# Patient Record
Sex: FEMALE | Race: WHITE | NOT HISPANIC OR LATINO | URBAN - METROPOLITAN AREA
[De-identification: names, ages, dates, MRNs, and addresses within clinical notes are randomized per-mention and may not be internally consistent; named-entity substitution may affect disease eponyms.]

---

## 2018-02-03 ENCOUNTER — EMERGENCY (EMERGENCY)
Facility: HOSPITAL | Age: 83
LOS: 1 days | End: 2018-02-03
Payer: MEDICARE

## 2018-02-03 PROCEDURE — 71046 X-RAY EXAM CHEST 2 VIEWS: CPT | Mod: 26

## 2018-02-03 PROCEDURE — 99284 EMERGENCY DEPT VISIT MOD MDM: CPT

## 2018-02-03 PROCEDURE — 74018 RADEX ABDOMEN 1 VIEW: CPT | Mod: 26

## 2019-02-08 ENCOUNTER — EMERGENCY (EMERGENCY)
Facility: HOSPITAL | Age: 84
LOS: 1 days | End: 2019-02-08
Admitting: EMERGENCY MEDICINE
Payer: MEDICARE

## 2019-02-08 PROCEDURE — 72131 CT LUMBAR SPINE W/O DYE: CPT | Mod: 26

## 2019-02-08 PROCEDURE — 99284 EMERGENCY DEPT VISIT MOD MDM: CPT

## 2019-02-08 PROCEDURE — 73030 X-RAY EXAM OF SHOULDER: CPT | Mod: 26,LT

## 2019-02-08 PROCEDURE — 73562 X-RAY EXAM OF KNEE 3: CPT | Mod: 26,LT

## 2019-02-08 PROCEDURE — 70450 CT HEAD/BRAIN W/O DYE: CPT | Mod: 26

## 2019-03-11 ENCOUNTER — OUTPATIENT (OUTPATIENT)
Dept: OUTPATIENT SERVICES | Facility: HOSPITAL | Age: 84
LOS: 1 days | End: 2019-03-11

## 2019-03-30 ENCOUNTER — OUTPATIENT (OUTPATIENT)
Dept: OUTPATIENT SERVICES | Facility: HOSPITAL | Age: 84
LOS: 1 days | End: 2019-03-30

## 2019-03-30 ENCOUNTER — INPATIENT (INPATIENT)
Facility: HOSPITAL | Age: 84
LOS: 1 days | Discharge: HOME | End: 2019-04-01
Attending: FAMILY MEDICINE
Payer: MEDICARE

## 2019-03-30 PROCEDURE — 70450 CT HEAD/BRAIN W/O DYE: CPT | Mod: 26

## 2019-03-30 PROCEDURE — 93880 EXTRACRANIAL BILAT STUDY: CPT | Mod: 26

## 2019-03-30 PROCEDURE — 71045 X-RAY EXAM CHEST 1 VIEW: CPT | Mod: 26

## 2019-03-30 PROCEDURE — 99285 EMERGENCY DEPT VISIT HI MDM: CPT

## 2019-03-30 PROCEDURE — 93010 ELECTROCARDIOGRAM REPORT: CPT

## 2019-03-31 ENCOUNTER — OUTPATIENT (OUTPATIENT)
Dept: OUTPATIENT SERVICES | Facility: HOSPITAL | Age: 84
LOS: 1 days | End: 2019-03-31

## 2019-04-01 ENCOUNTER — OUTPATIENT (OUTPATIENT)
Dept: OUTPATIENT SERVICES | Facility: HOSPITAL | Age: 84
LOS: 1 days | End: 2019-04-01

## 2019-04-08 ENCOUNTER — OUTPATIENT (OUTPATIENT)
Dept: OUTPATIENT SERVICES | Facility: HOSPITAL | Age: 84
LOS: 1 days | End: 2019-04-08

## 2019-07-17 ENCOUNTER — OUTPATIENT (OUTPATIENT)
Dept: OUTPATIENT SERVICES | Facility: HOSPITAL | Age: 84
LOS: 1 days | End: 2019-07-17
Payer: MEDICARE

## 2019-07-17 PROCEDURE — 70553 MRI BRAIN STEM W/O & W/DYE: CPT | Mod: 26

## 2019-08-26 ENCOUNTER — APPOINTMENT (OUTPATIENT)
Dept: MAMMOGRAPHY | Facility: CLINIC | Age: 84
End: 2019-08-26
Payer: MEDICARE

## 2019-08-26 PROCEDURE — 77067 SCR MAMMO BI INCL CAD: CPT

## 2019-08-26 PROCEDURE — 77063 BREAST TOMOSYNTHESIS BI: CPT

## 2020-02-29 ENCOUNTER — EMERGENCY (EMERGENCY)
Facility: HOSPITAL | Age: 85
LOS: 1 days | End: 2020-02-29
Admitting: EMERGENCY MEDICINE
Payer: MEDICARE

## 2020-02-29 PROCEDURE — 73630 X-RAY EXAM OF FOOT: CPT | Mod: 26,RT

## 2020-02-29 PROCEDURE — 99283 EMERGENCY DEPT VISIT LOW MDM: CPT

## 2020-03-01 PROCEDURE — 99285 EMERGENCY DEPT VISIT HI MDM: CPT

## 2020-03-02 ENCOUNTER — OUTPATIENT (OUTPATIENT)
Dept: OUTPATIENT SERVICES | Facility: HOSPITAL | Age: 85
LOS: 1 days | End: 2020-03-02

## 2020-03-02 ENCOUNTER — INPATIENT (INPATIENT)
Facility: HOSPITAL | Age: 85
LOS: 0 days | Discharge: HOME HEALTH | End: 2020-03-02
Admitting: STUDENT IN AN ORGANIZED HEALTH CARE EDUCATION/TRAINING PROGRAM
Payer: MEDICARE

## 2020-03-06 ENCOUNTER — INPATIENT (INPATIENT)
Facility: HOSPITAL | Age: 85
LOS: 3 days | Discharge: HOME | End: 2020-03-10
Payer: MEDICARE

## 2020-03-06 PROCEDURE — 99285 EMERGENCY DEPT VISIT HI MDM: CPT

## 2020-03-07 ENCOUNTER — OUTPATIENT (OUTPATIENT)
Dept: OUTPATIENT SERVICES | Facility: HOSPITAL | Age: 85
LOS: 1 days | End: 2020-03-07

## 2020-03-08 ENCOUNTER — OUTPATIENT (OUTPATIENT)
Dept: OUTPATIENT SERVICES | Facility: HOSPITAL | Age: 85
LOS: 1 days | End: 2020-03-08

## 2020-03-09 ENCOUNTER — OUTPATIENT (OUTPATIENT)
Dept: OUTPATIENT SERVICES | Facility: HOSPITAL | Age: 85
LOS: 1 days | End: 2020-03-09

## 2020-03-09 PROCEDURE — 73718 MRI LOWER EXTREMITY W/O DYE: CPT | Mod: 26,RT

## 2020-03-09 PROCEDURE — 93970 EXTREMITY STUDY: CPT | Mod: 26

## 2020-03-10 ENCOUNTER — OUTPATIENT (OUTPATIENT)
Dept: OUTPATIENT SERVICES | Facility: HOSPITAL | Age: 85
LOS: 1 days | End: 2020-03-10

## 2020-06-18 PROBLEM — Z00.00 ENCOUNTER FOR PREVENTIVE HEALTH EXAMINATION: Status: ACTIVE | Noted: 2020-06-18

## 2021-06-28 ENCOUNTER — APPOINTMENT (OUTPATIENT)
Dept: ULTRASOUND IMAGING | Facility: CLINIC | Age: 86
End: 2021-06-28

## 2021-06-28 ENCOUNTER — APPOINTMENT (OUTPATIENT)
Dept: RADIOLOGY | Facility: CLINIC | Age: 86
End: 2021-06-28
Payer: MEDICARE

## 2021-06-28 PROCEDURE — 77080 DXA BONE DENSITY AXIAL: CPT

## 2021-06-28 PROCEDURE — 76536 US EXAM OF HEAD AND NECK: CPT

## 2021-10-18 ENCOUNTER — NON-APPOINTMENT (OUTPATIENT)
Age: 86
End: 2021-10-18

## 2021-10-18 ENCOUNTER — APPOINTMENT (OUTPATIENT)
Dept: OPHTHALMOLOGY | Facility: CLINIC | Age: 86
End: 2021-10-18
Payer: MEDICARE

## 2021-10-18 PROCEDURE — 92134 CPTRZ OPH DX IMG PST SGM RTA: CPT

## 2021-10-18 PROCEDURE — 92014 COMPRE OPH EXAM EST PT 1/>: CPT

## 2022-04-14 ENCOUNTER — APPOINTMENT (OUTPATIENT)
Dept: OPHTHALMOLOGY | Facility: CLINIC | Age: 87
End: 2022-04-14

## 2022-05-02 ENCOUNTER — NON-APPOINTMENT (OUTPATIENT)
Age: 87
End: 2022-05-02

## 2022-05-02 ENCOUNTER — APPOINTMENT (OUTPATIENT)
Dept: OPHTHALMOLOGY | Facility: CLINIC | Age: 87
End: 2022-05-02
Payer: MEDICARE

## 2022-05-02 PROCEDURE — 92014 COMPRE OPH EXAM EST PT 1/>: CPT

## 2022-05-02 PROCEDURE — 92250 FUNDUS PHOTOGRAPHY W/I&R: CPT

## 2022-05-25 ENCOUNTER — TRANSCRIPTION ENCOUNTER (OUTPATIENT)
Age: 87
End: 2022-05-25

## 2022-05-25 ENCOUNTER — INPATIENT (INPATIENT)
Facility: HOSPITAL | Age: 87
LOS: 12 days | Discharge: HOME | DRG: 85 | End: 2022-06-07
Attending: HOSPITALIST | Admitting: NEUROLOGICAL SURGERY
Payer: MEDICARE

## 2022-05-25 ENCOUNTER — EMERGENCY (EMERGENCY)
Facility: HOSPITAL | Age: 87
LOS: 1 days | Discharge: HOME | End: 2022-05-25
Admitting: EMERGENCY MEDICINE
Payer: MEDICARE

## 2022-05-25 VITALS
SYSTOLIC BLOOD PRESSURE: 134 MMHG | DIASTOLIC BLOOD PRESSURE: 74 MMHG | HEART RATE: 87 BPM | OXYGEN SATURATION: 99 % | RESPIRATION RATE: 18 BRPM

## 2022-05-25 DIAGNOSIS — S01.01XA LACERATION WITHOUT FOREIGN BODY OF SCALP, INITIAL ENCOUNTER: ICD-10-CM

## 2022-05-25 DIAGNOSIS — Z90.49 ACQUIRED ABSENCE OF OTHER SPECIFIED PARTS OF DIGESTIVE TRACT: Chronic | ICD-10-CM

## 2022-05-25 DIAGNOSIS — I61.9 NONTRAUMATIC INTRACEREBRAL HEMORRHAGE, UNSPECIFIED: ICD-10-CM

## 2022-05-25 DIAGNOSIS — E11.9 TYPE 2 DIABETES MELLITUS WITHOUT COMPLICATIONS: ICD-10-CM
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
## 2022-05-25 DIAGNOSIS — S06.309A UNSPECIFIED FOCAL TRAUMATIC BRAIN INJURY WITH LOSS OF CONSCIOUSNESS OF UNSPECIFIED DURATION, INITIAL ENCOUNTER: ICD-10-CM
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
## 2022-05-25 DIAGNOSIS — Z04.3 ENCOUNTER FOR EXAMINATION AND OBSERVATION FOLLOWING OTHER ACCIDENT: ICD-10-CM
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
## 2022-05-25 LAB
ALBUMIN SERPL ELPH-MCNC: 3.9 G/DL — SIGNIFICANT CHANGE UP (ref 3.3–5.2)
ALLERGY+IMMUNOLOGY DIAG STUDY NOTE: SIGNIFICANT CHANGE UP
ALP SERPL-CCNC: 125 U/L — HIGH (ref 40–120)
ALT FLD-CCNC: 44 U/L — HIGH
AMPHET UR-MCNC: NEGATIVE — SIGNIFICANT CHANGE UP
ANION GAP SERPL CALC-SCNC: 12 MMOL/L — SIGNIFICANT CHANGE UP (ref 5–17)
APPEARANCE UR: CLEAR — SIGNIFICANT CHANGE UP
APTT BLD: 27.6 SEC — SIGNIFICANT CHANGE UP (ref 27.5–35.5)
AST SERPL-CCNC: 45 U/L — HIGH
BARBITURATES UR SCN-MCNC: NEGATIVE — SIGNIFICANT CHANGE UP
BASOPHILS # BLD AUTO: 0.04 K/UL — SIGNIFICANT CHANGE UP (ref 0–0.2)
BASOPHILS NFR BLD AUTO: 0.4 % — SIGNIFICANT CHANGE UP (ref 0–2)
BENZODIAZ UR-MCNC: NEGATIVE — SIGNIFICANT CHANGE UP
BILIRUB SERPL-MCNC: 0.4 MG/DL — SIGNIFICANT CHANGE UP (ref 0.4–2)
BILIRUB UR-MCNC: NEGATIVE — SIGNIFICANT CHANGE UP
BLD GP AB SCN SERPL QL: SIGNIFICANT CHANGE UP
BLD GP AB SCN SERPL QL: SIGNIFICANT CHANGE UP
BUN SERPL-MCNC: 18.8 MG/DL — SIGNIFICANT CHANGE UP (ref 8–20)
CALCIUM SERPL-MCNC: 8.6 MG/DL — SIGNIFICANT CHANGE UP (ref 8.6–10.2)
CHLORIDE SERPL-SCNC: 98 MMOL/L — SIGNIFICANT CHANGE UP (ref 98–107)
CO2 SERPL-SCNC: 24 MMOL/L — SIGNIFICANT CHANGE UP (ref 22–29)
COCAINE METAB.OTHER UR-MCNC: NEGATIVE — SIGNIFICANT CHANGE UP
COLOR SPEC: YELLOW — SIGNIFICANT CHANGE UP
CREAT SERPL-MCNC: 0.55 MG/DL — SIGNIFICANT CHANGE UP (ref 0.5–1.3)
DAT IGG-SP REAG RBC-IMP: ABNORMAL
DIFF PNL FLD: ABNORMAL
DIR ANTIGLOB POLYSPECIFIC INTERPRETATION: ABNORMAL
EGFR: 84 ML/MIN/1.73M2 — SIGNIFICANT CHANGE UP
EOSINOPHIL # BLD AUTO: 0.07 K/UL — SIGNIFICANT CHANGE UP (ref 0–0.5)
EOSINOPHIL NFR BLD AUTO: 0.7 % — SIGNIFICANT CHANGE UP (ref 0–6)
EPI CELLS # UR: SIGNIFICANT CHANGE UP
ETHANOL SERPL-MCNC: <10 MG/DL — SIGNIFICANT CHANGE UP (ref 0–9)
GLUCOSE BLDC GLUCOMTR-MCNC: 280 MG/DL — HIGH (ref 70–99)
GLUCOSE SERPL-MCNC: 115 MG/DL — HIGH (ref 70–99)
GLUCOSE UR QL: NEGATIVE MG/DL — SIGNIFICANT CHANGE UP
HCT VFR BLD CALC: 30.1 % — LOW (ref 34.5–45)
HGB BLD-MCNC: 9.7 G/DL — LOW (ref 11.5–15.5)
IAT COMP-SP REAG SERPL QL: ABNORMAL
IMM GRANULOCYTES NFR BLD AUTO: 0.5 % — SIGNIFICANT CHANGE UP (ref 0–1.5)
INR BLD: 1.12 RATIO — SIGNIFICANT CHANGE UP (ref 0.88–1.16)
KETONES UR-MCNC: ABNORMAL
LEUKOCYTE ESTERASE UR-ACNC: NEGATIVE — SIGNIFICANT CHANGE UP
LYMPHOCYTES # BLD AUTO: 0.5 K/UL — LOW (ref 1–3.3)
LYMPHOCYTES # BLD AUTO: 5.1 % — LOW (ref 13–44)
MCHC RBC-ENTMCNC: 29.4 PG — SIGNIFICANT CHANGE UP (ref 27–34)
MCHC RBC-ENTMCNC: 32.2 GM/DL — SIGNIFICANT CHANGE UP (ref 32–36)
MCV RBC AUTO: 91.2 FL — SIGNIFICANT CHANGE UP (ref 80–100)
METHADONE UR-MCNC: NEGATIVE — SIGNIFICANT CHANGE UP
MONOCYTES # BLD AUTO: 0.65 K/UL — SIGNIFICANT CHANGE UP (ref 0–0.9)
MONOCYTES NFR BLD AUTO: 6.6 % — SIGNIFICANT CHANGE UP (ref 2–14)
NEUTROPHILS # BLD AUTO: 8.53 K/UL — HIGH (ref 1.8–7.4)
NEUTROPHILS NFR BLD AUTO: 86.7 % — HIGH (ref 43–77)
NITRITE UR-MCNC: NEGATIVE — SIGNIFICANT CHANGE UP
OPIATES UR-MCNC: NEGATIVE — SIGNIFICANT CHANGE UP
PCP SPEC-MCNC: SIGNIFICANT CHANGE UP
PCP UR-MCNC: NEGATIVE — SIGNIFICANT CHANGE UP
PH UR: 7 — SIGNIFICANT CHANGE UP (ref 5–8)
PLATELET # BLD AUTO: 173 K/UL — SIGNIFICANT CHANGE UP (ref 150–400)
POTASSIUM SERPL-MCNC: 4.5 MMOL/L — SIGNIFICANT CHANGE UP (ref 3.5–5.3)
POTASSIUM SERPL-SCNC: 4.5 MMOL/L — SIGNIFICANT CHANGE UP (ref 3.5–5.3)
PROT SERPL-MCNC: 6.8 G/DL — SIGNIFICANT CHANGE UP (ref 6.6–8.7)
PROT UR-MCNC: NEGATIVE — SIGNIFICANT CHANGE UP
PROTHROM AB SERPL-ACNC: 13 SEC — SIGNIFICANT CHANGE UP (ref 10.5–13.4)
RBC # BLD: 3.3 M/UL — LOW (ref 3.8–5.2)
RBC # FLD: 14.8 % — HIGH (ref 10.3–14.5)
RBC CASTS # UR COMP ASSIST: SIGNIFICANT CHANGE UP /HPF (ref 0–4)
SARS-COV-2 RNA SPEC QL NAA+PROBE: SIGNIFICANT CHANGE UP
SODIUM SERPL-SCNC: 134 MMOL/L — LOW (ref 135–145)
SP GR SPEC: 1.01 — SIGNIFICANT CHANGE UP (ref 1.01–1.02)
THC UR QL: NEGATIVE — SIGNIFICANT CHANGE UP
UROBILINOGEN FLD QL: NEGATIVE MG/DL — SIGNIFICANT CHANGE UP
WBC # BLD: 9.84 K/UL — SIGNIFICANT CHANGE UP (ref 3.8–10.5)
WBC # FLD AUTO: 9.84 K/UL — SIGNIFICANT CHANGE UP (ref 3.8–10.5)
WBC UR QL: NEGATIVE /HPF — SIGNIFICANT CHANGE UP (ref 0–5)

## 2022-05-25 PROCEDURE — 99222 1ST HOSP IP/OBS MODERATE 55: CPT | Mod: GC

## 2022-05-25 PROCEDURE — 12001 RPR S/N/AX/GEN/TRNK 2.5CM/<: CPT

## 2022-05-25 PROCEDURE — 93306 TTE W/DOPPLER COMPLETE: CPT | Mod: 26

## 2022-05-25 PROCEDURE — 93010 ELECTROCARDIOGRAM REPORT: CPT

## 2022-05-25 PROCEDURE — 70450 CT HEAD/BRAIN W/O DYE: CPT | Mod: 26,MA,77

## 2022-05-25 PROCEDURE — 72170 X-RAY EXAM OF PELVIS: CPT | Mod: 26

## 2022-05-25 PROCEDURE — 72125 CT NECK SPINE W/O DYE: CPT | Mod: 26,MA

## 2022-05-25 PROCEDURE — 99222 1ST HOSP IP/OBS MODERATE 55: CPT

## 2022-05-25 PROCEDURE — 70450 CT HEAD/BRAIN W/O DYE: CPT | Mod: 26

## 2022-05-25 PROCEDURE — 71045 X-RAY EXAM CHEST 1 VIEW: CPT | Mod: 26

## 2022-05-25 PROCEDURE — 99291 CRITICAL CARE FIRST HOUR: CPT

## 2022-05-25 PROCEDURE — 70450 CT HEAD/BRAIN W/O DYE: CPT | Mod: 26,77

## 2022-05-25 PROCEDURE — 99291 CRITICAL CARE FIRST HOUR: CPT | Mod: FT,25

## 2022-05-25 RX ORDER — SODIUM CHLORIDE 9 MG/ML
1000 INJECTION, SOLUTION INTRAVENOUS
Refills: 0 | Status: DISCONTINUED | OUTPATIENT
Start: 2022-05-25 | End: 2022-05-26

## 2022-05-25 RX ORDER — INSULIN LISPRO 100/ML
VIAL (ML) SUBCUTANEOUS EVERY 6 HOURS
Refills: 0 | Status: DISCONTINUED | OUTPATIENT
Start: 2022-05-25 | End: 2022-05-26

## 2022-05-25 RX ORDER — DEXTROSE 50 % IN WATER 50 %
25 SYRINGE (ML) INTRAVENOUS ONCE
Refills: 0 | Status: DISCONTINUED | OUTPATIENT
Start: 2022-05-25 | End: 2022-05-26

## 2022-05-25 RX ORDER — OXYCODONE HYDROCHLORIDE 5 MG/1
5 TABLET ORAL ONCE
Refills: 0 | Status: DISCONTINUED | OUTPATIENT
Start: 2022-05-25 | End: 2022-05-25

## 2022-05-25 RX ORDER — LEVETIRACETAM 250 MG/1
500 TABLET, FILM COATED ORAL EVERY 12 HOURS
Refills: 0 | Status: DISCONTINUED | OUTPATIENT
Start: 2022-05-25 | End: 2022-05-26

## 2022-05-25 RX ORDER — OXYCODONE HYDROCHLORIDE 5 MG/1
5 TABLET ORAL EVERY 6 HOURS
Refills: 0 | Status: DISCONTINUED | OUTPATIENT
Start: 2022-05-25 | End: 2022-05-29

## 2022-05-25 RX ORDER — LABETALOL HCL 100 MG
10 TABLET ORAL
Refills: 0 | Status: DISCONTINUED | OUTPATIENT
Start: 2022-05-25 | End: 2022-06-05

## 2022-05-25 RX ORDER — GLUCAGON INJECTION, SOLUTION 0.5 MG/.1ML
1 INJECTION, SOLUTION SUBCUTANEOUS ONCE
Refills: 0 | Status: DISCONTINUED | OUTPATIENT
Start: 2022-05-25 | End: 2022-05-26

## 2022-05-25 RX ORDER — ONDANSETRON 8 MG/1
4 TABLET, FILM COATED ORAL EVERY 6 HOURS
Refills: 0 | Status: DISCONTINUED | OUTPATIENT
Start: 2022-05-25 | End: 2022-06-07

## 2022-05-25 RX ORDER — ACETAMINOPHEN 500 MG
650 TABLET ORAL EVERY 6 HOURS
Refills: 0 | Status: DISCONTINUED | OUTPATIENT
Start: 2022-05-25 | End: 2022-05-26

## 2022-05-25 RX ORDER — HYDRALAZINE HCL 50 MG
10 TABLET ORAL
Refills: 0 | Status: DISCONTINUED | OUTPATIENT
Start: 2022-05-25 | End: 2022-06-05

## 2022-05-25 RX ORDER — ACETAMINOPHEN 500 MG
1000 TABLET ORAL ONCE
Refills: 0 | Status: COMPLETED | OUTPATIENT
Start: 2022-05-25 | End: 2022-05-25

## 2022-05-25 RX ORDER — ACETAMINOPHEN 500 MG
650 TABLET ORAL ONCE
Refills: 0 | Status: COMPLETED | OUTPATIENT
Start: 2022-05-25 | End: 2022-05-25

## 2022-05-25 RX ORDER — SENNA PLUS 8.6 MG/1
2 TABLET ORAL AT BEDTIME
Refills: 0 | Status: DISCONTINUED | OUTPATIENT
Start: 2022-05-25 | End: 2022-06-07

## 2022-05-25 RX ORDER — CHLORHEXIDINE GLUCONATE 213 G/1000ML
1 SOLUTION TOPICAL DAILY
Refills: 0 | Status: DISCONTINUED | OUTPATIENT
Start: 2022-05-25 | End: 2022-06-07

## 2022-05-25 RX ORDER — DEXTROSE 50 % IN WATER 50 %
15 SYRINGE (ML) INTRAVENOUS ONCE
Refills: 0 | Status: DISCONTINUED | OUTPATIENT
Start: 2022-05-25 | End: 2022-05-26

## 2022-05-25 RX ORDER — POLYETHYLENE GLYCOL 3350 17 G/17G
17 POWDER, FOR SOLUTION ORAL DAILY
Refills: 0 | Status: DISCONTINUED | OUTPATIENT
Start: 2022-05-25 | End: 2022-06-07

## 2022-05-25 RX ADMIN — OXYCODONE HYDROCHLORIDE 5 MILLIGRAM(S): 5 TABLET ORAL at 19:10

## 2022-05-25 RX ADMIN — SODIUM CHLORIDE 50 MILLILITER(S): 9 INJECTION, SOLUTION INTRAVENOUS at 18:10

## 2022-05-25 RX ADMIN — SODIUM CHLORIDE 50 MILLILITER(S): 9 INJECTION, SOLUTION INTRAVENOUS at 21:13

## 2022-05-25 RX ADMIN — OXYCODONE HYDROCHLORIDE 5 MILLIGRAM(S): 5 TABLET ORAL at 22:00

## 2022-05-25 RX ADMIN — Medication 400 MILLIGRAM(S): at 15:49

## 2022-05-25 RX ADMIN — LEVETIRACETAM 420 MILLIGRAM(S): 250 TABLET, FILM COATED ORAL at 19:05

## 2022-05-25 RX ADMIN — Medication 650 MILLIGRAM(S): at 11:57

## 2022-05-25 RX ADMIN — Medication 1000 MILLIGRAM(S): at 16:04

## 2022-05-25 RX ADMIN — OXYCODONE HYDROCHLORIDE 5 MILLIGRAM(S): 5 TABLET ORAL at 18:40

## 2022-05-25 RX ADMIN — OXYCODONE HYDROCHLORIDE 5 MILLIGRAM(S): 5 TABLET ORAL at 21:12

## 2022-05-25 RX ADMIN — Medication 6: at 18:40

## 2022-05-25 RX ADMIN — Medication 650 MILLIGRAM(S): at 15:46

## 2022-05-25 NOTE — CONSULT NOTE ADULT - SUBJECTIVE AND OBJECTIVE BOX
HPI: Patient is a 89 y/o female transfer from [facility] [place] s/p trip and fall. Imaging studies at [place] revealed right intraparenchymal hemorrhage prompting transfer to [facility] for neurosurgical evaluation & management. On arrival patient with no acute complaints. States she tripped and fell, no loss of consciousness, reports she takes a baby aspirin daily. No other reported anticoagulation / antiplatelet use. Airway: intact, c-collar in place on arrival. Breathing: breath sounds CTA b/l, no accessory muscle use, no conversational dyspnea. Circulation: 2+ central & peripheral pulses b/l. Disability: pupils 2mm round and reactive to light b/l, GCS15. Exposure: fully exposed and covered w/ warm blankets. CXR & pelvis XR performed without obvious acute traumatic findings. PIV placed by RN and labs drawn. istat pH 7.45, base excess +5, Hgb 9.9. (Hgb 10.3 on blood work from [facility] [place]).    Imaging studies from [facility] reviewed with Dr. Conley. CT head showed R intraparenchymal hemorrhage. CT cervical spine without acute findings, degenerative changes noted. Neurosurgical consult placed @ 1045. After physical exam and pt without neuro deficits / distracting injuries, cervical collar cleared by resident MD Taylor.  (25 May 2022 10:47)      PAST MEDICAL & SURGICAL HISTORY:  Diabetes mellitus  Hyperlipidemia  Hypothyroidism  S/P cholecystectomy    Interval History:  24 Hour Events: Increased Volume IPH since initial CTH; no focal deficits     1. Patient's Neurologic Exam unchanged.    2. Patient's Hemodynamic's maintained without drips.    3. Patient's Respiratory status is: adequate on RA    4. Patient is pending: Patient is pending further observation and radiographic studies to assess status of IPH as well as medical workup for optimization     5. Dispo: ICU for now, downgrade when clinically stable.       Physical Exam:  Constitutional: NAD resting comfortably in ED stretcher     Neuro  * Mental Status:  GCS 15:  E(4), V(5), M(6).  Awake, alert, oriented to conversation, following all commands, conversing appropriately   * Cranial Nerves: Cnii-Cnxii grossly intact. PERRL, EOMI, tongue midline, no gaze deviation  * Motor: RUE 5/5, LUE 5/5, RLE 5/5, LLE 5/5  * Sensory: Sensation intact to light touch  * Reflexes: Not assessed  * Gait: Not assessed  * No focal deficits noted on clinical examination     Cardiovascular:  S1, S2 no murmurs appreciated.  Regular rate and rhythm.  Eyes: See neurologic examination with detailed examination of eyes.  ENT: No JVD, Trachea Midline.  Respiratory: Clear to auscultation in bilateral lung fields   Gastrointestinal: Soft, nontender, mild distension of abdomen   Genitourinary:  [ x ] No Sahni.   Musculoskeletal: No muscle wasting noted, (See neuorlogic assessment for full muscle strength assessment) No pretibial edema appreciated, no appreciable calf tenderness.  Skin: Minor laceration on rear occiput closed with staples         Vital Signs Last 24 Hrs  T(C): 36.7 (25 May 2022 16:20), Max: 36.8 (25 May 2022 13:00)  T(F): 98 (25 May 2022 16:20), Max: 98.3 (25 May 2022 13:00)  HR: 78 (25 May 2022 16:20) (72 - 87)  BP: 134/74 (25 May 2022 16:20) (128/65 - 136/74)  RR: 18 (25 May 2022 16:20) (16 - 18)  SpO2: 99% (25 May 2022 16:20) (98% - 99%)    Labs                        9.7    9.84  )-----------( 173      ( 25 May 2022 11:16 )             30.1       05-25    134<L>  |  98  |  18.8  ----------------------------<  115<H>  4.5   |  24.0  |  0.55    Ca    8.6      25 May 2022 11:16    TPro  6.8  /  Alb  3.9  /  TBili  0.4  /  DBili  x   /  AST  45<H>  /  ALT  44<H>  /  AlkPhos  125<H>  05-25      LIVER FUNCTIONS - ( 25 May 2022 11:16 )  Alb: 3.9 g/dL / Pro: 6.8 g/dL / ALK PHOS: 125 U/L / ALT: 44 U/L / AST: 45 U/L / GGT: x             PT/INR - ( 25 May 2022 11:16 )   PT: 13.0 sec;   INR: 1.12 ratio    PTT - ( 25 May 2022 11:16 )  PTT:27.6 sec      Neurosurgery Imaging: I attest that all recent neurosurgical imaging was personally reviewed  < from: CT Head No Cont (05.25.22 @ 15:21) >  ACC: [id number] EXAM:  CT BRAIN                          PROCEDURE DATE:  05/25/2022          INTERPRETATION:  Noncontrast CT of the brain.    CLINICAL INDICATION:  Follow-up right parietal parenchymal hemorrhage    TECHNIQUE : Axial CT scanning of the brain was obtained from the skull   base to the vertex without the administration of intravenous contrast.   Sagittal and coronal reformats were provided.    COMPARISON: CT brain 5/25/2022 obtained at 8:50 AM    FINDINGS:    Increased size of acuteright mesial parietal parenchymal hemorrhage,   currently 2.1 x 2.2 cm, previously 1.4 x 1.5 cm. Increased surrounding   vasogenic edema and local mass effect.    No hydrocephalus, midline shift, or effacement of basal cisterns.    No acute extra-axial hemorrhage.    Mild white matter microvascular ischemic disease.    No displaced calvarial fracture.    IMPRESSION:    Increased size of acute right mesial parietal parenchymal hemorrhage,   currently 2.1 x 2.2 cm, previously 1.4 x 1.5 cm. Increased surrounding   vasogenic edema and local mass effect.    Dr. Oscar discussed these findings with Dr. Gene Ang on 5/25/2022   3:35 PM with read back.    --- End of Report ---  RACHAEL OSCAR MD; Attending Radiologist  This document has beenelectronically signed. May 25 2022  3:42PM    < end of copied text >    Medications:  MEDICATIONS  (STANDING):  dextrose 5%. 1000 milliLiter(s) (50 mL/Hr) IV Continuous <Continuous>  dextrose 50% Injectable 25 Gram(s) IV Push once  glucagon  Injectable 1 milliGRAM(s) IntraMuscular once  insulin lispro (ADMELOG) corrective regimen sliding scale   SubCutaneous every 6 hours  lactated ringers. 1000 milliLiter(s) (50 mL/Hr) IV Continuous <Continuous>  levETIRAcetam  IVPB 500 milliGRAM(s) IV Intermittent every 12 hours    MEDICATIONS  (PRN):  dextrose Oral Gel 15 Gram(s) Oral once PRN Blood Glucose LESS THAN 70 milliGRAM(s)/deciliter  hydrALAZINE Injectable 10 milliGRAM(s) IV Push every 2 hours PRN SBP > 140  labetalol Injectable 10 milliGRAM(s) IV Push every 2 hours PRN Systolic blood pressure > 140  ondansetron Injectable 4 milliGRAM(s) IV Push every 6 hours PRN Nausea and/or Vomiting               HPI: Patient is a 89 y/o female transfer from [facility] [place] s/p trip and fall. Imaging studies at [place] revealed right intraparenchymal hemorrhage prompting transfer to [facility] for neurosurgical evaluation & management. On arrival patient with no acute complaints. States she tripped and fell, no loss of consciousness, reports she takes a baby aspirin daily. No other reported anticoagulation / antiplatelet use. Airway: intact, c-collar in place on arrival. Breathing: breath sounds CTA b/l, no accessory muscle use, no conversational dyspnea. Circulation: 2+ central & peripheral pulses b/l. Disability: pupils 2mm round and reactive to light b/l, GCS15. Exposure: fully exposed and covered w/ warm blankets. CXR & pelvis XR performed without obvious acute traumatic findings. PIV placed by RN and labs drawn. istat pH 7.45, base excess +5, Hgb 9.9. (Hgb 10.3 on blood work from [facility] [place]).    Imaging studies from [facility] reviewed with Dr. Conley. CT head showed R intraparenchymal hemorrhage. CT cervical spine without acute findings, degenerative changes noted. Neurosurgical consult placed @ 1045. After physical exam and pt without neuro deficits / distracting injuries, cervical collar cleared by resident MD Taylor.  (25 May 2022 10:47)      PAST MEDICAL & SURGICAL HISTORY:  Diabetes mellitus  Hyperlipidemia  Hypothyroidism  S/P cholecystectomy    Interval History:  24 Hour Events: Increased Volume IPH since initial CTH; no focal deficits     1. Patient's Neurologic Exam unchanged.    2. Patient's Hemodynamic's maintained without drips.    3. Patient's Respiratory status is: adequate on RA    4. Patient is pending: Patient is pending further observation and radiographic studies to assess status of IPH as well as medical workup for optimization     5. Dispo: ICU for now, downgrade when clinically stable.       Physical Exam:  Constitutional: NAD resting comfortably in ED stretcher     Neuro  * Mental Status:  GCS 15:  E(4), V(5), M(6).  Awake, alert, oriented to conversation, following all commands, conversing appropriately   * Cranial Nerves: Cnii-Cnxii grossly intact. PERRL, EOMI, tongue midline, no gaze deviation  * Motor: RUE 5/5, LUE 5/5, RLE 5/5, LLE 5/5  * Sensory: Sensation intact to light touch  * Reflexes: Not assessed  * Gait: Not assessed  * No focal deficits noted on clinical examination     Cardiovascular:  S1, S2 no murmurs appreciated.  Regular rate and rhythm.  Eyes: See neurologic examination with detailed examination of eyes.  ENT: No JVD, Trachea Midline.  Respiratory: Clear to auscultation in bilateral lung fields   Gastrointestinal: Soft, nontender, mild distension of abdomen   Genitourinary:  [ x ] No Sahni.   Musculoskeletal: No muscle wasting noted, (See neuorlogic assessment for full muscle strength assessment) No pretibial edema appreciated, no appreciable calf tenderness.  Skin: Minor laceration on rear occiput closed with staples         Vital Signs Last 24 Hrs  T(C): 36.7 (25 May 2022 16:20), Max: 36.8 (25 May 2022 13:00)  T(F): 98 (25 May 2022 16:20), Max: 98.3 (25 May 2022 13:00)  HR: 78 (25 May 2022 16:20) (72 - 87)  BP: 134/74 (25 May 2022 16:20) (128/65 - 136/74)  RR: 18 (25 May 2022 16:20) (16 - 18)  SpO2: 99% (25 May 2022 16:20) (98% - 99%)    Labs                        9.7    9.84  )-----------( 173      ( 25 May 2022 11:16 )             30.1       05-25    134<L>  |  98  |  18.8  ----------------------------<  115<H>  4.5   |  24.0  |  0.55    Ca    8.6      25 May 2022 11:16    TPro  6.8  /  Alb  3.9  /  TBili  0.4  /  DBili  x   /  AST  45<H>  /  ALT  44<H>  /  AlkPhos  125<H>  05-25      LIVER FUNCTIONS - ( 25 May 2022 11:16 )  Alb: 3.9 g/dL / Pro: 6.8 g/dL / ALK PHOS: 125 U/L / ALT: 44 U/L / AST: 45 U/L / GGT: x             PT/INR - ( 25 May 2022 11:16 )   PT: 13.0 sec;   INR: 1.12 ratio    PTT - ( 25 May 2022 11:16 )  PTT:27.6 sec      Neurosurgery Imaging: I attest that all recent neurosurgical imaging was personally reviewed  CT Head No Cont (05.25.22 @ 15:21) >  ACC: [id number] EXAM:  CT BRAIN                          PROCEDURE DATE:  05/25/2022          INTERPRETATION:  Noncontrast CT of the brain.    CLINICAL INDICATION:  Follow-up right parietal parenchymal hemorrhage    TECHNIQUE : Axial CT scanning of the brain was obtained from the skull   base to the vertex without the administration of intravenous contrast.   Sagittal and coronal reformats were provided.    COMPARISON: CT brain 5/25/2022 obtained at 8:50 AM    FINDINGS:    Increased size of acuteright mesial parietal parenchymal hemorrhage,   currently 2.1 x 2.2 cm, previously 1.4 x 1.5 cm. Increased surrounding   vasogenic edema and local mass effect.    No hydrocephalus, midline shift, or effacement of basal cisterns.    No acute extra-axial hemorrhage.    Mild white matter microvascular ischemic disease.    No displaced calvarial fracture.    IMPRESSION:    Increased size of acute right mesial parietal parenchymal hemorrhage,   currently 2.1 x 2.2 cm, previously 1.4 x 1.5 cm. Increased surrounding   vasogenic edema and local mass effect.    Dr. Oscar discussed these findings with Dr. Gene Ang on 5/25/2022   3:35 PM with read back.    --- End of Report ---  RACHAEL OSCAR MD; Attending Radiologist  This document has beenelectronically signed. May 25 2022  3:42PM    < end of copied text >    Medications:  MEDICATIONS  (STANDING):  dextrose 5%. 1000 milliLiter(s) (50 mL/Hr) IV Continuous <Continuous>  dextrose 50% Injectable 25 Gram(s) IV Push once  glucagon  Injectable 1 milliGRAM(s) IntraMuscular once  insulin lispro (ADMELOG) corrective regimen sliding scale   SubCutaneous every 6 hours  lactated ringers. 1000 milliLiter(s) (50 mL/Hr) IV Continuous <Continuous>  levETIRAcetam  IVPB 500 milliGRAM(s) IV Intermittent every 12 hours    MEDICATIONS  (PRN):  dextrose Oral Gel 15 Gram(s) Oral once PRN Blood Glucose LESS THAN 70 milliGRAM(s)/deciliter  hydrALAZINE Injectable 10 milliGRAM(s) IV Push every 2 hours PRN SBP > 140  labetalol Injectable 10 milliGRAM(s) IV Push every 2 hours PRN Systolic blood pressure > 140  ondansetron Injectable 4 milliGRAM(s) IV Push every 6 hours PRN Nausea and/or Vomiting

## 2022-05-25 NOTE — ED ADULT NURSE NOTE - NSIMPLEMENTINTERV_GEN_ALL_ED
Implemented All Fall with Harm Risk Interventions:  [place] to call system. Call bell, personal items and telephone within reach. Instruct patient to call for assistance. Room bathroom lighting operational. Non-slip footwear when patient is off stretcher. Physically safe environment: no spills, clutter or unnecessary equipment. Stretcher in lowest position, wheels locked, appropriate side rails in place. Provide visual cue, wrist band, yellow gown, etc. Monitor gait and stability. Monitor for mental status changes and reorient to person, place, and time. Review medications for side effects contributing to fall risk. Reinforce activity limits and safety measures with patient and family. Provide visual clues: red socks.

## 2022-05-25 NOTE — CONSULT NOTE ADULT - CRITICAL CARE ATTENDING COMMENT
89F s/p mechanical fall presenting as a transfer from [facility] [place] with a R. Parietal IPH which has worsened in size since initial CTH. Patient is neurologically intact with only complaint being headache.   Post bleed day 0.     Plan   CT in am  - maintain SBP < 140 to prevent increased hemorrhage  - Keppra 500 BID for seizure prophylaxis for 7-10d   - MRI of brain - ? hemorrhagic met by configuration; not likely cavernoma; quite circumferential to consider amyloid  - PLT > 100K and Nl coags   - Maintain SBP - 100- < 140  - Cardiac ECHO  - Stroke Core measures  - Monitor Na - if Na < 134 will start Na Cl tabs 2 grams q 8  - Consider CT C/A/P - with contrast if MRI suspicious of met.

## 2022-05-25 NOTE — DISCHARGE NOTE NURSING/CASE MANAGEMENT/SOCIAL WORK - NSDCPEFALRISK_GEN_ALL_CORE
For information on Fall & Injury Prevention, visit: [email or web address] OR  [email or web address] OR  https://www.cdc.gov/steadi/patient.html

## 2022-05-25 NOTE — H&P ADULT - ASSESSMENT
89 [place]/o [place] [facility] [place] sustaining right intraparenchymal hemorrhage. On ASA 81mg QD - no other antiplatelet / anticoagulation. Primary survey intact. Secondary survey notable for laceration to occiput which was repaired w/ staples prior to arrival - wound well approximated w/ good hemostasis. Pt without any other additional injuries identified on physical exam.  - CXR & pelvis XR without obvious acute traumatic findings  - Neurosx consult called for [facility] @ [phone]  - Patient does not require any further work-up from a trauma perspective - she is cleared to be admitted to neurosx/neuroICU for further management if intraparenchymal hemorrhage  - Pt seen & examined with Dr. Conley  89 y/o female transfer from [facility] [place]/p trip and fall sustaining right intraparenchymal hemorrhage. On ASA 81mg QD - no other antiplatelet / anticoagulation. Primary survey intact. Secondary survey notable for laceration to occiput which was repaired w/ staples prior to arrival - wound well approximated w/ good hemostasis. Pt without any other additional injuries identified on physical exam.  - CXR & pelvis XR without obvious acute traumatic findings  - Neurosx consult called for [facility] @ [phone]  - Patient does not require any further work-up from a trauma perspective - she is cleared to be admitted to neurosx/neuroICU for further management of intraparenchymal hemorrhage  - Pt seen & examined with Dr. Conley

## 2022-05-25 NOTE — PATIENT PROFILE ADULT - FALL HARM RISK - HARM RISK INTERVENTIONS

## 2022-05-25 NOTE — H&P ADULT - NSHPPHYSICALEXAM_GEN_ALL_CORE
Constitutional: elderly female presenting in no acute distress, calm & cooperative w/ exam  HEENT: + 4cm LACERATION to occiput repaired w/ 7 staples prior to arrival - good hemostasis. Maxillofacial structures stable, no blood or discharge from nares or oral cavity, no mendoza sign / raccoon eyes, EOMI b/l, pupils 2mm round and reactive to light b/l, no active drainage or redness  Neck: cervical collar in place, trachea midline  Respiratory: breath sounds CTA b/l respirations are unlabored, no accessory muscle use, no conversational dyspnea  Cardiovascular: regular rate & rhythm, +S1, S2  Chest: chest wall is non-tender to palpation, no subQ emphysema or crepitus palpated  Gastrointestinal: abdomen soft, non-tender, non-distended, no rebound tenderness / guarding, no ecchymosis or external signs of abdominal trauma  Extremities: moving all extremities spontaneously, no point tenderness or deformity noted to upper or lower extremities b/l  Pelvis: stable  Vascular: 2+ radial, femoral, and DP pulses b/l  Neurological: GCS: 15 (4/5/6). A&O x 3; no gross sensory / motor / coordination deficits  Musculoskeletal: 5/5 strength of upper and lower extremities b/l  Back: no C/T/LS spine tenderness to palpation, no step-offs or signs of external trauma to the back

## 2022-05-25 NOTE — H&P ADULT - NS ATTEND AMEND GEN_ALL_CORE FT
Pt seen and examined by me  alert, cooperative  No LOC  4cm lac post occiput-closed with staples at OSH  IPH on head CT  NSG to admit

## 2022-05-25 NOTE — ED ADULT NURSE REASSESSMENT NOTE - NS ED NURSE REASSESS COMMENT FT1
pt sitting calm in bed. a and o x3. breathing even and unlabored. nsr on cm. neuro check performed. awaiting bed placement in neuro icu. will continue to monitor.

## 2022-05-25 NOTE — GOALS OF CARE CONVERSATION - ADVANCED CARE PLANNING - CONVERSATION DETAILS
Discussed advance directives with patient - states she does not have any advance directives in place at this time. I explained what it means to be "DNR/DNI." Patient does NOT want to be DNR/DNI. Patient wants to be FULL CODE. States she has 2 sons and a grandson who would make decisions for her in the event that she is unable to make decisions for herself, but has not chosen one to be a designated health care proxy. I encouraged patient to discuss this with her family.

## 2022-05-25 NOTE — H&P ADULT - NSHPPOADEEPVENOUSTHROMB_GEN_A_CORE
0720 Assumed care of pt at this time. Pt in chair with no signs of distress. Pt left with call light within reach and encouraged to call for assistance. 2986 Informed SANTOSH Luna about low Bp and pt is tachycardia, he asked to hold bp med for now, no other order given    0862 Head to toe assessment completed at this time  Patient is A&OX4. Pt denies N/V chest pain and SOB or distress. Pt is calm and cooperative. Pedal pulses are present. Capillary refill less than 3 seconds. Skin in warm and dry with Rosalio dressing on Lt hip and is CDI. Lungs are clear bilaterally. Patient instructed on use and reason for incentive spirometer. Bowel sounds are active. Abdomen is soft and non-tender. JANICE & SCDS in place bilaterally . Positive dorsalis pedis pulse, sensation, warm. No tingling or numbness to lower extremities. 22 g needle in the LFA. Pain scale explained to patient. Reasons for taking PRN meds explained to patient. Patient instructed to call for prn when needed. Pain level is 6, Medicated as per MAR. Patient was oriented to call bell and bed function.  Will continue to monitor no

## 2022-05-25 NOTE — CONSULT NOTE ADULT - NS ATTEND AMEND GEN_ALL_CORE FT
NSGY Attg:    see above    patient seen and examined    CT head reviewed -- repeat CT with increase in ICH    agree with exam and plan as above

## 2022-05-25 NOTE — CONSULT NOTE ADULT - ASSESSMENT
Assessment: 89F s/p mechanical fall [place] [facility] [place]. Parietal IPH which has worsened in size since initial CTH. Patient is neurologically intact with only complaint being headache. Post bleed day 0.     Plan  Neuro:  Intraparenchymal Hemorrhage   - q1h neurochecks  - admit to NSICU   - CTH repeat at 9pm to assess for status of hemorrhage   - HOB 30 degrees  - maintain SBP < 140 to prevent increased hemorrhage  - Keppra 500 BID for seizure prophylaxis for 7-10d   - Full code   - No neurosurgical intervention warranted at this time   - pain control as needed for headache   - Maintain normonatremia and trend sodium   - Hold any antiplatelet or anticoagulation medications until cleared by neurosurgery   - PT / OT when hemorrhage is stable   	  CV:  	SBP Goal<140   	Hydralazine and labetalol PRNs in place               EKG and TTE ordered               Lipid profile ordered     Pulm:  	  	Saturating well on RA               Continuous SpO2 monitoring     GI:  	Nutrition: NPO pending dysphagia               Mild transaminitis; will trend LFTs   	  	  Gu:  	Voiding              LR @ 60 for hydration   	I&O Q1 hour  	Monitor Electrolytes & Renal Function    Heme:  	Monitor H&H              Iron studies ordered for anemia workup   	Chemical DVT prophylaxis:   		* Chemical DVT prophylaxis is contraindicated due to risk of bleeding  	Mechanical DVT Prophylaxis: Maintain B/L LE sequential compression devices  	  ID:  	Monitor WBC and Temperature  	  		    Endo    A1C and TSH ordered              Continue home dose of synthroid   	Monitor BGL, maintain <180  	MAURIZIO   		100-150 no correction  		150-200  2units  		200-250  4units  		250-300	 6units  		300-350  8units  		350-400  10units  		400+	12units

## 2022-05-25 NOTE — ED PROVIDER NOTE - CARE PLAN
Principal Discharge DX:	ICH (intracerebral hemorrhage)  Secondary Diagnosis:	Occipital scalp laceration   1

## 2022-05-25 NOTE — DISCHARGE NOTE NURSING/CASE MANAGEMENT/SOCIAL WORK - PATIENT PORTAL LINK FT
You can access the FollowMyHealth Patient Portal offered by [facility] by registering at the following website: [email or web address]. By joining [employer]’s FollowMyHealth portal, you will also be able to view your health information using other applications (apps) compatible with our system.

## 2022-05-25 NOTE — CONSULT NOTE ADULT - ASSESSMENT
INcomplete INcompleteCt Ct  this is a 89 yf that fell and struck head while fixing her bed.  Pt is on 81 mg qd of asa.  She lives alone yet, has lots of family that are involved in her care.     Plan  1. Pt will need to be admitted with neuro and vital sign checks. q2min  2. pt will need a repeat ct scan of the brain at 3:30pm  3. Pt cleared by the trauma service   and exam and films will be reviewed with Dr Ang,  this is a 89 yf that fell and struck head while fixing her bed.  Pt is on 81 mg qd of asa.  She lives alone yet, has lots of family that are involved in her care.     Plan  1. Pt will need to be admitted with neuro and vital sign checks. q2min  2. pt will need a repeat ct scan of the brain at 3:30pm  3. Pt cleared by the trauma service   and exam and films will be reviewed with Dr Ang,   4. will review and consider if the patient will need a mri of the brain with and without moses  this is a 89 yf that fell and struck head while fixing her bed.  Pt is on 81 mg qd of asa.  She lives alone yet, has lots of family that are involved in her care.     Plan  1. Pt will need to be admitted with neuro and vital sign checks. q1 hrs  2. pt will need a repeat ct scan of the brain at 3:30pm  3. Pt cleared by the trauma service   and exam and films will be reviewed with Dr Ang,   4. will review and consider if the patient will need a mri of the brain with and without moses   5. Reviewed the scan with the patient's son - Greg Red [phone]

## 2022-05-25 NOTE — GOALS OF CARE CONVERSATION - ADVANCED CARE PLANNING - DOES PATIENT HAVE ADVANCE DIRECTIVE
No Physical Therapy  Daily Treatment Note  Date: 4/10/2019  Patient Name: Skye Hercules  MRN: [id number]     :   1951    Subjective:   General  Additional Pertinent Hx: 78 y/o M presents with R knee OA. PMH includes cervical sx, chronic back pain, neuropathy  Referring Practitioner: Arvin Soto DO  PT Visit Information  PT Insurance Information: Medicare (Primary) and [employer] (Secondary)  Total # of Visits Approved: (8-12)  Plan of Care/Certification Expiration Date: 19  Subjective  Subjective: Patient states that past couple of days knee pain has been worse over past two. Pain Screening  Patient Currently in Pain: Yes  Pain Assessment  Pain Assessment: 0-10  Pain Level: 3  Vital Signs  Patient Currently in Pain: Yes       Treatment Activities:                                      Exercises  Exercise 1: Quad sets 3\" x 30 bilaterally   Exercise 2: VMO quad sets 3\" x 30 bilaterally  Exercise 3:  right SAQ (yellow bolster) x 30  Exercise 4: SLR x 10   Exercise 5: Heel slides  x 20----- Bilat contract/relax HS stretch  3 x 10\" ea------ Bilat knee extension stretch on 1/2 foam wedge  x 3'  Exercise 6: R hip flexor stretch (Adryan test position) 3 x 30\"  Exercise 7: right LAQ x 20  Exercise 8: right HS Curls (green t-band) x 20  Exercise 9: Hip abd (green t-band) x 20 /ball squeeze x 20  Exercise 10: Sitting marches x 20 ea  Exercise 11: Mini squats x 10  Exercise 12: Standing hip abd/ext x 10 ea  Exercise 13: Heel raises x 10  Exercise 14: Standing marches x 10  Exercise 15: TKE  (green) x 15  Exercise 16: Progress to step ups if able to tolerate 6\" x 11  fwd right   Exercise 17: Box steps x 5 each direction  Exercise 18: Side-stepping x 2   Exercise 19: LBE  lvl 2 x 5'  Exercise 20: 3/22/19: HEP ISSUED                                   Assessment:   Conditions Requiring Skilled Therapeutic Intervention  Body structures, Functions, Activity limitations: Decreased functional mobility ; Decreased high-level IADLs;Decreased ROM; Decreased sensation;Decreased strength; Increased Pain  Assessment: Mr. Charly Rodriguez appeared to give good effort with his session today. He complained of appropriate discomfort with some standing exercises but otherwise tolerated his session well. Treatment Diagnosis: R knee pain  REQUIRES PT FOLLOW UP: Yes      G-Code:     OutComes Score                                                  Goals:  Short term goals  Time Frame for Short term goals: 3-4 weeks  Short term goal 1: Independent with HEP  Short term goal 2: Improve R knee extension to 0 degrees. Short term goal 3: Perform 10 Good quality quad sets. Short term goal 4: Improve bilat LE strength to 5/5. Long term goals  Time Frame for Long term goals : 4-6 weeks  Long term goal 1: Report less feeling of knee buckling and giving way. Long term goal 2: Report less pain with ambulation. Long term goal 3: Report improved ability to navigate stairs. Long term goal 4: Improve ADLS score to less than 50% impairment.   Patient Goals   Patient goals : reduce R knee pain    Plan:    Plan  Times per week: 2x  Plan weeks: 4-6 weeks  Current Treatment Recommendations: Strengthening, Gait Training, Patient/Caregiver Education & Training, ROM, Equipment Evaluation, Education, & procurement, Neuromuscular Re-education, Home Exercise Program, Safety Education & Training  Timed Code Treatment Minutes: 45 Minutes     Therapy Time   Individual Concurrent Group Co-treatment   Time In 1031         Time Out 1116         Minutes 45         Timed Code Treatment Minutes: [address], PT

## 2022-05-25 NOTE — H&P ADULT - NSHPLABSRESULTS_GEN_ALL_CORE
Imaging @ [facility]  CT HEAD 0850: 2.2 cm intraparenchymal hematoma in the RIGHT parietal convexity with minimal edema.  Mild soft tissue swelling and parietal scalp.\  CT CERVICAL SPINE:  No vertebral fracture is recognized.  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 There is narrowing of the LEFT C2-3, BILATERAL C3-4, RIGHT C4-5, BILATERAL C5-6 and C6-7 neural foramina due to uncovertebral and facet osteophytic hypertrophy. Degenerative cord impingement is seen at C5-6.

## 2022-05-25 NOTE — CONSULT NOTE ADULT - SUBJECTIVE AND OBJECTIVE BOX
HPI:  Patient is a 89 y/o female transfer from [facility] s/p trip and fall. Imaging studies at [facility] revealed right intraparenchymal hemorrhage prompting transfer to [facility] for neurosurgical evaluation & management. On arrival patient with no acute complaints. States she tripped and fell, no loss of consciousness, reports she takes a baby aspirin daily. No other reported anticoagulation / antiplatelet use. Airway: intact, c-collar in place on arrival. Breathing: breath sounds CTA b/l, no accessory muscle use, no conversational dyspnea. Circulation: 2+ central & peripheral pulses b/l. Disability: pupils 2mm round and reactive to light b/l, GCS15. Exposure: fully exposed and covered w/ warm blankets. CXR & pelvis XR performed without obvious acute traumatic findings. PIV placed by RN and labs drawn. istat pH 7.45, base excess +5, Hgb 9.9. (Hgb 10.3 on blood work from [facility]).    Imaging studies from [facility] reviewed with Dr. Conley. CT head showed R intraparenchymal hemorrhage. CT cervical spine without acute findings, degenerative changes noted. Neurosurgical consult placed @ 1045. After physical exam and pt without neuro deficits / distracting injuries, cervical collar cleared by resident MD Taylor.  (25 May 2022 10:47)    PAST MEDICAL & SURGICAL HISTORY:  Diabetes mellitus  Hyperlipidemia  Hypothyroidism  S/P cholecystectomy      REVIEW OF SYSTEMS:    CONSTITUTIONAL: No fever, weight loss, or fatigue  EYES: No eye pain, visual disturbances, or discharge  ENMT:  No difficulty hearing, tinnitus, vertigo; No sinus or throat pain  NECK: No pain or stiffness  BREASTS: No pain, masses, or nipple discharge  RESPIRATORY: No cough, wheezing, chills or hemoptysis; No shortness of breath  CARDIOVASCULAR: No chest pain, palpitations, dizziness, or leg swelling  GASTROINTESTINAL: No abdominal or epigastric pain. No nausea, vomiting, or hematemesis; No diarrhea or constipation. No melena or hematochezia.  GENITOURINARY: No dysuria, frequency, hematuria, or incontinence  NEUROLOGICAL: No headaches, memory loss, loss of strength, numbness, or tremors  SKIN: No itching, burning, rashes, or lesions   LYMPH NODES: No enlarged glands  ENDOCRINE: No heat or cold intolerance; No hair loss  MUSCULOSKELETAL: No joint pain or swelling; No muscle, back, or extremity pain  PSYCHIATRIC: No depression, anxiety, mood swings, or difficulty sleeping  HEME/LYMPH: No easy bruising, or bleeding gums  ALLERY AND IMMUNOLOGIC: No hives or eczema    Allergies    Celebrex (Unknown)  iodine (Unknown)    Intolerances      MEDICATIONS  (STANDING):    MEDICATIONS  (PRN):    SOCIAL HISTORY:  FAMILY HISTORY:    Vital Signs Last 24 Hrs  T(C): --  T(F): --  HR: 87 (25 May 2022 10:42) (87 - 87)  BP: 134/74 (25 May 2022 10:42) (134/74 - 134/74)  BP(mean): --  RR: 18 (25 May 2022 10:42) (18 - 18)  SpO2: 99% (25 May 2022 10:42) (99% - 99%)    PHYSICAL EXAM:    GENERAL: NAD, well-groomed, well-developed  HEAD:  Atraumatic, Normocephalic  EYES: EOMI, PERRLA, conjunctiva and sclera clear  ENMT: No tonsillar erythema, exudates, or enlargement; Moist mucous membranes, Good dentition, No lesions  NECK: Supple, No JVD, Normal thyroid  NERVOUS SYSTEM:  Alert & Oriented X3, Good concentration; Motor Strength 5/5 B/L upper and lower extremities; DTRs 2+ intact and symmetric  CHEST/LUNG: Clear to percussion bilaterally; No rales, rhonchi, wheezing, or rubs  HEART: Regular rate and rhythm; No murmurs, rubs, or gallops  ABDOMEN: Soft, Nontender, Nondistended; Bowel sounds present  EXTREMITIES:  2+ Peripheral Pulses, No clubbing, cyanosis, or edema  LYMPH: No lymphadenopathy noted  SKIN: No rashes or lesions    LABS:                        9.7    9.84  )-----------( 173      ( 25 May 2022 11:16 )             30.1         RADIOLOGY & ADDITIONAL STUDIES:  ~~~~~~~~~~~~~~~~~~~~~~~~~~~~~~   HPI:  Patient is a 89 y/o [place] [facility] [place]. Imaging studies at [place] revealed right intraparenchymal hemorrhage prompting transfer to [facility] for neurosurgical evaluation & management. On arrival patient with no acute complaints. States she tripped and fell, no loss of consciousness, reports she takes a baby aspirin daily. No other reported anticoagulation / antiplatelet use. Airway: intact, c-collar in place on arrival. Breathing: breath sounds CTA b/l, no accessory muscle use, no conversational dyspnea. Circulation: 2+ central & peripheral pulses b/l. Disability: pupils 2mm round and reactive to light b/l, GCS15. Exposure: fully exposed and covered w/ warm blankets. CXR & pelvis XR performed without obvious acute traumatic findings. PIV placed by RN and labs drawn. istat pH 7.45, base excess +5, Hgb 9.9. (Hgb 10.3 on blood work from [facility]).    Imaging [place] [facility] [place] Dr. Conley. CT head showed R intraparenchymal hemorrhage. CT cervical spine without acute findings, degenerative changes noted. Neurosurgical consult placed @ 1045. After physical exam and pt without neuro deficits / distracting injuries, cervical collar cleared by resident MD Taylor.  (25 May 2022 10:47)   Pt seen as a trauma B transferred to [place] from [place] she states that she tripped and fell while fixing her bed and struck head and dresser.   Pt denies any LOC. Pt states that initially she thought that she did not hurtful self and then noticed blood all around her.     PAST MEDICAL & SURGICAL HISTORY:  Diabetes mellitus  Hyperlipidemia  Hypothyroidism  S/P cholecystectomy      REVIEW OF SYSTEMS:    CONSTITUTIONAL: No fever, weight loss, or fatigue  EYES: No eye pain, visual disturbances, or discharge  ENMT:  No difficulty hearing, tinnitus, vertigo; No sinus or throat pain, slight discomfort on head occipital region due to striking head  NECK: No pain or stiffness  BREASTS: No pain, masses, or nipple discharge  RESPIRATORY: No cough, wheezing, chills or hemoptysis; No shortness of breath  CARDIOVASCULAR: No chest pain, palpitations, dizziness, or leg swelling  GASTROINTESTINAL: No abdominal or epigastric pain. No nausea, vomiting, or hematemesis; No diarrhea or constipation. No melena or hematochezia.  GENITOURINARY: No dysuria, frequency, hematuria, or incontinence  NEUROLOGICAL: pos headaches, memory loss, loss of strength, numbness, or tremors  SKIN: No itching, burning, rashes, or lesions   LYMPH NODES: No enlarged glands  ENDOCRINE: No heat or cold intolerance; No hair loss  MUSCULOSKELETAL: No joint pain or swelling; No muscle, back, or extremity pain  PSYCHIATRIC: No depression, anxiety, mood swings, or difficulty sleeping  HEME/LYMPH: No easy bruising, or bleeding gums  ALLERY AND IMMUNOLOGIC: No hives or eczema    Allergies  Celebrex (Unknown)  iodine (Unknown)  Intolerances    MEDICATIONS  (STANDING):    MEDICATIONS  (PRN):    SOCIAL HISTORY:  FAMILY HISTORY:    Vital Signs Last 24 Hrs  T(C): --  T(F): --  HR: 87 (25 May 2022 10:42) (87 - 87)  BP: 134/74 (25 May 2022 10:42) (134/74 - 134/74)  BP(mean): --  RR: 18 (25 May 2022 10:42) (18 - 18)  SpO2: 99% (25 May 2022 10:42) (99% - 99%)    PHYSICAL EXAM:    GENERAL: NAD,   HEAD:  Normocephalic, staples in the midline of the occipital  region  EYES: EOMI, PERRLA, conjunctiva and sclera clear  ENMT: No tonsillar erythema, exudates, or enlargement; Moist mucous membranes, neg facial;  NECK: Supple,   NERVOUS SYSTEM:  Alert & Oriented X3, Good concentration; Motor Strength 5/5 B/L upper and lower extremities; DTRs 2+ intact and symmetric, pronators  CHEST/LUNG: Clear Bs bilaterally;  HEART: Regular rate and rhythm; No murmurs, rubs, or gallops  ABDOMEN: Soft, Nontender, Nondistended; Bowel sounds present  EXTREMITIES:  2+ Peripheral Pulses, No edema    LABS:                        9.7    9.84  )-----------( 173      ( 25 May 2022 11:16 )             30.1         RADIOLOGY & ADDITIONAL STUDIES:  ~~~~~~~~~~~~~~~~~~~~~~~~~~~~~~   HPI:  Patient is a 89 y/o female transfer from [facility] s/p trip and fall. Imaging studies at [place] revealed right intraparenchymal hemorrhage prompting transfer to [facility] for neurosurgical evaluation & management. On arrival patient with no acute complaints. States she tripped and fell, no loss of consciousness, reports she takes a baby aspirin daily. No other reported anticoagulation / antiplatelet use. Airway: intact, c-collar in place on arrival. Breathing: breath sounds CTA b/l, no accessory muscle use, no conversational dyspnea. Circulation: 2+ central & peripheral pulses b/l. Disability: pupils 2mm round and reactive to light b/l, GCS15. Exposure: fully exposed and covered w/ warm blankets. CXR & pelvis XR performed without obvious acute traumatic findings. PIV placed by RN and labs drawn. istat pH 7.45, base excess +5, Hgb 9.9. (Hgb 10.3 on blood work from [facility]).    Imaging studies from [facility] reviewed with Dr. Conley. CT head showed R intraparenchymal hemorrhage. CT cervical spine without acute findings, degenerative changes noted. Neurosurgical consult placed @ 1045. After physical exam and pt without neuro deficits / distracting injuries, cervical collar cleared by resident MD Taylor.  (25 May 2022 10:47)   Pt seen as a trauma B transferred to [place] from [place] she states that she tripped and fell while fixing her bed and struck head and dresser.   Pt denies any LOC. Pt states that initially she thought that she did not hurtful self and then noticed blood all around her.     PAST MEDICAL & SURGICAL HISTORY:  Diabetes mellitus  Hyperlipidemia  Hypothyroidism  S/P cholecystectomy      REVIEW OF SYSTEMS:    CONSTITUTIONAL: No fever, weight loss, or fatigue  EYES: No eye pain, visual disturbances, or discharge  ENMT:  No difficulty hearing, tinnitus, vertigo; No sinus or throat pain, slight discomfort on head occipital region due to striking head  NECK: No pain or stiffness  BREASTS: No pain, masses, or nipple discharge  RESPIRATORY: No cough, wheezing, chills or hemoptysis; No shortness of breath  CARDIOVASCULAR: No chest pain, palpitations, dizziness, or leg swelling  GASTROINTESTINAL: No abdominal or epigastric pain. No nausea, vomiting, or hematemesis; No diarrhea or constipation. No melena or hematochezia.  GENITOURINARY: No dysuria, frequency, hematuria, or incontinence  NEUROLOGICAL: pos headaches, memory loss, loss of strength, numbness, or tremors  SKIN: No itching, burning, rashes, or lesions   LYMPH NODES: No enlarged glands  ENDOCRINE: No heat or cold intolerance; No hair loss  MUSCULOSKELETAL: No joint pain or swelling; No muscle, back, or extremity pain  PSYCHIATRIC: No depression, anxiety, mood swings, or difficulty sleeping  HEME/LYMPH: No easy bruising, or bleeding gums  ALLERY AND IMMUNOLOGIC: No hives or eczema    Allergies  Celebrex (Unknown)  iodine (Unknown)  Intolerances    MEDICATIONS  (STANDING):    MEDICATIONS  (PRN):    SOCIAL HISTORY:  FAMILY HISTORY:    Vital Signs Last 24 Hrs  T(C): --  T(F): --  HR: 87 (25 May 2022 10:42) (87 - 87)  BP: 134/74 (25 May 2022 10:42) (134/74 - 134/74)  BP(mean): --  RR: 18 (25 May 2022 10:42) (18 - 18)  SpO2: 99% (25 May 2022 10:42) (99% - 99%)    PHYSICAL EXAM:    GENERAL: NAD,   HEAD:  Normocephalic, staples in the midline of the occipital  region  EYES: EOMI, PERRLA, conjunctiva and sclera clear  ENMT: No tonsillar erythema, exudates, or enlargement; Moist mucous membranes, neg facial;  NECK: Supple, non tender with palp  muscular - in the posterior region high thoracic pos ecchymosis and swelling noted mid line.   NERVOUS SYSTEM:  Alert & Oriented X3, Good concentration; Motor Strength 5/5 B/L upper and lower extremities; DTRs 2+ intact and symmetric, pronators  CHEST/LUNG: Clear Bs bilaterally;  HEART: Regular rate and rhythm; No murmurs, rubs, or gallops  ABDOMEN: Soft, Nontender, Nondistended; Bowel sounds present  EXTREMITIES:  2+ Peripheral Pulses, No edema    LABS:                        9.7    9.84  )-----------( 173      ( 25 May 2022 11:16 )             30.1         RADIOLOGY & ADDITIONAL STUDIES:  ~~~~~~~~~~~~~~~~~~~~~~~~~~~~~~  ct of the brain at 9:33 am on  5/25  right parietal convexity Intraparenchymal  2.2 cm  lesion with min surrounding edema. Neg infarct.     Ct of the cervical spine neg for fracture or deformity. Pos degenerative changes throughout   HPI:  Patient is a 89 y/o female transfer from [facility] s/p trip and fall. Imaging studies at [place] revealed right intraparenchymal hemorrhage prompting transfer to [facility] for neurosurgical evaluation & management. On arrival patient with no acute complaints. States she tripped and fell, no loss of consciousness, reports she takes a baby aspirin daily. No other reported anticoagulation / antiplatelet use. Airway: intact, c-collar in place on arrival. Breathing: breath sounds CTA b/l, no accessory muscle use, no conversational dyspnea. Circulation: 2+ central & peripheral pulses b/l. Disability: pupils 2mm round and reactive to light b/l, GCS15. Exposure: fully exposed and covered w/ warm blankets. CXR & pelvis XR performed without obvious acute traumatic findings. PIV placed by RN and labs drawn. istat pH 7.45, base excess +5, Hgb 9.9. (Hgb 10.3 on blood work from [facility]).    Imaging studies from [facility] reviewed with Dr. Conley. CT head showed R intraparenchymal hemorrhage. CT cervical spine without acute findings, degenerative changes noted. Neurosurgical consult placed @ 1045. After physical exam and pt without neuro deficits / distracting injuries, cervical collar cleared by resident MD Taylor.  (25 May 2022 10:47)   Pt seen as a trauma B transferred to [place] from [place] she states that she tripped and fell while fixing her bed and struck head and dresser.   Pt denies any LOC. Pt states that initially she thought that she did not hurtful self and then noticed blood all around her.     PAST MEDICAL & SURGICAL HISTORY:  Diabetes mellitus  Hyperlipidemia  Hypothyroidism  S/P cholecystectomy      REVIEW OF SYSTEMS:    CONSTITUTIONAL: No fever, weight loss, or fatigue  EYES: No eye pain, visual disturbances, or discharge  ENMT:  No difficulty hearing, tinnitus, vertigo; No sinus or throat pain, slight discomfort on head occipital region due to striking head  NECK: No pain or stiffness  BREASTS: No pain, masses, or nipple discharge  RESPIRATORY: No cough, wheezing, chills or hemoptysis; No shortness of breath  CARDIOVASCULAR: No chest pain, palpitations, dizziness, or leg swelling  GASTROINTESTINAL: No abdominal or epigastric pain. No nausea, vomiting, or hematemesis; No diarrhea or constipation. No melena or hematochezia.  GENITOURINARY: No dysuria, frequency, hematuria, or incontinence  NEUROLOGICAL: pos headaches, memory loss, loss of strength, numbness, or tremors  SKIN: No itching, burning, rashes, or lesions   LYMPH NODES: No enlarged glands  ENDOCRINE: No heat or cold intolerance; No hair loss  MUSCULOSKELETAL: No joint pain or swelling; No muscle, back, or extremity pain  PSYCHIATRIC: No depression, anxiety, mood swings, or difficulty sleeping  HEME/LYMPH: No easy bruising, or bleeding gums  ALLERY AND IMMUNOLOGIC: No hives or eczema    Allergies  Celebrex (Unknown)  iodine (Unknown)  Intolerances    MEDICATIONS  (STANDING):    MEDICATIONS  (PRN):    SOCIAL HISTORY:  FAMILY HISTORY:    Vital Signs Last 24 Hrs  T(C): --  T(F): --  HR: 87 (25 May 2022 10:42) (87 - 87)  BP: 134/74 (25 May 2022 10:42) (134/74 - 134/74)  BP(mean): --  RR: 18 (25 May 2022 10:42) (18 - 18)  SpO2: 99% (25 May 2022 10:42) (99% - 99%)    PHYSICAL EXAM:    GENERAL: NAD,   HEAD:  Normocephalic, staples in the midline of the occipital  region  EYES: EOMI, PERRLA, conjunctiva and sclera clear  ENMT: No tonsillar erythema, exudates, or enlargement; Moist mucous membranes, slight facial flattening.   NECK: Supple, non tender with palp  muscular - in the posterior region high thoracic pos ecchymosis and swelling noted mid line.   NERVOUS SYSTEM:  Alert & Oriented X3, Good concentration; Motor Strength 5/5 B/L upper and lower extremities; DTRs 2+ intact and symmetric, pronators  CHEST/LUNG: Clear Bs bilaterally;  HEART: Regular rate and rhythm; No murmurs, rubs, or gallops  ABDOMEN: Soft, Nontender, Nondistended; Bowel sounds present  EXTREMITIES:  2+ Peripheral Pulses, No edema    LABS:                        9.7    9.84  )-----------( 173      ( 25 May 2022 11:16 )             30.1         RADIOLOGY & ADDITIONAL STUDIES:  ~~~~~~~~~~~~~~~~~~~~~~~~~~~~~~  ct of the brain at 9:33 am on  5/25  right parietal convexity Intraparenchymal  2.2 cm  lesion with min surrounding edema. Neg infarct.     Ct of the cervical spine neg for fracture or deformity. Pos degenerative changes throughout

## 2022-05-26 LAB
A1C WITH ESTIMATED AVERAGE GLUCOSE RESULT: 5.3 % — SIGNIFICANT CHANGE UP (ref 4–5.6)
ALBUMIN SERPL ELPH-MCNC: 3.4 G/DL — SIGNIFICANT CHANGE UP (ref 3.3–5.2)
ALP SERPL-CCNC: 159 U/L — HIGH (ref 40–120)
ALT FLD-CCNC: 128 U/L — HIGH
ANION GAP SERPL CALC-SCNC: 13 MMOL/L — SIGNIFICANT CHANGE UP (ref 5–17)
AST SERPL-CCNC: 202 U/L — HIGH
BILIRUB SERPL-MCNC: 0.6 MG/DL — SIGNIFICANT CHANGE UP (ref 0.4–2)
BUN SERPL-MCNC: 14.8 MG/DL — SIGNIFICANT CHANGE UP (ref 8–20)
CALCIUM SERPL-MCNC: 8.5 MG/DL — LOW (ref 8.6–10.2)
CHLORIDE SERPL-SCNC: 101 MMOL/L — SIGNIFICANT CHANGE UP (ref 98–107)
CHOLEST SERPL-MCNC: 109 MG/DL — SIGNIFICANT CHANGE UP
CO2 SERPL-SCNC: 22 MMOL/L — SIGNIFICANT CHANGE UP (ref 22–29)
CREAT SERPL-MCNC: 0.44 MG/DL — LOW (ref 0.5–1.3)
EGFR: 88 ML/MIN/1.73M2 — SIGNIFICANT CHANGE UP
ESTIMATED AVERAGE GLUCOSE: 105 MG/DL — SIGNIFICANT CHANGE UP (ref 68–114)
FERRITIN SERPL-MCNC: 180 NG/ML — HIGH (ref 15–150)
FOLATE SERPL-MCNC: >20 NG/ML — SIGNIFICANT CHANGE UP
GLUCOSE BLDC GLUCOMTR-MCNC: 80 MG/DL — SIGNIFICANT CHANGE UP (ref 70–99)
GLUCOSE SERPL-MCNC: 79 MG/DL — SIGNIFICANT CHANGE UP (ref 70–99)
HCT VFR BLD CALC: 28 % — LOW (ref 34.5–45)
HDLC SERPL-MCNC: 49 MG/DL — LOW
HGB BLD-MCNC: 9 G/DL — LOW (ref 11.5–15.5)
IRON SATN MFR SERPL: 10 % — LOW (ref 14–50)
IRON SATN MFR SERPL: 34 UG/DL — LOW (ref 37–145)
LIPID PNL WITH DIRECT LDL SERPL: 43 MG/DL — SIGNIFICANT CHANGE UP
MAGNESIUM SERPL-MCNC: 1.9 MG/DL — SIGNIFICANT CHANGE UP (ref 1.6–2.6)
MCHC RBC-ENTMCNC: 29.8 PG — SIGNIFICANT CHANGE UP (ref 27–34)
MCHC RBC-ENTMCNC: 32.1 GM/DL — SIGNIFICANT CHANGE UP (ref 32–36)
MCV RBC AUTO: 92.7 FL — SIGNIFICANT CHANGE UP (ref 80–100)
MRSA PCR RESULT.: SIGNIFICANT CHANGE UP
NON HDL CHOLESTEROL: 60 MG/DL — SIGNIFICANT CHANGE UP
PHOSPHATE SERPL-MCNC: 3.2 MG/DL — SIGNIFICANT CHANGE UP (ref 2.4–4.7)
PLATELET # BLD AUTO: 150 K/UL — SIGNIFICANT CHANGE UP (ref 150–400)
POTASSIUM SERPL-MCNC: 4.2 MMOL/L — SIGNIFICANT CHANGE UP (ref 3.5–5.3)
POTASSIUM SERPL-SCNC: 4.2 MMOL/L — SIGNIFICANT CHANGE UP (ref 3.5–5.3)
PREALB SERPL-MCNC: 17 MG/DL — LOW (ref 18–38)
PROT SERPL-MCNC: 6.2 G/DL — LOW (ref 6.6–8.7)
RBC # BLD: 3.02 M/UL — LOW (ref 3.8–5.2)
RBC # FLD: 14.9 % — HIGH (ref 10.3–14.5)
S AUREUS DNA NOSE QL NAA+PROBE: SIGNIFICANT CHANGE UP
SODIUM SERPL-SCNC: 135 MMOL/L — SIGNIFICANT CHANGE UP (ref 135–145)
TIBC SERPL-MCNC: 325 UG/DL — SIGNIFICANT CHANGE UP (ref 220–430)
TRANSFERRIN SERPL-MCNC: 227 MG/DL — SIGNIFICANT CHANGE UP (ref 192–382)
TRIGL SERPL-MCNC: 86 MG/DL — SIGNIFICANT CHANGE UP
TSH SERPL-MCNC: 0.79 UIU/ML — SIGNIFICANT CHANGE UP (ref 0.27–4.2)
VIT B12 SERPL-MCNC: 972 PG/ML — SIGNIFICANT CHANGE UP (ref 232–1245)
WBC # BLD: 7.21 K/UL — SIGNIFICANT CHANGE UP (ref 3.8–10.5)
WBC # FLD AUTO: 7.21 K/UL — SIGNIFICANT CHANGE UP (ref 3.8–10.5)

## 2022-05-26 PROCEDURE — 99232 SBSQ HOSP IP/OBS MODERATE 35: CPT

## 2022-05-26 PROCEDURE — 70450 CT HEAD/BRAIN W/O DYE: CPT | Mod: 26

## 2022-05-26 PROCEDURE — 99291 CRITICAL CARE FIRST HOUR: CPT

## 2022-05-26 RX ORDER — FERROUS SULFATE 325(65) MG
325 TABLET ORAL DAILY
Refills: 0 | Status: DISCONTINUED | OUTPATIENT
Start: 2022-05-26 | End: 2022-06-07

## 2022-05-26 RX ORDER — ASCORBIC ACID 60 MG
500 TABLET,CHEWABLE ORAL DAILY
Refills: 0 | Status: DISCONTINUED | OUTPATIENT
Start: 2022-05-26 | End: 2022-06-07

## 2022-05-26 RX ORDER — LEVOTHYROXINE SODIUM 125 MCG
50 TABLET ORAL DAILY
Refills: 0 | Status: DISCONTINUED | OUTPATIENT
Start: 2022-05-26 | End: 2022-06-07

## 2022-05-26 RX ORDER — ACETAMINOPHEN 500 MG
700 TABLET ORAL ONCE
Refills: 0 | Status: DISCONTINUED | OUTPATIENT
Start: 2022-05-26 | End: 2022-05-26

## 2022-05-26 RX ORDER — ATORVASTATIN CALCIUM 80 MG/1
1 TABLET, FILM COATED ORAL
Qty: 0 | Refills: 0 | DISCHARGE

## 2022-05-26 RX ORDER — LEVETIRACETAM 250 MG/1
500 TABLET, FILM COATED ORAL
Refills: 0 | Status: DISCONTINUED | OUTPATIENT
Start: 2022-05-26 | End: 2022-05-28

## 2022-05-26 RX ORDER — NATEGLINIDE 60 MG/1
1 TABLET, COATED ORAL
Qty: 0 | Refills: 0 | DISCHARGE

## 2022-05-26 RX ORDER — INSULIN LISPRO 100/ML
VIAL (ML) SUBCUTANEOUS
Refills: 0 | Status: DISCONTINUED | OUTPATIENT
Start: 2022-05-26 | End: 2022-05-26

## 2022-05-26 RX ADMIN — OXYCODONE HYDROCHLORIDE 5 MILLIGRAM(S): 5 TABLET ORAL at 17:05

## 2022-05-26 RX ADMIN — OXYCODONE HYDROCHLORIDE 5 MILLIGRAM(S): 5 TABLET ORAL at 05:00

## 2022-05-26 RX ADMIN — OXYCODONE HYDROCHLORIDE 5 MILLIGRAM(S): 5 TABLET ORAL at 22:24

## 2022-05-26 RX ADMIN — OXYCODONE HYDROCHLORIDE 5 MILLIGRAM(S): 5 TABLET ORAL at 04:17

## 2022-05-26 RX ADMIN — Medication 50 MICROGRAM(S): at 22:24

## 2022-05-26 RX ADMIN — POLYETHYLENE GLYCOL 3350 17 GRAM(S): 17 POWDER, FOR SOLUTION ORAL at 10:55

## 2022-05-26 RX ADMIN — LEVETIRACETAM 400 MILLIGRAM(S): 250 TABLET, FILM COATED ORAL at 05:05

## 2022-05-26 RX ADMIN — OXYCODONE HYDROCHLORIDE 5 MILLIGRAM(S): 5 TABLET ORAL at 23:05

## 2022-05-26 RX ADMIN — Medication 1 TABLET(S): at 17:05

## 2022-05-26 RX ADMIN — LEVETIRACETAM 500 MILLIGRAM(S): 250 TABLET, FILM COATED ORAL at 17:06

## 2022-05-26 RX ADMIN — OXYCODONE HYDROCHLORIDE 5 MILLIGRAM(S): 5 TABLET ORAL at 17:35

## 2022-05-26 RX ADMIN — SENNA PLUS 2 TABLET(S): 8.6 TABLET ORAL at 20:53

## 2022-05-26 RX ADMIN — Medication 325 MILLIGRAM(S): at 10:54

## 2022-05-26 RX ADMIN — OXYCODONE HYDROCHLORIDE 5 MILLIGRAM(S): 5 TABLET ORAL at 11:25

## 2022-05-26 RX ADMIN — OXYCODONE HYDROCHLORIDE 5 MILLIGRAM(S): 5 TABLET ORAL at 10:55

## 2022-05-26 RX ADMIN — CHLORHEXIDINE GLUCONATE 1 APPLICATION(S): 213 SOLUTION TOPICAL at 10:55

## 2022-05-26 RX ADMIN — Medication 500 MILLIGRAM(S): at 10:54

## 2022-05-26 NOTE — PROGRESS NOTE ADULT - ASSESSMENT
89F PMH DM. HLD, hypothyroidism on ASA [place] [facility] [place] found with right parietal IPH that has worsened in size since initial CTH. Patient remains awake with stable neurologic exam.     PLAN:  -PLAN PENDING AM ROUNDS AND DISCUSSION WITH NEUROSURGEON  -NEURO CHECKS q1 hour  -repeat CTH in AM for stability  -SBP GOAL 100-140 - PRN: labetalol/ hydralazine  -PRN pain regimen. please avoid oversedation  -PRN BM regimen  -Monitor NA current 134 - will recheck in AM  -PT/OT/SLP  -Chest PT/ Incentive spirometry/ pulm toileting PRN  -OOB daily

## 2022-05-26 NOTE — PHYSICAL THERAPY INITIAL EVALUATION ADULT - PERTINENT HX OF CURRENT PROBLEM, REHAB EVAL
Patient is a 89 [place]/o [place] [facility] [place]. Imaging studies at [place] revealed right intraparenchymal hemorrhage prompting transfer to [facility] for neurosurgical evaluation & management.

## 2022-05-26 NOTE — PROGRESS NOTE ADULT - SUBJECTIVE AND OBJECTIVE BOX
SUMMARY:HPI:  Patient is a 89 [place]/o [place] [facility] [place]. Imaging studies at [place] revealed right intraparenchymal hemorrhage prompting transfer to [facility] for neurosurgical evaluation & management. On arrival patient with no acute complaints. States she tripped and fell, no loss of consciousness, reports she takes a baby aspirin daily. No other reported anticoagulation / antiplatelet use. Airway: intact, c-collar in place on arrival. Breathing: breath sounds CTA b/l, no accessory muscle use, no conversational dyspnea. Circulation: 2+ central & peripheral pulses b/l. Disability: pupils 2mm round and reactive to light b/l, GCS15. Exposure: fully exposed and covered w/ warm blankets. CXR & pelvis XR performed without obvious acute traumatic findings. PIV placed by RN and labs drawn. istat pH 7.45, base excess +5, Hgb 9.9. (Hgb 10.3 on blood work [place] [facility] [place]    [place] [facility] [place]. Conley. CT head showed R intraparenchymal hemorrhage. CT cervical spine without acute findings, degenerative changes noted. Neurosurgical consult placed @ 1045. After physical exam and pt without neuro deficits / distracting injuries, cervical collar cleared by resident MD Taylor.  (25 May 2022 10:47)    OVERNIGHT EVENTS:     ADMISSION SCORES:   GCS: HH: MF: NIHSS: RASS: CAM-ICU: ICP:  CLINICAL TRIALS:  Allergies    Celebrex (Unknown)  iodine (Unknown)    Intolerances        REVIEW OF SYSTEMS: [X ] Unable to Assess due to neurologic exam    Neuro: [ ] Headache [ ] Back pain [ ] Numbness [ ] Weakness [ ] Ataxia [ ] Dizziness [ ] Aphasia [ ] Dysarthria [ ] Visual disturbance  Resp: [ ] Shortness of breath/dyspnea, [ ] Orthopnea [ ] Cough  CV: [ ] Chest pain [ ] Palpitation [ ] Lightheadedness [ ] Syncope  Renal: [ ] Thirst [ ] Edema  GI: [ ] Nausea [ ] Emesis [ ] Abdominal pain [ ] Constipation [ ] Diarrhea  Hem: [ ] Hematemesis [ ] bright red blood per rectum  ID: [ ] Fever [ ] Chills [ ] Dysuria  ENT: [ ] Rhinorrhea        VITALS: [X ] Reviewed  Vital Signs Last 24 Hrs  T(C): 36.8 (26 May 2022 04:25), Max: 37 (25 May 2022 23:42)  T(F): 98.3 (26 May 2022 04:25), Max: 98.6 (25 May 2022 23:42)  HR: 68 (26 May 2022 06:00) (59 - 94)  BP: 109/55 (26 May 2022 06:00) (90/63 - 136/74)  BP(mean): 72 (26 May 2022 06:00) (60 - 103)  RR: 12 (26 May 2022 06:00) (12 - 20)  SpO2: 99% (26 May 2022 06:00) (98% - 100%)    MEDICATIONS  (STANDING):  chlorhexidine 2% Cloths 1 Application(s) Topical daily  dextrose 5%. 1000 milliLiter(s) (50 mL/Hr) IV Continuous <Continuous>  dextrose 50% Injectable 25 Gram(s) IV Push once  glucagon  Injectable 1 milliGRAM(s) IntraMuscular once  insulin lispro (ADMELOG) corrective regimen sliding scale   SubCutaneous every 6 hours  lactated ringers. 1000 milliLiter(s) (50 mL/Hr) IV Continuous <Continuous>  levETIRAcetam  IVPB 500 milliGRAM(s) IV Intermittent every 12 hours  polyethylene glycol 3350 17 Gram(s) Oral daily  senna 2 Tablet(s) Oral at bedtime    MEDICATIONS  (PRN):  dextrose Oral Gel 15 Gram(s) Oral once PRN Blood Glucose LESS THAN 70 milliGRAM(s)/deciliter  hydrALAZINE Injectable 10 milliGRAM(s) IV Push every 2 hours PRN SBP > 140  labetalol Injectable 10 milliGRAM(s) IV Push every 2 hours PRN Systolic blood pressure > 140  ondansetron Injectable 4 milliGRAM(s) IV Push every 6 hours PRN Nausea and/or Vomiting  oxyCODONE    IR 5 milliGRAM(s) Oral every 6 hours PRN Moderate Pain (4 - 6)    I&O's Summary    25 May 2022 07:01  -  26 May 2022 06:58  --------------------------------------------------------  IN: 810 mL / OUT: 850 mL / NET: -40 mL        CAPILLARY BLOOD GLUCOSE      POCT Blood Glucose.: 80 mg/dL (25 May 2022 23:50)  POCT Blood Glucose.: 280 mg/dL (25 May 2022 18:12)        LABS:  PT/INR - ( 25 May 2022 11:16 )   PT: 13.0 sec;   INR: 1.12 ratio         PTT - ( 25 May 2022 11:16 )  PTT:27.6 sec  05-26    135  |  101  |  14.8  ----------------------------<  79  4.2   |  22.0  |  0.44<L>    Ca    8.5<L>      26 May 2022 02:39  Phos  3.2     05-26  Mg     1.9     05-26    TPro  6.2<L>  /  Alb  3.4  /  TBili  0.6  /  DBili  x   /  AST  202<H>  /  ALT  128<H>  /  AlkPhos  159<H>  05-26                          9.0    7.21  )-----------( 150      ( 26 May 2022 02:39 )             28.0      CT Head No Cont (05.25.22 @ 22:22) >    COMPARISON STUDY: Head CT scans from 05/25/2022 at 3:13 PM and 8:54 AM    FINDINGS:  Approximately 2.7 x 2.2 x 3.2 cm intraparenchymal hematoma in the right   parietal lobe (2:32 and 5:34) is slightly increased in size from 2.4 x 2   x 3.2 cm on 05/22/2022 at 3:13 PM and 2.2 x 1.2 x 2 cm on 05/25/2022 at   8:54 AM.  Mild surrounding vasogenic edema and regional mass effect is   grossly stable from 05/25/2022 at 3:13 PM and slightly increased from   05/25/2022 at 8:54 AM.  Trace right parietal subarachnoid hemorrhage   adjacent to the into parenchymal hematoma is stable.  Trace subdural   hemorrhage layering along right cerebellar tentorium is stable.    There is mild generalized cerebral volume loss and mild periventricular   white matter hypoattenuation compatible chronic microvascular ischemic   disease.      The visualized paranasal sinuses and mastoids are grossly clear.    The patient is status post cataract lens placement surgery bilaterally.    The calvarium and skull base appear unremarkable.  There is a posterior   parietal-occipital scalp laceration with skin staples in place.    IMPRESSION:  An approximately 2.7 x 2.2 x 3.2 cm right parietal intraparenchymal   hemorrhage is slightly increased in size from 2.4 x 2 x 3.2 cm on   05/22/2022 at 3:13 PM and 2.2 x 1.2 x 2 cm and 05/25/2022 at 8:54 AM.    Trace right parietal subarachnoid hemorrhage and trace subdural   hemorrhage along the right cerebellar tentorium are stable.          EXAMINATION:  PHYSICAL EXAM:    Constitutional: No Acute Distress     Neurological: Awake, alert oriented to person, place and time, Following Commands, PERRL, EOMI, No Gaze Preference, Face Symmetrical, Speech Fluent, No dysmetria, No ataxia, No nystagmus     Motor exam:          Upper extremity                         Delt     Bicep     Tricep    HG                                                 R         5/5        5/5        5/5       5/5                                               L          5/5        5/5        5/5       5/5          Lower extremity                        HF         KF        KE       DF         PF                                                  R        5/5        5/5        5/5       5/5         5/5                                               L         5/5        5/5       5/5       5/5          5/5                                                 Sensation: [ ] intact to light touch  [ ] decreased:     Reflexes: Deep Tendon Reflexes Intact     Pulmonary: Clear to Auscultation, No rales, No rhonchi, No wheezes     Cardiovascular: S1, S2, Regular rate and rhythm     Gastrointestinal: Soft, Non-tender, Non-distended     Extremities: No calf tenderness     Incision:    SUMMARY:HPI:  Patient is a 89 y/o female [place] [facility] [place] and [place]. Imaging studies at [place] revealed right intraparenchymal hemorrhage prompting transfer to [facility] for neurosurgical evaluation & management. On arrival patient with no acute complaints. States she tripped and fell, no loss of consciousness, reports she takes a baby aspirin daily. No other reported anticoagulation / antiplatelet use. Airway: intact, c-collar in place on arrival. Breathing: breath sounds CTA b/l, no accessory muscle use, no conversational dyspnea. Circulation: 2+ central & peripheral pulses b/l. Disability: pupils 2mm round and reactive to light b/l, GCS15. Exposure: fully exposed and covered w/ warm blankets. CXR & pelvis XR performed without obvious acute traumatic findings. PIV placed by RN and labs drawn. istat pH 7.45, base excess +5, Hgb 9.9. (Hgb 10.3 on blood work from [facility] [place]).    Imaging studies from [facility] reviewed with Dr. Conley. CT head showed R intraparenchymal hemorrhage. CT cervical spine without acute findings, degenerative changes noted. Neurosurgical consult placed @ 1045. After physical exam and pt without neuro deficits / distracting injuries, cervical collar cleared by resident MD Taylor.  (25 May 2022 10:47)    OVERNIGHT EVENTS:     ADMISSION SCORES:   MRS- 1; GCS - 15  Allergies    Celebrex (Unknown)  iodine (Unknown)    Intolerances        REVIEW OF SYSTEMS: [X ] Unable to Assess due to neurologic exam    Neuro: [ ] Headache [ ] Back pain [ ] Numbness [ ] Weakness [ ] Ataxia [ ] Dizziness [ ] Aphasia [ ] Dysarthria [ ] Visual disturbance  Resp: [ ] Shortness of breath/dyspnea, [ ] Orthopnea [ ] Cough  CV: [ ] Chest pain [ ] Palpitation [ ] Lightheadedness [ ] Syncope  Renal: [ ] Thirst [ ] Edema  GI: [ ] Nausea [ ] Emesis [ ] Abdominal pain [ ] Constipation [ ] Diarrhea  Hem: [ ] Hematemesis [ ] bright red blood per rectum  ID: [ ] Fever [ ] Chills [ ] Dysuria  ENT: [ ] Rhinorrhea        VITALS: [X ] Reviewed  Vital Signs Last 24 Hrs  T(C): 36.8 (26 May 2022 04:25), Max: 37 (25 May 2022 23:42)  T(F): 98.3 (26 May 2022 04:25), Max: 98.6 (25 May 2022 23:42)  HR: 68 (26 May 2022 06:00) (59 - 94)  BP: 109/55 (26 May 2022 06:00) (90/63 - 136/74)  BP(mean): 72 (26 May 2022 06:00) (60 - 103)  RR: 12 (26 May 2022 06:00) (12 - 20)  SpO2: 99% (26 May 2022 06:00) (98% - 100%)    MEDICATIONS  (STANDING):  chlorhexidine 2% Cloths 1 Application(s) Topical daily  dextrose 5%. 1000 milliLiter(s) (50 mL/Hr) IV Continuous <Continuous>  dextrose 50% Injectable 25 Gram(s) IV Push once  glucagon  Injectable 1 milliGRAM(s) IntraMuscular once  insulin lispro (ADMELOG) corrective regimen sliding scale   SubCutaneous every 6 hours  levETIRAcetam  IVPB 500 milliGRAM(s) po Intermittent every 12 hours  polyethylene glycol 3350 17 Gram(s) Oral daily  senna 2 Tablet(s) Oral at bedtime    MEDICATIONS  (PRN):  dextrose Oral Gel 15 Gram(s) Oral once PRN Blood Glucose LESS THAN 70 milliGRAM(s)/deciliter  hydrALAZINE Injectable 10 milliGRAM(s) IV Push every 2 hours PRN SBP > 140  labetalol Injectable 10 milliGRAM(s) IV Push every 2 hours PRN Systolic blood pressure > 140  ondansetron Injectable 4 milliGRAM(s) IV Push every 6 hours PRN Nausea and/or Vomiting  oxyCODONE    IR 5 milliGRAM(s) Oral every 6 hours PRN Moderate Pain (4 - 6)    I&O's Summary    25 May 2022 07:01  -  26 May 2022 06:58  --------------------------------------------------------  IN: 810 mL / OUT: 850 mL / NET: -40 mL        CAPILLARY BLOOD GLUCOSE      POCT Blood Glucose.: 80 mg/dL (25 May 2022 23:50)  POCT Blood Glucose.: 280 mg/dL (25 May 2022 18:12)        LABS:  PT/INR - ( 25 May 2022 11:16 )   PT: 13.0 sec;   INR: 1.12 ratio         PTT - ( 25 May 2022 11:16 )  PTT:27.6 sec  05-26    135  |  101  |  14.8  ----------------------------<  79  4.2   |  22.0  |  0.44<L>    Ca    8.5<L>      26 May 2022 02:39  Phos  3.2     05-26  Mg     1.9     05-26    TPro  6.2<L>  /  Alb  3.4  /  TBili  0.6  /  DBili  x   /  AST  202<H>  /  ALT  128<H>  /  AlkPhos  159<H>  05-26                          9.0  ( 9.7)  7.21  )-----------( 150      ( 26 May 2022 02:39 )             28.0      TTE Echo Complete w/ Contrast w/ Doppler (05.25.22 @ 17:51) >  Summary:   1. Left ventricular ejection fraction, by visual estimation, is 60 to   65%.   2. Normal global left ventricular systolic function.   3. Spectral Doppler shows impaired relaxation pattern of left   ventricular myocardial filling (Grade I diastolic dysfunction).   4. Mildly enlarged right ventricle with normal RV systolic function.   5. Estimated pulmonary artery systolic pressure is 39.7 mmHg assuming a   right atrial pressure of 3 mmHg, which is consistent with borderline   pulmonary hypertension.   6. Mildly enlarged left atrium.   7. Right atrial enlargement.   8. Mild mitral annular calcification.   9. Moderate mitral valve regurgitation.  10. Mild-moderate tricuspid regurgitation.  11. Mild aortic regurgitation.           CT Head No Cont (05.25.22 @ 22:22) >    COMPARISON STUDY: Head CT scans from 05/25/2022 at 3:13 PM and 8:54 AM    FINDINGS:  Approximately 2.7 x 2.2 x 3.2 cm intraparenchymal hematoma in the right   parietal lobe (2:32 and 5:34) is slightly increased in size from 2.4 x 2   x 3.2 cm on 05/22/2022 at 3:13 PM and 2.2 x 1.2 x 2 cm on 05/25/2022 at   8:54 AM.  Mild surrounding vasogenic edema and regional mass effect is   grossly stable from 05/25/2022 at 3:13 PM and slightly increased from   05/25/2022 at 8:54 AM.  Trace right parietal subarachnoid hemorrhage   adjacent to the into parenchymal hematoma is stable.  Trace subdural   hemorrhage layering along right cerebellar tentorium is stable.    There is mild generalized cerebral volume loss and mild periventricular   white matter hypoattenuation compatible chronic microvascular ischemic   disease.      The visualized paranasal sinuses and mastoids are grossly clear.    The patient is status post cataract lens placement surgery bilaterally.    The calvarium and skull base appear unremarkable.  There is a posterior   parietal-occipital scalp laceration with skin staples in place.    IMPRESSION:  An approximately 2.7 x 2.2 x 3.2 cm right parietal intraparenchymal   hemorrhage is slightly increased in size from 2.4 x 2 x 3.2 cm on   05/22/2022 at 3:13 PM and 2.2 x 1.2 x 2 cm and 05/25/2022 at 8:54 AM.    Trace right parietal subarachnoid hemorrhage and trace subdural   hemorrhage along the right cerebellar tentorium are stable.          EXAMINATION:  PHYSICAL EXAM:    Constitutional: No Acute Distress     Neurological: Awake, alert oriented to person, place and time, Following Commands, PERRL, EOMI, No Gaze Preference, Face Symmetrical, Speech Fluent, No dysmetria, No ataxia, No nystagmus     Motor exam:          Upper extremity                         Delt     Bicep     Tricep    HG                                                 R         5/5        5/5        5/5       5/5                                               L        Drift          Lower extremity                        HF         KF        KE       DF         PF                                                  R        5/5        5/5        5/5       5/5         5/5                                               L         5/5        5/5       5/5       5/5          5/5                                                 Sensation: [ X] intact to light touch        Pulmonary: Clear to Auscultation, No rales, No rhonchi, No wheezes     Cardiovascular: S1, S2, Regular rate and rhythm     Gastrointestinal: Soft, Non-tender, Non-distended     Extremities: No calf tenderness

## 2022-05-26 NOTE — OCCUPATIONAL THERAPY INITIAL EVALUATION ADULT - GENERAL OBSERVATIONS, REHAB EVAL
Received pt side-lying, +IV, +Tele/,  +primafit, in NAD, on RA. Pt with c/o headache pre and post eval, requesting pain meds, nurse made aware. Patient agreeable to OT evaluation

## 2022-05-26 NOTE — OCCUPATIONAL THERAPY INITIAL EVALUATION ADULT - COORDINATION ASSESSED, REHAB EVAL
continue to assess; bilateral UE impaired, left worse than right however may be due in part to pt visual impairments/finger to nose

## 2022-05-26 NOTE — OCCUPATIONAL THERAPY INITIAL EVALUATION ADULT - ADDITIONAL COMMENTS
Pt has a tub with curtains and grab bars. Pt owns a RW and tub transfer bench. Pt is right handed and drives. Pt states son and grandson live locally and could assist if needed. Pt states independent for IADLs except for has a cleaning lady and someone drop off and  her laundry

## 2022-05-26 NOTE — OCCUPATIONAL THERAPY INITIAL EVALUATION ADULT - RANGE OF MOTION EXAMINATION, UPPER EXTREMITY
except for bilateral shoulder flexion ~90 degrees/bilateral UE Active ROM was WFL  (within functional limits)

## 2022-05-26 NOTE — PHYSICAL THERAPY INITIAL EVALUATION ADULT - ADDITIONAL COMMENTS
Pt reports independent PTA, owns RW- does not use. Independent ADLs, uses shower chair. +, shops, cooks. Pt has cleaning lady and laundry service, son lives close by can assist if needed.

## 2022-05-26 NOTE — PROGRESS NOTE ADULT - ASSESSMENT
89F s/p mechanical fall [place] [facility] [place] Parietal IPH which has worsened in size since initial CTH. Patient is neurologically intact with only complaint being headache. Post bleed day 1.     Plan  Neuro:  Intraparenchymal Hemorrhage   - q1h neurochecks  - admit to NSICU   - CTH repeat at 9pm to assess for status of hemorrhage   - HOB 30 degrees  - maintain SBP < 140 to prevent increased hemorrhage  - Keppra 500 BID for seizure prophylaxis for 7-10d   - Full code   - No neurosurgical intervention warranted at this time   - pain control as needed for headache   - Maintain normonatremia and trend sodium   - Hold any antiplatelet or anticoagulation medications until cleared by neurosurgery   - PT / OT when hemorrhage is stable   	  CV:  	SBP Goal<140   	Hydralazine and labetalol PRNs in place               EKG and TTE ordered               Lipid profile ordered     Pulm:  	  	Saturating well on RA               Continuous SpO2 monitoring     GI:  	Nutrition: NPO pending dysphagia               Mild transaminitis; will trend LFTs   	  	  Gu:  	Voiding              LR @ 60 for hydration   	I&O Q1 hour  	Monitor Electrolytes & Renal Function    Heme:  	Monitor H&H              Iron studies ordered for anemia workup   	Chemical DVT prophylaxis:   		* Chemical DVT prophylaxis is contraindicated due to risk of bleeding  	Mechanical DVT Prophylaxis: Maintain B/L LE sequential compression devices  	  ID:  	Monitor WBC and Temperature  	  		    Endo    A1C and TSH ordered              Continue home dose of synthroid   	Monitor BGL, maintain <180  	MAURIZIO   		100-150 no correction  		150-200  2units  		200-250  4units  		250-300	 6units  		300-350  8units  		350-400  10units  		400+	12units      89F s/p mechanical fall presenting [place] [facility] [place]. Parietal IPH which has worsened in size since initial CTH. Patient is neurologically intact with only complaint being headache. Post bleed day 1.  L [place] Palsy      Plan  Neuro:  Intraparenchymal Hemorrhage - L Parietal   - q 2 h neuro checks  - pain control - Ox Ir    - CTH repeat at 9pm to assess for status of hemorrhage   - HOB 30 degrees  - MRI +/- MRA   - maintain SBP < 140 to prevent increased hemorrhage  - Keppra 500 BID for seizure prophylaxis for 7  - No neurosurgical intervention warranted at this time   - pain control as needed for headache   - Maintain normonatremic and trend sodium   - Hold any antiplatelet or anticoagulation medications until cleared by neurosurgery   - PT / OT when hemorrhage is stable  - OOB    	  CV:        HLD- LDL -  43   	SBP Goal<140   	Hydralazine and labetalol PRNs in place               EKG- stable  and TTE - as above              Hold statin      Pulm:  	 IS   	Maintain O2 Sat > 92 %                Continuous SpO2 monitoring     GI:         Reg diet / Transaminitis  - Hepatocellular               Hold acetaminophen for now               Mild transaminitis; will trend LFTs   	  	  Gu:  	Voiding               No velasco  	Monitor Electrolytes & Renal Function    Heme:   Anemia - Nl MCV               B12- nl ; Fe - 34,   	Monitor H&H              Iron studies- Fe def ACD   	Chemical DVT prophylaxis:   		* Chemical DVT prophylaxis is contraindicated due to risk of bleeding  	Mechanical DVT Prophylaxis: Maintain B/L LE sequential compression devices  	  ID:  	Monitor WBC and Temperature  	  		    Endo      Hypothyroid - hx ;               A1C - 5.3  TSH - 0.79               synthroid 50mcg q day               D/C FS      Full Code - Not DNR /DNI

## 2022-05-26 NOTE — CHART NOTE - NSCHARTNOTEFT_GEN_A_CORE
Hospital Course: 89[place] [facility] [place]. Imaging studies at [facility] revealed right intraparenchymal hemorrhage prompting transfer to [facility] for neurosurgical evaluation & management. On arrival patient with no acute complaints. States she tripped and fell, no loss of consciousness, reports she takes a baby aspirin daily. No other reported anticoagulation / antiplatelet use. Patient was admitted to the NSICU for clinical obseration. Repeat CT scans were notable for increased IPH which subsequently stablizied on last CT scan this morning 5/26. Patient's neurological exam on presentation was been normal with baseline left facial secondary to history of bell's palsy. There was a mild LUE drift noted intermittenly on exam. During morning rounds, patient was cleared for a transfer in level of care to stepdown on the neurosurgery service.     PAST MEDICAL & SURGICAL HISTORY:  Diabetes mellitus  Hyperlipidemia  Hypothyroidism  S/P cholecystectomy      Interval History:  24 Hour Events: Cortical hemorrhage has stabilized and patient's examination has been stable     1. Patient's Neurologic Exam unchanged.    2. Patient's Hemodynamics maintained without drips    3. Patient's Respiratory status is: adequate on RA     4. Patient is pending: Patient is pending further neurologic observation, dispo planning, possible metastatic workup depending on family conversation     5. Dispo: Downgrade to stepdown      Physical Exam:  Constitutional: resting comfortably in ICU bed     Neuro  * Mental Status:  GCS 15:  E(4), V(5), M(6).  Awake, alert, oriented to conversation, following commands and conversing appropriately   * Cranial Nerves:  PERRL, EOMI, tongue midline, no gaze deviation, L. facial droop mild   * Motor: RUE 5/5, LUE 5/5, RLE 5/5, LLE 5/5  * Sensory: Sensation intact to light touch  * Reflexes: Not assessed  * Gait: Not assessed    Vital Signs Last 24 Hrs  T(C): 36.6 (26 May 2022 07:50), Max: 37 (25 May 2022 23:42)  T(F): 97.9 (26 May 2022 07:50), Max: 98.6 (25 May 2022 23:42)  HR: 65 (26 May 2022 09:00) (59 - 94)  BP: 120/50 (26 May 2022 09:00) (90/63 - 136/74)  BP(mean): 69 (26 May 2022 09:00) (60 - 103)  RR: 15 (26 May 2022 09:00) (12 - 20)  SpO2: 98% (26 May 2022 09:00) (98% - 100%)        Labs                        9.0    7.21  )-----------( 150      ( 26 May 2022 02:39 )             28.0       05-26    135  |  101  |  14.8  ----------------------------<  79  4.2   |  22.0  |  0.44<L>    Ca    8.5<L>      26 May 2022 02:39  Phos  3.2     05-26  Mg     1.9     05-26    TPro  6.2<L>  /  Alb  3.4  /  TBili  0.6  /  DBili  x   /  AST  202<H>  /  ALT  128<H>  /  AlkPhos  159<H>  05-26      LIVER FUNCTIONS - ( 26 May 2022 02:39 )  Alb: 3.4 g/dL / Pro: 6.2 g/dL / ALK PHOS: 159 U/L / ALT: 128 U/L / AST: 202 U/L / GGT: x             PT/INR - ( 25 May 2022 11:16 )   PT: 13.0 sec;   INR: 1.12 ratio    PTT - ( 25 May 2022 11:16 )  PTT:27.6 sec      CAPILLARY BLOOD GLUCOSE  POCT Blood Glucose.: 80 mg/dL (25 May 2022 23:50)  POCT Blood Glucose.: 280 mg/dL (25 May 2022 18:12)      Neurosurgery Imaging: I attest that all recent neurosurgical imaging was personally reviewed  < from: CT Head No Cont (05.26.22 @ 06:32) >    ACC: [id number] EXAM:  CT BRAIN                          PROCEDURE DATE:  05/26/2022          INTERPRETATION:  Noncontrast CT of the brain.    CLINICAL INDICATION:  Right occipital parenchymal hemorrhage, follow-up    TECHNIQUE : Axial CT scanning ofthe brain was obtained from the skull   base to the vertex without the administration of intravenous contrast.   Sagittal and coronal reformats were provided.    COMPARISON: CT brain 5/25/2022 obtained at 10:19 PM    FINDINGS:    Similar 2.3 x 2.4 cmacute right occipital parenchymal hemorrhage with   surrounding edema and local mass effect.    No hydrocephalus, midline shift, or effacement of basal cisterns.    IMPRESSION:    No significant interval change from CT brain 5/25/2022 obtained at 10:19   PM    --- End of Report ---      RACHAEL OSCAR MD; Attending Radiologist  This document has been electronically signed. May 26 2022  9:31AM    < end of copied text >          Assessment: 89F s/p mechanical fall with hemorrhagic lesion in the R. parietal cortex concerning for neoplastic etiology, post bleed day 1 with stable hemorrhage and stable clinical examination. Conversation with patient and family pending regarding a metastatic workup.     Plan  - q2h neurochecks   - STAT CTH if change in neurological status; otherwise stability scan as per NSGY attending discretion   - Keppra 500 BID X 7-10d for seizure prophylaxis in the setting of cortical bleed   - Due to the suspicion of a neoplastic lesion being the nidus of the hemorrhage, conversation is pending with patient and family regarding the necessity of a workup and would surgery, chemo or radiation be considered if a cancerous lesion was noted. NSGY recommendations for neoplastic workup would include an MRI with and without contrast as well as an MRA. Depending on findings, CT chest, abdomen and pelvis wet and dry   - PT / OT / Mobilize  - Hold AC / AP meds until further notice   - Saturating well on RA  - Tolerating diet  - Transaminitis of unknown cause noted; trend CMPs daily to assess for worsening liver function; consider ammonia levels if encephalopathy occurs; avoid tylenol and exercise caution with medications notable for primary hepatic metabolism   - Mechanical dvt prophylaxis only until chemoprophylaxis cleared by NSGY attending   - Iron deficiency anemia; continue PO ferrous sulfate 325qd   - Downgrade to stepdown unit under NSGY as primary

## 2022-05-26 NOTE — PROGRESS NOTE ADULT - SUBJECTIVE AND OBJECTIVE BOX
HPI:  Patient is a 89 y/o female transfer from [facility] [place]/p trip and fall. Imaging studies at [facility] revealed right intraparenchymal hemorrhage prompting transfer to [facility] for neurosurgical evaluation & management. On arrival patient with no acute complaints. States she tripped and fell, no loss of consciousness, reports she takes a baby aspirin daily. No other reported anticoagulation / antiplatelet use. Airway: intact, c-collar in place on arrival. Breathing: breath sounds CTA b/l, no accessory muscle use, no conversational dyspnea. Circulation: 2+ central & peripheral pulses b/l. Disability: pupils 2mm round and reactive to light b/l, GCS15. Exposure: fully exposed and covered w/ warm blankets. CXR & pelvis XR performed without obvious acute traumatic findings. PIV placed by RN and labs drawn. istat pH 7.45, base excess +5, Hgb 9.9. (Hgb 10.3 on blood work from [facility] [place]).    Imaging studies from [facility] [place] reviewed with Dr. Conley. CT head showed R intraparenchymal hemorrhage. CT cervical spine without acute findings, degenerative changes noted. Neurosurgical consult placed @ 1045. After physical exam and pt without neuro deficits / distracting injuries, cervical collar cleared by resident MD Taylor.  (25 May 2022 10:47)      INTERVAL HPI/OVERNIGHT EVENTS:  Repeat CTH slightly larger IPH. Remains with nonfocal neurologic exam.     Vital Signs Last 24 Hrs  T(C): 37 (25 May 2022 23:42), Max: 37 (25 May 2022 23:42)  T(F): 98.6 (25 May 2022 23:42), Max: 98.6 (25 May 2022 23:42)  HR: 66 (26 May 2022 00:00) (60 - 94)  BP: 97/43 (26 May 2022 00:00) (92/56 - 136/74)  BP(mean): 60 (26 May 2022 00:00) (60 - 103)  RR: 17 (26 May 2022 00:00) (12 - 19)  SpO2: 100% (26 May 2022 00:00) (98% - 100%)    PHYSICAL EXAM:  GENERAL: NAD  HEAD:  Atraumatic, normocephalic   DEBBY COMA SCORE: E- V- M- =15       E: 4= opens eyes spontaneously        V: 5= oriented        M: 6= follows commands   MENTAL STATUS: AAO x3; Awake; Opens eyes spontaneously; Appropriately conversant without aphasia; following simple commands  CRANIAL NERVES: Visual acuity normal for age, visual fields full to confrontation, PERRL. EOMI without nystagmus. Facial sensation intact V1-3 distribution b/l. Face symmetric w/ normal eye closure and smile, tongue midline. Hearing grossly intact. Speech clear..   REFLEXES: PERRL.   MOTOR:   LUE 5/5 with slight drift, LLE 5/5, RUE/ RLE 5/5  COORDINATION: Gait not assessed; rapid alternating movements intact; heel to shin intact; no upper extremity dysmetria      LABS:                        9.7    9.84  )-----------( 173      ( 25 May 2022 11:16 )             30.1         134<L>  |  98  |  18.8  ----------------------------<  115<H>  4.5   |  24.0  |  0.55    Ca    8.6      25 May 2022 11:16    TPro  6.8  /  Alb  3.9  /  TBili  0.4  /  DBili  x   /  AST  45<H>  /  ALT  44<H>  /  AlkPhos  125<H>      PT/INR - ( 25 May 2022 11:16 )   PT: 13.0 sec;   INR: 1.12 ratio         PTT - ( 25 May 2022 11:16 )  PTT:27.6 sec  Urinalysis Basic - ( 25 May 2022 22:09 )    Color: Yellow / Appearance: Clear / S.015 / pH: x  Gluc: x / Ketone: Small  / Bili: Negative / Urobili: Negative mg/dL   Blood: x / Protein: Negative / Nitrite: Negative   Leuk Esterase: Negative / RBC: 0-2 /HPF / WBC Negative /HPF   Sq Epi: x / Non Sq Epi: Occasional / Bacteria: x         @ 07:01  -   @ 01:00  --------------------------------------------------------  IN: 510 mL / OUT: 650 mL / NET: -140 mL        RADIOLOGY & ADDITIONAL TESTS:  < from: CT Head No Cont (22 @ 22:22) >    ACC: [id number] EXAM:  CT BRAIN                          PROCEDURE DATE:  2022          INTERPRETATION:  CLINICAL INFORMATION: Follow-up intraparenchymal   hemorrhage.    TECHNIQUE:  Axial CT images were acquired through the head.  Intravenous contrast: None  Two-dimensional reformats were generated.    COMPARISON STUDY: Head CT scans from 2022 at 3:13 PM and 8:54 AM    FINDINGS:  Approximately 2.7 x 2.2 x 3.2 cm intraparenchymal hematoma in the right   parietal lobe (2:32 and 5:34) is slightly increased in size from 2.4 x 2   x 3.2 cm on 2022 at 3:13 PM and 2.2 x 1.2 x 2 cm on 2022 at   8:54 AM.  Mild surrounding vasogenic edema and regional mass effect is   grossly stable from 2022 at 3:13 PM and slightly increased from   2022 at 8:54 AM.  Trace right parietal subarachnoid hemorrhage   adjacent to the into parenchymal hematoma is stable.  Trace subdural   hemorrhage layering along right cerebellar tentorium is stable.    There is mild generalized cerebral volume loss and mild periventricular   white matter hypoattenuation compatible chronic microvascular ischemic   disease.      The visualized paranasal sinuses and mastoids are grossly clear.    The patient is status post cataract lens placement surgery bilaterally.    The calvarium and skull base appear unremarkable.  There is a posterior   parietal-occipital scalp laceration with skin staples in place.    IMPRESSION:  An approximately 2.7 x 2.2 x 3.2 cm right parietal intraparenchymal   hemorrhage is slightly increased in size from 2.4 x 2 x 3.2 cm on   2022 at 3:13 PM and 2.2 x 1.2 x 2 cm and 2022 at 8:54 AM.    Trace right parietal subarachnoid hemorrhage and trace subdural   hemorrhage along the right cerebellar tentorium are stable.    --- End of Report ---            MITALI ELAM MD; Attending Radiologist  This document has been electronically signed. May 25 2022 10:57PM    < end of copied text >

## 2022-05-26 NOTE — OCCUPATIONAL THERAPY INITIAL EVALUATION ADULT - DIAGNOSIS, OT EVAL
89 [place] [facility] [place]. Imaging studies at [place] revealed right parietal intraparenchymal hemorrhage worsened in size since initial CTH prompting transfer to [facility] for neurosurgical evaluation & management.

## 2022-05-26 NOTE — OCCUPATIONAL THERAPY INITIAL EVALUATION ADULT - VISUAL ACUITY
+reading glasses; no complaints in vision offered. Pt unable to correctly identify # of digits held in central or peripheral fields bilaterally. Pt would benefit from neuro opthalmology consult

## 2022-05-27 LAB
ALLERGY+IMMUNOLOGY DIAG STUDY NOTE: SIGNIFICANT CHANGE UP
ANION GAP SERPL CALC-SCNC: 10 MMOL/L — SIGNIFICANT CHANGE UP (ref 5–17)
ANION GAP SERPL CALC-SCNC: 15 MMOL/L — SIGNIFICANT CHANGE UP (ref 5–17)
BUN SERPL-MCNC: 13.9 MG/DL — SIGNIFICANT CHANGE UP (ref 8–20)
BUN SERPL-MCNC: 14.7 MG/DL — SIGNIFICANT CHANGE UP (ref 8–20)
CALCIUM SERPL-MCNC: 8.3 MG/DL — LOW (ref 8.6–10.2)
CALCIUM SERPL-MCNC: 8.6 MG/DL — SIGNIFICANT CHANGE UP (ref 8.6–10.2)
CHLORIDE SERPL-SCNC: 96 MMOL/L — LOW (ref 98–107)
CHLORIDE SERPL-SCNC: 98 MMOL/L — SIGNIFICANT CHANGE UP (ref 98–107)
CO2 SERPL-SCNC: 20 MMOL/L — LOW (ref 22–29)
CO2 SERPL-SCNC: 23 MMOL/L — SIGNIFICANT CHANGE UP (ref 22–29)
CREAT SERPL-MCNC: 0.48 MG/DL — LOW (ref 0.5–1.3)
CREAT SERPL-MCNC: 0.53 MG/DL — SIGNIFICANT CHANGE UP (ref 0.5–1.3)
EGFR: 85 ML/MIN/1.73M2 — SIGNIFICANT CHANGE UP
EGFR: 87 ML/MIN/1.73M2 — SIGNIFICANT CHANGE UP
GLUCOSE SERPL-MCNC: 108 MG/DL — HIGH (ref 70–99)
GLUCOSE SERPL-MCNC: 145 MG/DL — HIGH (ref 70–99)
HCT VFR BLD CALC: 26.3 % — LOW (ref 34.5–45)
HGB BLD-MCNC: 8.3 G/DL — LOW (ref 11.5–15.5)
MAGNESIUM SERPL-MCNC: 1.9 MG/DL — SIGNIFICANT CHANGE UP (ref 1.6–2.6)
MCHC RBC-ENTMCNC: 29.9 PG — SIGNIFICANT CHANGE UP (ref 27–34)
MCHC RBC-ENTMCNC: 31.6 GM/DL — LOW (ref 32–36)
MCV RBC AUTO: 94.6 FL — SIGNIFICANT CHANGE UP (ref 80–100)
PHOSPHATE SERPL-MCNC: 2.6 MG/DL — SIGNIFICANT CHANGE UP (ref 2.4–4.7)
PLATELET # BLD AUTO: 157 K/UL — SIGNIFICANT CHANGE UP (ref 150–400)
POTASSIUM SERPL-MCNC: 4.3 MMOL/L — SIGNIFICANT CHANGE UP (ref 3.5–5.3)
POTASSIUM SERPL-MCNC: 4.9 MMOL/L — SIGNIFICANT CHANGE UP (ref 3.5–5.3)
POTASSIUM SERPL-SCNC: 4.3 MMOL/L — SIGNIFICANT CHANGE UP (ref 3.5–5.3)
POTASSIUM SERPL-SCNC: 4.9 MMOL/L — SIGNIFICANT CHANGE UP (ref 3.5–5.3)
RBC # BLD: 2.78 M/UL — LOW (ref 3.8–5.2)
RBC # FLD: 15 % — HIGH (ref 10.3–14.5)
SODIUM SERPL-SCNC: 131 MMOL/L — LOW (ref 135–145)
SODIUM SERPL-SCNC: 131 MMOL/L — LOW (ref 135–145)
WBC # BLD: 9.11 K/UL — SIGNIFICANT CHANGE UP (ref 3.8–10.5)
WBC # FLD AUTO: 9.11 K/UL — SIGNIFICANT CHANGE UP (ref 3.8–10.5)

## 2022-05-27 PROCEDURE — 99223 1ST HOSP IP/OBS HIGH 75: CPT

## 2022-05-27 RX ADMIN — Medication 500 MILLIGRAM(S): at 11:08

## 2022-05-27 RX ADMIN — Medication 50 MICROGRAM(S): at 04:53

## 2022-05-27 RX ADMIN — OXYCODONE HYDROCHLORIDE 5 MILLIGRAM(S): 5 TABLET ORAL at 11:07

## 2022-05-27 RX ADMIN — CHLORHEXIDINE GLUCONATE 1 APPLICATION(S): 213 SOLUTION TOPICAL at 11:09

## 2022-05-27 RX ADMIN — SENNA PLUS 2 TABLET(S): 8.6 TABLET ORAL at 21:33

## 2022-05-27 RX ADMIN — Medication 1 TABLET(S): at 11:08

## 2022-05-27 RX ADMIN — OXYCODONE HYDROCHLORIDE 5 MILLIGRAM(S): 5 TABLET ORAL at 04:53

## 2022-05-27 RX ADMIN — OXYCODONE HYDROCHLORIDE 5 MILLIGRAM(S): 5 TABLET ORAL at 22:33

## 2022-05-27 RX ADMIN — LEVETIRACETAM 500 MILLIGRAM(S): 250 TABLET, FILM COATED ORAL at 17:26

## 2022-05-27 RX ADMIN — LEVETIRACETAM 500 MILLIGRAM(S): 250 TABLET, FILM COATED ORAL at 04:53

## 2022-05-27 RX ADMIN — OXYCODONE HYDROCHLORIDE 5 MILLIGRAM(S): 5 TABLET ORAL at 05:38

## 2022-05-27 RX ADMIN — OXYCODONE HYDROCHLORIDE 5 MILLIGRAM(S): 5 TABLET ORAL at 12:00

## 2022-05-27 RX ADMIN — POLYETHYLENE GLYCOL 3350 17 GRAM(S): 17 POWDER, FOR SOLUTION ORAL at 11:07

## 2022-05-27 RX ADMIN — Medication 325 MILLIGRAM(S): at 11:08

## 2022-05-27 RX ADMIN — OXYCODONE HYDROCHLORIDE 5 MILLIGRAM(S): 5 TABLET ORAL at 21:33

## 2022-05-27 NOTE — DIETITIAN INITIAL EVALUATION ADULT - OTHER INFO
Patient is a 89 [place]o [place] [facility] [place]. Imaging studies at [facility] revealed right intraparenchymal hemorrhage prompting transfer to [facility] for neurosurgical evaluation & management. On arrival patient with no acute complaints. States she tripped and fell, no loss of consciousness, reports she takes a baby aspirin daily. No other reported anticoagulation / antiplatelet use. Airway: intact, c-collar in place on arrival. Breathing: breath sounds CTA b/l, no accessory muscle use, no conversational dyspnea. Circulation: 2+ central & peripheral pulses b/l.   Pt admitted with R. Parietal IPH which has worsened in size since initial CTH. Patient is neurologically intact with only complaint being headache. Post bleed day 1.

## 2022-05-27 NOTE — PROGRESS NOTE ADULT - SUBJECTIVE AND OBJECTIVE BOX
INTERVAL HPI/OVERNIGHT EVENTS:  89y Female PMH DM, HLD, hypothyroidism, [place] [facility] [place] without LOC, found with right occipital IPH. Patient seen earlier this AM, c/o posterior head pain by staple placement. No other complaints.    Vital Signs Last 24 Hrs  T(C): 36.7 (27 May 2022 07:25), Max: 37.8 (26 May 2022 20:00)  T(F): 98.1 (27 May 2022 07:25), Max: 100 (26 May 2022 20:00)  HR: 73 (27 May 2022 08:00) (69 - 84)  BP: 111/45 (27 May 2022 08:00) (90/78 - 145/55)  BP(mean): 62 (27 May 2022 08:00) (57 - 81)  RR: 19 (27 May 2022 08:00) (14 - 19)  SpO2: 95% (27 May 2022 08:00) (95% - 100%)    PHYSICAL EXAM:  GENERAL: NAD, well-groomed  HEAD: posterior parieto-occipital trauma- staples in place, no active hemorrhage  DEBBY COMA SCORE: E- 4 V- 5 M- 6=15  MENTAL STATUS: AAO x3; Appropriately conversant without aphasia; following commands  CRANIAL NERVES: PERRL. EOMI without nystagmus. Facial sensation intact V1-3 distribution b/l. chronic left facial droop from bell's palsy. tongue midline. Hearing grossly intact. Speech clear  MOTOR: strength 5/5 b/l upper and lower extremities  SENSATION: grossly intact to light touch all extremities    LABS:                        8.3    9.11  )-----------( 157      ( 27 May 2022 07:07 )             26.3     05-27    131<L>  |  98  |  13.9  ----------------------------<  108<H>  4.3   |  23.0  |  0.53    Ca    8.6      27 May 2022 07:07  Phos  2.6     05-  Mg     1.9     -27    TPro  6.2<L>  /  Alb  3.4  /  TBili  0.6  /  DBili  x   /  AST  202<H>  /  ALT  128<H>  /  AlkPhos  159<H>      Urinalysis Basic - ( 25 May 2022 22:09 )    Color: Yellow / Appearance: Clear / S.015 / pH: x  Gluc: x / Ketone: Small  / Bili: Negative / Urobili: Negative mg/dL   Blood: x / Protein: Negative / Nitrite: Negative   Leuk Esterase: Negative / RBC: 0-2 /HPF / WBC Negative /HPF   Sq Epi: x / Non Sq Epi: Occasional / Bacteria: x     @ 07:01  -   @ 07:00  --------------------------------------------------------  IN: 100 mL / OUT: 450 mL / NET: -350 mL    RADIOLOGY & ADDITIONAL TESTS:  CT Head No Cont (22 @ 06:32)  IMPRESSION:  No significant interval change from CT brain 2022 obtained at 10:19 PM    CT Head No Cont (22 @ 22:22)  IMPRESSION:  An approximately 2.7 x 2.2 x 3.2 cm right parietal intraparenchymal   hemorrhage is slightly increased in size from 2.4 x 2 x 3.2 cm on   2022 at 3:13 PM and 2.2 x 1.2 x 2 cm and 2022 at 8:54 AM.  Trace right parietal subarachnoid hemorrhage and trace subdural   hemorrhage along the right cerebellar tentorium are stable.

## 2022-05-27 NOTE — DIETITIAN INITIAL EVALUATION ADULT - PERTINENT MEDS FT
MEDICATIONS  (STANDING):  ascorbic acid 500 milliGRAM(s) Oral daily  chlorhexidine 2% Cloths 1 Application(s) Topical daily  ferrous    sulfate 325 milliGRAM(s) Oral daily  levETIRAcetam 500 milliGRAM(s) Oral two times a day  levothyroxine 50 MICROGram(s) Oral daily  multivitamin 1 Tablet(s) Oral daily  polyethylene glycol 3350 17 Gram(s) Oral daily  senna 2 Tablet(s) Oral at bedtime    MEDICATIONS  (PRN):  hydrALAZINE Injectable 10 milliGRAM(s) IV Push every 2 hours PRN SBP > 140  labetalol Injectable 10 milliGRAM(s) IV Push every 2 hours PRN Systolic blood pressure > 140  ondansetron Injectable 4 milliGRAM(s) IV Push every 6 hours PRN Nausea and/or Vomiting  oxyCODONE    IR 5 milliGRAM(s) Oral every 6 hours PRN Moderate Pain (4 - 6)

## 2022-05-27 NOTE — DIETITIAN NUTRITION RISK NOTIFICATION - TREATMENT: THE FOLLOWING DIET HAS BEEN RECOMMENDED
Diet, Consistent Carbohydrate w/Evening Snack:   Supplement Feeding Modality:  Oral  Ensure Enlive Cans or Servings Per Day:  1       Frequency:  Three Times a day (05-26-22 @ 11:48) [Active]

## 2022-05-27 NOTE — DIETITIAN INITIAL EVALUATION ADULT - NS FNS DIET ORDER
Diet, Consistent Carbohydrate w/Evening Snack:   Supplement Feeding Modality:  Oral  Ensure Enlive Cans or Servings Per Day:  1       Frequency:  Three Times a day (05-26-22 @ 11:48)

## 2022-05-27 NOTE — DIETITIAN INITIAL EVALUATION ADULT - ORAL INTAKE PTA/DIET HISTORY
Pt reports having fair PO intake at home, reports eating small meals. Pt reports weight loss over the past few years; however unable to quantify. Ensure Enlive unopened at bedside; encouraged pt to sip between meals. Pt agreeable to trying.

## 2022-05-27 NOTE — DIETITIAN NUTRITION RISK NOTIFICATION - OTHER RISK FACTORS
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 CFLS introduced self. Family familiar with CFL services from previous clinic appointments. CFLS discussed with father using toy dinosaur to measure first and then pt. Pt was very cooperative role modeling height on \"dinosaur\" first and then himself.    Procedure Support Comment Declined LMX; Pt typically tabitha very well with lab draws. Coping plan  included sitting on father's lap,another staff member to assist with holding arm stil and using the ipad(Isai Tigsindy) as a distraction/coping tool. Pt calm and engaged in the ipad. Pt viewed procedure but no signs of anxiety/fear displayed. Pt coped extremely well.    Family Support Comment Father accompanied pt during his clinic appointment. Pt has a twin sibling.Father is a support/comfort especially during procedures.   Concerns About Development   (Pt's chart noted for developmental delay. short stature; easily engaged with writer)   Anxiety Appropriate;Low Anxiety  (with support)   Techniques to [place] with Loss/Stress/Change diversional activity;family presence   Able to Shift Focus From Anxiety Easy   Outcomes/Follow Up Continue to Follow/Support     "
Not applicable

## 2022-05-27 NOTE — DIETITIAN INITIAL EVALUATION ADULT - ETIOLOGY
Related to inability to meet sufficient protein energy requirements in setting of advanced age admitted with worsening R. Parietal IPH

## 2022-05-27 NOTE — PROGRESS NOTE ADULT - ASSESSMENT
89y Female PMH DM, HLD, hypothyroidism, [place] to [facility] [place] and fall without LOC, found with right occipital IPH.  - Exam stable    PLAN:  - D/w Dr. Ang  - Q4 neuro checks, ok to downgrade to telemetry  - Continue Keppra 500 Q12 x 7 days  - MR brain w/wo contrast and MR angiogram without contrast pending to rule out underlying lesion  - Pain control PRN- Oxycodone 5 (avoiding tylenol due to transaminitis)  - SBP goal 100-140- PRN labetalol/hydralazine; has not required  - SEnna/miralax to prevent constipation; pending BM on admission, currently only hospital day 2  - Tolerating CCB diet. VitC/MVI/Ferrous sulfate supplementation. Zofran PRN for nausea  - Continue home synthroid 50 mcg QD  - PT/OT/PMR- AR when workup complete

## 2022-05-27 NOTE — CONSULT NOTE ADULT - SUBJECTIVE AND OBJECTIVE BOX
89yF was admitted on  from [facility] after a mechanical fall. Imaging showed a right intraparenchymal hemorrhage     Imaging performed:  HEAD CT  - An approximately 2.7 x 2.2 x 3.2 cm right parietal intraparenchymal hemorrhage is slightly increased in size from 2.4 x 2 x 3.2 cm on 2022 at 3:13 PM and 2.2 x 1.2 x 2 cm and 2022 at 8:54 AM. Trace right parietal subarachnoid hemorrhage and trace subdural hemorrhage along the right cerebellar tentorium are stable.    HEAD CT  - No significant interval change from CT brain 2022 obtained at 10:19 PM    Patient is having a posterior headache where she hit.  Applied new ice bags.  Otherwise feels tired.     REVIEW OF SYSTEMS  Constitutional - No fever, No weight loss, +fatigue  HEENT - No eye pain, No visual disturbances, No difficulty hearing, No tinnitus, No vertigo, +neck pain  Respiratory - No cough, No wheezing, No shortness of breath  Cardiovascular - No chest pain, No palpitations  Gastrointestinal - No abdominal pain, No nausea, No vomiting, No diarrhea, No constipation  Genitourinary - No dysuria, No frequency, No hematuria, No incontinence  Neurological - +headaches, No memory loss, +loss of strength, No numbness, No tremors  Skin - No itching, No rashes, No lesions   Endocrine - No temperature intolerance  Musculoskeletal - +joint pain, No joint swelling, +muscle pain  Psychiatric - No depression, No anxiety    VITALS  T(C): 36.7 (22 @ 07:25), Max: 37.8 (22 @ 20:00)  HR: 73 (22 @ 08:00) (69 - 85)  BP: 111/45 (22 @ 08:00) (90/78 - 145/55)  RR: 19 (22 @ 08:00) (14 - 20)  SpO2: 95% (22 @ 08:00) (94% - 100%)  Wt(kg): --    PAST MEDICAL & SURGICAL HISTORY  Diabetes mellitus    Hyperlipidemia    Hypothyroidism    S/P cholecystectomy        FUNCTIONAL HISTORY  Lives alone, 0 RAYA  Independent, Has RW/SC at home    CURRENT FUNCTIONAL STATUS   PT  Bed Mobility: Rolling/Turning:     · Level of [place]	independent    Bed Mobility: Sit to Supine:     · Level of [place]	minimum assist (75% patients effort); bilateral LEs  · Physical Assist/Nonphysical Assist	1 person assist; verbal cues    Bed Mobility: Supine to Sit:     · Level of [place]	moderate assist (50% patients effort)  · Physical Assist/Nonphysical Assist	1 person assist; verbal cues    Transfer: Bed to Chair:     Transfer Skill: Bed to Chair   · Level of [place]	unable to perform; RN request return to bed    Transfer: Chair to Bed:     · Level of [place]	unable to perform    Transfer: Sit to Stand:     · Level of [place]	minimum assist (75% patients effort)  · Physical Assist/Nonphysical Assist	1 person assist; verbal cues  · Weight-Bearing Restrictions	full weight-bearing  · Assistive Device	rolling walker    Transfer: Stand to Sit:     · Level of [place]	minimum assist (75% patients effort)  · Physical Assist/Nonphysical Assist	1 person assist; verbal cues  · Weight-Bearing Restrictions	full weight-bearing  · Assistive Device	rolling walker    Gait Skills:     · Level of [place]	minimum assist (75% patients effort)  · Physical Assist/Nonphysical Assist	verbal cues; 1 person assist  · Weight-Bearing Restrictions	full weight-bearing  · Assistive Device	rolling walker  · Gait Distance	10 feet; distance limited by lines at this time    Gait Analysis:     · Gait Pattern Used	2-point gait  · Gait Deviations Noted	decreased rajiv; decreased step length  · Impairments Contributing to Gait Deviations	impaired balance; impaired coordination; decreased flexibility; impaired motor control; decreased ROM; decreased strength    Stair Negotiation:     · Level of [place]	unable to perform; Pt does not negotiate stairs at home     OT  Bathing Training:     · Level of [place]	moderate assist (50% patients effort); to sponge bathe  · Physical Assist/Nonphysical Assist	1 person assist; verbal cues; set-up required    Upper Body Dressing Training:     · Level of [place]	minimum assist (75% patients effort); seated to don/doff gown  · Physical Assist/Nonphysical Assist	1 person assist; verbal cues; set-up required    Lower Body Dressing Training:     · Level of [place]	moderate assist (50% patients effort); to don/doff socks  · Physical Assist/Nonphysical Assist	1 person assist; verbal cues; set-up required    Toilet Hygiene Training:     · Level of [place]	+Primafit at time of eval    Grooming Training:     · Level of [place]	minimum assist (75% patients effort); seated  · Physical Assist/Nonphysical Assist	1 person assist; verbal cues; set-up required    Eating/Self-Feeding Training:     · Level of [place]	Did not directly observe      SOCIAL HISTORY - as per documentation/history  Smoking - None  EtOH - None  Drugs - None    FAMILY HISTORY   No pertinent family history in first degree relatives        RECENT LABS - Reviewed  CBC Full  -  ( 27 May 2022 07:07 )  WBC Count : 9.11 K/uL  RBC Count : 2.78 M/uL  Hemoglobin : 8.3 g/dL  Hematocrit : 26.3 %  Platelet Count - Automated : 157 K/uL  Mean Cell Volume : 94.6 fl  Mean Cell Hemoglobin : 29.9 pg  Mean Cell Hemoglobin Concentration : 31.6 gm/dL  Auto Neutrophil # : x  Auto Lymphocyte # : x  Auto Monocyte # : x  Auto Eosinophil # : x  Auto Basophil # : x  Auto Neutrophil % : x  Auto Lymphocyte % : x  Auto Monocyte % : x  Auto Eosinophil % : x  Auto Basophil % : x    05-27    131<L>  |  98  |  13.9  ----------------------------<  108<H>  4.3   |  23.0  |  0.53    Ca    8.6      27 May 2022 07:07  Phos  2.6     05-27  Mg     1.9     05-27    TPro  6.2<L>  /  Alb  3.4  /  TBili  0.6  /  DBili  x   /  AST  202<H>  /  ALT  128<H>  /  AlkPhos  159<H>  05-26    Urinalysis Basic - ( 25 May 2022 22:09 )    Color: Yellow / Appearance: Clear / S.015 / pH: x  Gluc: x / Ketone: Small  / Bili: Negative / Urobili: Negative mg/dL   Blood: x / Protein: Negative / Nitrite: Negative   Leuk Esterase: Negative / RBC: 0-2 /HPF / WBC Negative /HPF   Sq Epi: x / Non Sq Epi: Occasional / Bacteria: x        ALLERGIES  Celebrex (Unknown)  iodine (Unknown)      MEDICATIONS   ascorbic acid 500 milliGRAM(s) Oral daily  chlorhexidine 2% Cloths 1 Application(s) Topical daily  ferrous    sulfate 325 milliGRAM(s) Oral daily  hydrALAZINE Injectable 10 milliGRAM(s) IV Push every 2 hours PRN  labetalol Injectable 10 milliGRAM(s) IV Push every 2 hours PRN  levETIRAcetam 500 milliGRAM(s) Oral two times a day  levothyroxine 50 MICROGram(s) Oral daily  multivitamin 1 Tablet(s) Oral daily  ondansetron Injectable 4 milliGRAM(s) IV Push every 6 hours PRN  oxyCODONE    IR 5 milliGRAM(s) Oral every 6 hours PRN  polyethylene glycol 3350 17 Gram(s) Oral daily  senna 2 Tablet(s) Oral at bedtime      ----------------------------------------------------------------------------------------  PHYSICAL EXAM  Constitutional - NAD, Uncomfortable  HEENT - Posterior hematoma, +Staples   Neck - Supple, +limited ROM due to neck/head pain  Chest - Breathing comfortably, No wheezing  Cardiovascular - S1S2   Abdomen - Soft   Extremities - No C/C/E, No calf tenderness   Neurologic Exam -                    Cognitive - AAO to self, place, date, year, situation     Communication - Fluent, No dysarthria     Cranial Nerves - Left peripheral droop - mild     Motor - No focal deficits - overall 4/5 due to fatigue and pain     Sensory - Intact to LT  Psychiatric - Mood stable, Calm   ----------------------------------------------------------------------------------------  ASSESSMENT/PLAN  89yFemale with functional deficits after a mechanical fall sustaining an IPH  Right parietal IPH - Afshan, MRA/MRI planned (R/O Amyloid vs Lesion)  HTN - Hydralazine, Labetalol  Pain - Tylenol, Oxycodone  DVT PPX - SCDs  Rehab - Patient pending additional workup. When medically optimized, will need AR.     Will continue to follow. Rehab recommendations are dependent on how functional progress changes as well as how patient continues to participate and tolerate therapeutic interventions, which may change. Recommend ongoing mobilization by staff to maintain cardiopulmonary function and prevention of secondary complications related to debility. Discussed with rehab team.

## 2022-05-28 LAB
ALBUMIN SERPL ELPH-MCNC: 3.1 G/DL — LOW (ref 3.3–5.2)
ALBUMIN SERPL ELPH-MCNC: 3.3 G/DL — SIGNIFICANT CHANGE UP (ref 3.3–5.2)
ALP SERPL-CCNC: 125 U/L — HIGH (ref 40–120)
ALP SERPL-CCNC: 99 U/L — SIGNIFICANT CHANGE UP (ref 40–120)
ALT FLD-CCNC: 35 U/L — HIGH
ALT FLD-CCNC: 46 U/L — HIGH
ANION GAP SERPL CALC-SCNC: 10 MMOL/L — SIGNIFICANT CHANGE UP (ref 5–17)
ANION GAP SERPL CALC-SCNC: 13 MMOL/L — SIGNIFICANT CHANGE UP (ref 5–17)
APTT BLD: 27.3 SEC — LOW (ref 27.5–35.5)
AST SERPL-CCNC: 21 U/L — SIGNIFICANT CHANGE UP
AST SERPL-CCNC: 28 U/L — SIGNIFICANT CHANGE UP
BASOPHILS # BLD AUTO: 0.04 K/UL — SIGNIFICANT CHANGE UP (ref 0–0.2)
BASOPHILS # BLD AUTO: 0.04 K/UL — SIGNIFICANT CHANGE UP (ref 0–0.2)
BASOPHILS NFR BLD AUTO: 0.3 % — SIGNIFICANT CHANGE UP (ref 0–2)
BASOPHILS NFR BLD AUTO: 0.4 % — SIGNIFICANT CHANGE UP (ref 0–2)
BILIRUB SERPL-MCNC: 0.6 MG/DL — SIGNIFICANT CHANGE UP (ref 0.4–2)
BILIRUB SERPL-MCNC: 0.7 MG/DL — SIGNIFICANT CHANGE UP (ref 0.4–2)
BUN SERPL-MCNC: 16.5 MG/DL — SIGNIFICANT CHANGE UP (ref 8–20)
BUN SERPL-MCNC: 16.9 MG/DL — SIGNIFICANT CHANGE UP (ref 8–20)
CALCIUM SERPL-MCNC: 8.4 MG/DL — LOW (ref 8.6–10.2)
CALCIUM SERPL-MCNC: 8.5 MG/DL — LOW (ref 8.6–10.2)
CHLORIDE SERPL-SCNC: 97 MMOL/L — LOW (ref 98–107)
CHLORIDE SERPL-SCNC: 98 MMOL/L — SIGNIFICANT CHANGE UP (ref 98–107)
CO2 SERPL-SCNC: 24 MMOL/L — SIGNIFICANT CHANGE UP (ref 22–29)
CO2 SERPL-SCNC: 25 MMOL/L — SIGNIFICANT CHANGE UP (ref 22–29)
CREAT SERPL-MCNC: 0.5 MG/DL — SIGNIFICANT CHANGE UP (ref 0.5–1.3)
CREAT SERPL-MCNC: 0.61 MG/DL — SIGNIFICANT CHANGE UP (ref 0.5–1.3)
EGFR: 82 ML/MIN/1.73M2 — SIGNIFICANT CHANGE UP
EGFR: 86 ML/MIN/1.73M2 — SIGNIFICANT CHANGE UP
EOSINOPHIL # BLD AUTO: 0.03 K/UL — SIGNIFICANT CHANGE UP (ref 0–0.5)
EOSINOPHIL # BLD AUTO: 0.03 K/UL — SIGNIFICANT CHANGE UP (ref 0–0.5)
EOSINOPHIL NFR BLD AUTO: 0.2 % — SIGNIFICANT CHANGE UP (ref 0–6)
EOSINOPHIL NFR BLD AUTO: 0.3 % — SIGNIFICANT CHANGE UP (ref 0–6)
GLUCOSE BLDC GLUCOMTR-MCNC: 208 MG/DL — HIGH (ref 70–99)
GLUCOSE SERPL-MCNC: 162 MG/DL — HIGH (ref 70–99)
GLUCOSE SERPL-MCNC: 164 MG/DL — HIGH (ref 70–99)
HCT VFR BLD CALC: 24.7 % — LOW (ref 34.5–45)
HCT VFR BLD CALC: 29.3 % — LOW (ref 34.5–45)
HGB BLD-MCNC: 8.1 G/DL — LOW (ref 11.5–15.5)
HGB BLD-MCNC: 9.2 G/DL — LOW (ref 11.5–15.5)
IMM GRANULOCYTES NFR BLD AUTO: 0.5 % — SIGNIFICANT CHANGE UP (ref 0–1.5)
IMM GRANULOCYTES NFR BLD AUTO: 0.9 % — SIGNIFICANT CHANGE UP (ref 0–1.5)
INR BLD: 1.29 RATIO — HIGH (ref 0.88–1.16)
LACTATE SERPL-SCNC: 0.9 MMOL/L — SIGNIFICANT CHANGE UP (ref 0.5–2)
LYMPHOCYTES # BLD AUTO: 0.61 K/UL — LOW (ref 1–3.3)
LYMPHOCYTES # BLD AUTO: 0.75 K/UL — LOW (ref 1–3.3)
LYMPHOCYTES # BLD AUTO: 5.6 % — LOW (ref 13–44)
LYMPHOCYTES # BLD AUTO: 5.9 % — LOW (ref 13–44)
MAGNESIUM SERPL-MCNC: 1.7 MG/DL — LOW (ref 1.8–2.6)
MAGNESIUM SERPL-MCNC: 1.8 MG/DL — SIGNIFICANT CHANGE UP (ref 1.8–2.6)
MCHC RBC-ENTMCNC: 30.2 PG — SIGNIFICANT CHANGE UP (ref 27–34)
MCHC RBC-ENTMCNC: 30.6 PG — SIGNIFICANT CHANGE UP (ref 27–34)
MCHC RBC-ENTMCNC: 31.4 GM/DL — LOW (ref 32–36)
MCHC RBC-ENTMCNC: 32.8 GM/DL — SIGNIFICANT CHANGE UP (ref 32–36)
MCV RBC AUTO: 93.2 FL — SIGNIFICANT CHANGE UP (ref 80–100)
MCV RBC AUTO: 96.1 FL — SIGNIFICANT CHANGE UP (ref 80–100)
MONOCYTES # BLD AUTO: 0.99 K/UL — HIGH (ref 0–0.9)
MONOCYTES # BLD AUTO: 1.35 K/UL — HIGH (ref 0–0.9)
MONOCYTES NFR BLD AUTO: 10.6 % — SIGNIFICANT CHANGE UP (ref 2–14)
MONOCYTES NFR BLD AUTO: 9 % — SIGNIFICANT CHANGE UP (ref 2–14)
NEUTROPHILS # BLD AUTO: 10.49 K/UL — HIGH (ref 1.8–7.4)
NEUTROPHILS # BLD AUTO: 9.22 K/UL — HIGH (ref 1.8–7.4)
NEUTROPHILS NFR BLD AUTO: 82.1 % — HIGH (ref 43–77)
NEUTROPHILS NFR BLD AUTO: 84.2 % — HIGH (ref 43–77)
PHOSPHATE SERPL-MCNC: 2.7 MG/DL — SIGNIFICANT CHANGE UP (ref 2.4–4.7)
PHOSPHATE SERPL-MCNC: 2.8 MG/DL — SIGNIFICANT CHANGE UP (ref 2.4–4.7)
PLATELET # BLD AUTO: 147 K/UL — LOW (ref 150–400)
PLATELET # BLD AUTO: 153 K/UL — SIGNIFICANT CHANGE UP (ref 150–400)
POTASSIUM SERPL-MCNC: 3.9 MMOL/L — SIGNIFICANT CHANGE UP (ref 3.5–5.3)
POTASSIUM SERPL-MCNC: 4.2 MMOL/L — SIGNIFICANT CHANGE UP (ref 3.5–5.3)
POTASSIUM SERPL-SCNC: 3.9 MMOL/L — SIGNIFICANT CHANGE UP (ref 3.5–5.3)
POTASSIUM SERPL-SCNC: 4.2 MMOL/L — SIGNIFICANT CHANGE UP (ref 3.5–5.3)
PROCALCITONIN SERPL-MCNC: 0.14 NG/ML — HIGH (ref 0.02–0.1)
PROT SERPL-MCNC: 5.9 G/DL — LOW (ref 6.6–8.7)
PROT SERPL-MCNC: 6.3 G/DL — LOW (ref 6.6–8.7)
PROTHROM AB SERPL-ACNC: 15 SEC — HIGH (ref 10.5–13.4)
RBC # BLD: 2.65 M/UL — LOW (ref 3.8–5.2)
RBC # BLD: 3.05 M/UL — LOW (ref 3.8–5.2)
RBC # FLD: 15.2 % — HIGH (ref 10.3–14.5)
RBC # FLD: 15.2 % — HIGH (ref 10.3–14.5)
SODIUM SERPL-SCNC: 132 MMOL/L — LOW (ref 135–145)
SODIUM SERPL-SCNC: 134 MMOL/L — LOW (ref 135–145)
WBC # BLD: 10.95 K/UL — HIGH (ref 3.8–10.5)
WBC # BLD: 12.78 K/UL — HIGH (ref 3.8–10.5)
WBC # FLD AUTO: 10.95 K/UL — HIGH (ref 3.8–10.5)
WBC # FLD AUTO: 12.78 K/UL — HIGH (ref 3.8–10.5)

## 2022-05-28 PROCEDURE — 99291 CRITICAL CARE FIRST HOUR: CPT

## 2022-05-28 PROCEDURE — 70450 CT HEAD/BRAIN W/O DYE: CPT | Mod: 26

## 2022-05-28 PROCEDURE — 71045 X-RAY EXAM CHEST 1 VIEW: CPT | Mod: 26

## 2022-05-28 RX ORDER — LACOSAMIDE 50 MG/1
100 TABLET ORAL
Refills: 0 | Status: DISCONTINUED | OUTPATIENT
Start: 2022-05-28 | End: 2022-06-04

## 2022-05-28 RX ORDER — SODIUM CHLORIDE 9 MG/ML
500 INJECTION INTRAMUSCULAR; INTRAVENOUS; SUBCUTANEOUS ONCE
Refills: 0 | Status: COMPLETED | OUTPATIENT
Start: 2022-05-28 | End: 2022-05-28

## 2022-05-28 RX ORDER — ACETAMINOPHEN 500 MG
650 TABLET ORAL EVERY 6 HOURS
Refills: 0 | Status: DISCONTINUED | OUTPATIENT
Start: 2022-05-28 | End: 2022-06-06

## 2022-05-28 RX ORDER — MAGNESIUM SULFATE 500 MG/ML
2 VIAL (ML) INJECTION ONCE
Refills: 0 | Status: COMPLETED | OUTPATIENT
Start: 2022-05-28 | End: 2022-05-28

## 2022-05-28 RX ORDER — LACOSAMIDE 50 MG/1
200 TABLET ORAL ONCE
Refills: 0 | Status: DISCONTINUED | OUTPATIENT
Start: 2022-05-28 | End: 2022-05-28

## 2022-05-28 RX ADMIN — CHLORHEXIDINE GLUCONATE 1 APPLICATION(S): 213 SOLUTION TOPICAL at 14:44

## 2022-05-28 RX ADMIN — SODIUM CHLORIDE 500 MILLILITER(S): 9 INJECTION INTRAMUSCULAR; INTRAVENOUS; SUBCUTANEOUS at 19:43

## 2022-05-28 RX ADMIN — Medication 25 GRAM(S): at 22:09

## 2022-05-28 RX ADMIN — Medication 500 MILLIGRAM(S): at 14:44

## 2022-05-28 RX ADMIN — LACOSAMIDE 140 MILLIGRAM(S): 50 TABLET ORAL at 20:13

## 2022-05-28 RX ADMIN — Medication 325 MILLIGRAM(S): at 12:27

## 2022-05-28 RX ADMIN — OXYCODONE HYDROCHLORIDE 5 MILLIGRAM(S): 5 TABLET ORAL at 12:27

## 2022-05-28 RX ADMIN — POLYETHYLENE GLYCOL 3350 17 GRAM(S): 17 POWDER, FOR SOLUTION ORAL at 12:27

## 2022-05-28 RX ADMIN — Medication 62.5 MILLIMOLE(S): at 22:10

## 2022-05-28 RX ADMIN — LEVETIRACETAM 500 MILLIGRAM(S): 250 TABLET, FILM COATED ORAL at 05:30

## 2022-05-28 RX ADMIN — OXYCODONE HYDROCHLORIDE 5 MILLIGRAM(S): 5 TABLET ORAL at 13:30

## 2022-05-28 RX ADMIN — Medication 1 TABLET(S): at 12:27

## 2022-05-28 RX ADMIN — Medication 50 MICROGRAM(S): at 05:30

## 2022-05-28 NOTE — CHART NOTE - NSCHARTNOTEFT_GEN_A_CORE
[facility]                                        [facility] [place]                                 Shanta Sosa, & Marcello                                  [address]                                        [place], [place], [address]                                             [phone]        Responded to code stroke call.   Spoke with neurosurgery PA covering patient.  The patient is a 89 year old woman admitted 5/25 after trip/fall with intracranial hemorrhage in the right posterior parietal-occipital region.  Downgraded from ICU 5/26.  Today at ~5:15 she was found altered.   Initially urgent CT requested but neurosurgery was told there would be delay secondary to other studies in queue. Stroke code called to expedite.   Now with aphasia and weakness on both sides.     Patient is not a candidate for t-PA as she has intracranial hemorrhage.  Unlikely to be a neuro-intervention candidate and therefore no CT-A requested.    Awaiting CT head (patient being sent now).

## 2022-05-28 NOTE — PROGRESS NOTE ADULT - SUBJECTIVE AND OBJECTIVE BOX
HPI:  Patient is a 89 y/o female transfer from [facility] s/p trip and fall. Imaging studies at [place] revealed right intraparenchymal hemorrhage prompting transfer to [facility] for neurosurgical evaluation & management. On arrival patient with no acute complaints. States she tripped and fell, no loss of consciousness, reports she takes a baby aspirin daily. No other reported anticoagulation / antiplatelet use. Airway: intact, c-collar in place on arrival. Breathing: breath sounds CTA b/l, no accessory muscle use, no conversational dyspnea. Circulation: 2+ central & peripheral pulses b/l. Disability: pupils 2mm round and reactive to light b/l, GCS15. Exposure: fully exposed and covered w/ warm blankets. CXR & pelvis XR performed without obvious acute traumatic findings. PIV placed by RN and labs drawn. istat pH 7.45, base excess +5, Hgb 9.9. (Hgb 10.3 on blood work from [facility]).    Imaging studies from [facility] reviewed with Dr. Conley. CT head showed R intraparenchymal hemorrhage. CT cervical spine without acute findings, degenerative changes noted. Neurosurgical consult placed @ 1045. After physical exam and pt without neuro deficits / distracting injuries, cervical collar cleared by resident MD Taylor.  (25 May 2022 10:47)        INTERVAL HPI/OVERNIGHT EVENTS:  Pt seen and evaluated this morning. Pt admits to HA, denies N/V, dizziness, palpitations.       Vital Signs Last 24 Hrs  T(C): 36.4 (28 May 2022 18:00), Max: 38.5 (28 May 2022 16:21)  T(F): 97.6 (28 May 2022 18:00), Max: 101.3 (28 May 2022 16:21)  HR: 88 (28 May 2022 18:00) (82 - 88)  BP: 100/56 (28 May 2022 18:00) (100/56 - 120/65)  BP(mean): --  RR: 17 (28 May 2022 18:00) (16 - 18)  SpO2: 97% (28 May 2022 16:42) (94% - 97%)      PHYSICAL EXAM:  GENERAL: NAD, well-groomed  HEAD:  Atraumatic, normocephalic  MENTAL STATUS: AAO x3; Awake; Opens eyes spontaneously; Appropriately conversant without aphasia; following simple commands  CRANIAL NERVES: MATTHEW; EOMI; corneals intact b/l; Dolls sign positive; blinks to threat b/l; no facial asymmetry; facial sensation grossly intact to light touch b/l;  tongue midline  MOTOR: strength 5/5 B/L upper and lower extremities; sensation grossly intact all extremities  CHEST/LUNG: +breath sounds bilaterally; no rales, rhonchi, wheezing, appreciated  ABDOMEN: Soft, nontender, nondistended; bowel sounds present   EXTREMITIES: no clubbing, cyanosis  SKIN: Warm, dry      LABS:                        9.2    10.95 )-----------( 147      ( 28 May 2022 05:23 )             29.3     05-28    134<L>  |  98  |  16.5  ----------------------------<  162<H>  4.2   |  24.0  |  0.50    Ca    8.5<L>      28 May 2022 05:23  Phos  2.7     05-28  Mg     1.8     05-28    TPro  6.3<L>  /  Alb  3.3  /  TBili  0.6  /  DBili  x   /  AST  28  /  ALT  46<H>  /  AlkPhos  125<H>  05-28 05-27 @ 07:01  -  05-28 @ 07:00  --------------------------------------------------------  IN: 0 mL / OUT: 250 mL / NET: -250 mL      RADIOLOGY & ADDITIONAL TESTS:    ACC: [id number] EXAM:  CT BRAIN                        PROCEDURE DATE:  05/25/2022    IMPRESSION:  Increased size of acute right mesial parietal parenchymal hemorrhage,   currently 2.1 x 2.2 cm, previously 1.4 x 1.5 cm. Increased surrounding   vasogenic edema and local mass effect.      ACC: [id number] EXAM:  CT BRAIN                        PROCEDURE DATE:  05/26/2022    IMPRESSION:  No significant interval change from CT brain 5/25/2022 obtained at 10:19   PM

## 2022-05-28 NOTE — PROGRESS NOTE ADULT - SUBJECTIVE AND OBJECTIVE BOX
HPI: Patient is a 89 y/o female [place] [facility] [place]. Imaging studies at [place] revealed right intraparenchymal hemorrhage prompting transfer to [facility] for neurosurgical evaluation & management. On arrival patient with no acute complaints. States she tripped and fell, no loss of consciousness, reports she takes a baby aspirin daily. No other reported anticoagulation / antiplatelet use. Airway: intact, c-collar in place on arrival. Breathing: breath sounds CTA b/l, no accessory muscle use, no conversational dyspnea. Circulation: 2+ central & peripheral pulses b/l. Disability: pupils 2mm round and reactive to light b/l, GCS15. Exposure: fully exposed and covered w/ warm blankets. CXR & pelvis XR performed without obvious acute traumatic findings. PIV placed by RN and labs drawn. istat pH 7.45, base excess +5, Hgb 9.9. (Hgb 10.3 on blood work from [facility] Imaging studies [place] [facility] [place] with Dr. Conley. CT head showed R intraparenchymal hemorrhage. CT cervical spine without acute findings, degenerative changes noted. Neurosurgical consult placed @ 1045. After physical exam and pt without neuro deficits / distracting injuries, cervical collar cleared by resident MD Taylor.  (25 May 2022 10:47)    Interval Events: Pt had been admitted to the ICU on 5/25 and downgrade on 5/26 after remaining stable with HCT. SDU/Floor course uneventful through 5/27.  24 Hr Events: Noted to have acute onset dysarthria, exp aphasia on floor. Code stroke called. CTH stable and upgraded to Neuro ICU. Exam slowly improving. Added Vimpat with initial 200 mg load, Keppra stopped. Labs unremarkable. Collateral history obtained from daughter-in-law, per family, pt had slowly been declining neurologically over past few days with intermittent fevers.     Physical Exam:  Constitutional: NAD  Neuro  * Mental Status:  Awake, alert, Ox2-3 (interm getting place correct), able to speak in short sentences, FC  Cranial Nerves: Cnii-Cnxii grossly intact. PERRL, EOMI, tongue midline, rt gaze preference however able to overcome   * Motor: RUE 4/5, LUE 4/5, RLE 4/5, LLE 4/5  * Sensory: Sensation intact to light touch  * Reflexes: Not assessed  * Gait: Not assessed    Vitals:  T(C): 36.7 (29 May 2022 04:34), Max: 38.5 (28 May 2022 16:21)  T(F): 98.1 (29 May 2022 04:34), Max: 101.3 (28 May 2022 16:21)  HR: 82 (29 May 2022 03:00) (69 - 92)  BP: 109/48 (29 May 2022 03:00) (95/46 - 120/65)  BP(mean): 66 (29 May 2022 03:00) (62 - 95)  RR: 17 (29 May 2022 03:00) (15 - 24)  SpO2: 99% (29 May 2022 03:00) (92% - 99%)    I&O:  27 May 2022 07:01  -  28 May 2022 07:00  --------------------------------------------------------  IN: 0 mL / OUT: 250 mL / NET: -250 mL    28 May 2022 07:01  -  29 May 2022 05:35  --------------------------------------------------------  IN: 349.6 mL / OUT: 600 mL / NET: -250.4 mL    Labs:             8.4    12.22 )-----------( 152      ( 29 May 2022 04:11 )             26.0     130<L>  |  95<L>  |  16.4  ----------------------------<  170<H>  4.4   |  23.0  |  0.51    Ca    8.5<L>      29 May 2022 04:11  Phos  3.9     05-29  Mg     2.3     05-29    TPro  5.9<L>  /  Alb  3.1<L>  /  TBili  0.7  /  DBili  x   /  AST  21  /  ALT  35<H>  /  AlkPhos  99  05-28  Alb: 3.1 g/dL / Pro: 5.9 g/dL / ALK PHOS: 99 U/L / ALT: 35 U/L / AST: 21 U/L / GGT: x           PT/INR - ( 28 May 2022 21:45 )   PT: 15.0 sec;   INR: 1.29 ratio    PTT - ( 28 May 2022 21:45 )  PTT:27.3 sec    Medications:  STANDING:  ascorbic acid 500 milliGRAM(s) Oral daily  chlorhexidine 2% Cloths 1 Application(s) Topical daily  ferrous    sulfate 325 milliGRAM(s) Oral daily  lacosamide 100 milliGRAM(s) Oral two times a day  levothyroxine 50 MICROGram(s) Oral daily  multivitamin 1 Tablet(s) Oral daily  polyethylene glycol 3350 17 Gram(s) Oral daily  senna 2 Tablet(s) Oral at bedtime    PRN:  acetaminophen     Tablet .. 650 milliGRAM(s) Oral every 6 hours PRN Temp greater or equal to 38C (100.4F), Mild Pain (1 - 3)  hydrALAZINE Injectable 10 milliGRAM(s) IV Push every 2 hours PRN SBP > 140  labetalol Injectable 10 milliGRAM(s) IV Push every 2 hours PRN Systolic blood pressure > 140  ondansetron Injectable 4 milliGRAM(s) IV Push every 6 hours PRN Nausea and/or Vomiting  oxyCODONE    IR 5 milliGRAM(s) Oral every 6 hours PRN Moderate Pain (4 - 6)

## 2022-05-28 NOTE — PROGRESS NOTE ADULT - ASSESSMENT
89y Female PMH DM, HLD, hypothyroidism, presented to [facility] s/p trip and fall without LOC, found with right occipital IPH.  - Exam stable    PLAN:  - D/w Dr. Hernandez  - Q4 neuro checks, ok to downgrade to telemetry  - Continue Keppra 500 Q12 x 7 days  - MR brain w/wo contrast and MR angiogram without contrast pending to rule out underlying lesion  - Pain control PRN- Oxycodone 5 (avoiding tylenol due to transaminitis)  - SBP goal 100-140- PRN labetalol/hydralazine; has not required  - SEnna/miralax to prevent constipation; pending BM on admission, currently only hospital day 2  - Tolerating CCB diet. VitC/MVI/Ferrous sulfate supplementation. Zofran PRN for nausea  - Continue home synthroid 50 mcg QD          Called to bedside today to evaluate pt who was noted to have a change in exam as per nurse. Pt examined, noted to be dysarthric, expressive aphasia, intermittent receptive aphasia, antigravity B/L UE and B/L UE, intermittently following exam, answering questions "yes," "no," and stating her name. Code stroke called. Discussed changes w/ Dr. Saldaña and Dr. Hernandez.  NIH 13    1A: Level of consciousness       0= Alert; keenly responsive       +1= Arouses to minor stimulation       +2= Requires repeated stimulation to arouse       +2= Movements to pain       +3= Postures or unresponsive  1B: Ask month and age       0= Both questions right       +1= 1 question right       +2= 0 questions right       +1= Dysarthric/intubated/trauma/language barrier       +2= Aphasic  1C: "Blink eyes" and "Squeeze Hands"       0= Performs both       +1= Performs 1 task       +2= Performs 0 tasks    2: Horizontal EOMs       0= Normal       +1= Partial gaze palsy: can be overcome       +1= Partial gaze palsy: corrects w/ oculocephalic reflex        +2= Forzed gaze palsy: cannot be overcome    3: Visual fields       0= No visual loss       +1= Partial hemianopia       +2= Complete hemianopia       +3= Patient is b/l blind       +3= B/l hemianopia    4: Facial palsy (use grimace if obtunded)       0= Normal symmetry       +1= Minor paralysis (flat NLF, smile asymmetry)       +2= Partial paralysis ( lower face)       +3= Unilateral complete paralysis (upper/lower face)       +3= B/l complete paralysis (upper/lower face)    5A: Left arm motor drift (count out loud and use fingers to show count)       0= No drift x 10 seconds       +1= Drift but doesn't hit bed       +2= Drift, hits bed       +2= Some effort against gravity       +3= No effort against gravity       +4= No movement       0= Amputation/joint fusion  5B: Right arm motor drift       0= No drift x 10 seconds       +1= Drift but doesn't hit bed       +2= Drift, hits bed       +2= Some effort against gravity       +3= No effort against gravity       +4= No movement       0= Amputation/joint fusion    6A: Left leg motor drift       0= No drift x 10 seconds       +1= Drift but doesn't hit bed       +2= Drift, hits bed       +2= Some effort against gravity       +3= No effort against gravity       +4= No movement       0= Amputation/joint fusion    6B: Right leg motor drift       0= No drift x 10 seconds       +1= Drift but doesn't hit bed       +2= Drift, hits bed       +2= Some effort against gravity       +3= No effort against gravity       +4= No movement       0= Amputation/joint fusion    7: Limb ataxia (FNF/heel-shin)       0= No ataxia       +1= Ataxia in 1 limb       +2= Ataxia in 2 limbs       0= Does not understand       0= Paralyzed       0= Amputation/joint fusion    8: Sensation       0= Normal, no sensory loss       +1= mild-moderate loss: less sharp/more dull       +1= mild-moderate loss: can sense being touched       +2= Complete loss: cannot sense being touched at all       +2= No response and quadriplegic       +2= Coma/unresponsive    9: Language/aphasia- describe the scene (on mikey); name the items; read the sentences (on mikey)       0= Normal, no aphasia       +1= mild-moderate aphaisa: some obvious changes without significant limitation       +2= Severe aphasia: fragmentary expression, inference needed, cannot identify materials       +3= Mute/global aphasia: no usable speech/auditory comprehension       +3= coma/unresponsive    10: Dysarthria- read the words       0= Normal       +1= mild-moderate dysarthria: slurring but can be understood       +2= Severe dysarthria: unintelligible slurring or out of proportion to dysphasia       +2= Mute/anarthric        0= Intubated/unable to test    11: Extinction/inattention       0= No abnormality       +1= visual/tactile/auditory/spatial/personal inattention       +1= Extinction to b/l simultaneous stimulation       +2= Profound anthony-inattention (e.g. does not recognize own hand)       +2= extinction to > 1 modality    -CT ordered and performed  -CBC, CMP, Mag, Phos ordered  -Discussed w/ neurology. Pt not tPA candidate secondary to CT Brain 5/25/22 read as showing acute right mesial parietal parenchymal hemorrhage.   -Pt upgraded to NSICU for further management

## 2022-05-28 NOTE — PROVIDER CONTACT NOTE (CHANGE IN STATUS NOTIFICATION) - BACKGROUND
Admitted on 5/25 s/p fall wound with staples at the back of her head  was oriented and answering appropriately when last seen at 16:42 pt now with one word when asked

## 2022-05-28 NOTE — PROGRESS NOTE ADULT - ASSESSMENT
89 year old female with DM, HLD, Hypothyroidism adm on 5/25 with right Occipital IPH now returns to the ICU s/p code stroke for exp aphasia.    Plan:  Neuro:  - Q1 neuro checks, readm to Neuro ICU  - S/P 200 Vimpat, Continue 100 BID. Stop Keppra  - EEG - P  - MRI/A when able  - Pain control PRN, avoid oversedation    Cardio:  - -140  - PRN Hydral/labetalol    Resp:  - RA    GI:  - Regular diet  - Bowel reg  - Vit C/MVI    :  - Voiding  - s/p 500 cc bolus     Heme:  - DVT PPx: SCDS, discuss SQL  - Cont ferrous sulfate    ID:  - Currently Afeb  - UA pending, will completed cultures is spikes fever  - Monitor off ABx, trend WBCs    Endo:  - Con Synthroid    Case discussed with Dr. Echavarria

## 2022-05-29 LAB
ANION GAP SERPL CALC-SCNC: 12 MMOL/L — SIGNIFICANT CHANGE UP (ref 5–17)
ANION GAP SERPL CALC-SCNC: 13 MMOL/L — SIGNIFICANT CHANGE UP (ref 5–17)
APPEARANCE UR: CLEAR — SIGNIFICANT CHANGE UP
BILIRUB UR-MCNC: NEGATIVE — SIGNIFICANT CHANGE UP
BUN SERPL-MCNC: 16.4 MG/DL — SIGNIFICANT CHANGE UP (ref 8–20)
BUN SERPL-MCNC: 18.9 MG/DL — SIGNIFICANT CHANGE UP (ref 8–20)
CALCIUM SERPL-MCNC: 7.7 MG/DL — LOW (ref 8.6–10.2)
CALCIUM SERPL-MCNC: 8.5 MG/DL — LOW (ref 8.6–10.2)
CHLORIDE SERPL-SCNC: 95 MMOL/L — LOW (ref 98–107)
CHLORIDE SERPL-SCNC: 95 MMOL/L — LOW (ref 98–107)
CO2 SERPL-SCNC: 23 MMOL/L — SIGNIFICANT CHANGE UP (ref 22–29)
CO2 SERPL-SCNC: 23 MMOL/L — SIGNIFICANT CHANGE UP (ref 22–29)
COLOR SPEC: YELLOW — SIGNIFICANT CHANGE UP
CREAT ?TM UR-MCNC: 96 MG/DL — SIGNIFICANT CHANGE UP
CREAT SERPL-MCNC: 0.49 MG/DL — LOW (ref 0.5–1.3)
CREAT SERPL-MCNC: 0.51 MG/DL — SIGNIFICANT CHANGE UP (ref 0.5–1.3)
DIFF PNL FLD: ABNORMAL
EGFR: 85 ML/MIN/1.73M2 — SIGNIFICANT CHANGE UP
EGFR: 86 ML/MIN/1.73M2 — SIGNIFICANT CHANGE UP
EPI CELLS # UR: SIGNIFICANT CHANGE UP
GLUCOSE SERPL-MCNC: 170 MG/DL — HIGH (ref 70–99)
GLUCOSE SERPL-MCNC: 234 MG/DL — HIGH (ref 70–99)
GLUCOSE UR QL: 50 MG/DL
HCT VFR BLD CALC: 26 % — LOW (ref 34.5–45)
HGB BLD-MCNC: 8.4 G/DL — LOW (ref 11.5–15.5)
KETONES UR-MCNC: ABNORMAL
LEUKOCYTE ESTERASE UR-ACNC: ABNORMAL
MAGNESIUM SERPL-MCNC: 2.3 MG/DL — SIGNIFICANT CHANGE UP (ref 1.8–2.6)
MCHC RBC-ENTMCNC: 29.9 PG — SIGNIFICANT CHANGE UP (ref 27–34)
MCHC RBC-ENTMCNC: 32.3 GM/DL — SIGNIFICANT CHANGE UP (ref 32–36)
MCV RBC AUTO: 92.5 FL — SIGNIFICANT CHANGE UP (ref 80–100)
NITRITE UR-MCNC: NEGATIVE — SIGNIFICANT CHANGE UP
OSMOLALITY UR: 660 MOSM/KG — SIGNIFICANT CHANGE UP (ref 300–1000)
PH UR: 5 — SIGNIFICANT CHANGE UP (ref 5–8)
PHOSPHATE SERPL-MCNC: 3.9 MG/DL — SIGNIFICANT CHANGE UP (ref 2.4–4.7)
PLATELET # BLD AUTO: 152 K/UL — SIGNIFICANT CHANGE UP (ref 150–400)
POTASSIUM SERPL-MCNC: 4.4 MMOL/L — SIGNIFICANT CHANGE UP (ref 3.5–5.3)
POTASSIUM SERPL-MCNC: 4.5 MMOL/L — SIGNIFICANT CHANGE UP (ref 3.5–5.3)
POTASSIUM SERPL-SCNC: 4.4 MMOL/L — SIGNIFICANT CHANGE UP (ref 3.5–5.3)
POTASSIUM SERPL-SCNC: 4.5 MMOL/L — SIGNIFICANT CHANGE UP (ref 3.5–5.3)
PROT UR-MCNC: NEGATIVE — SIGNIFICANT CHANGE UP
RBC # BLD: 2.81 M/UL — LOW (ref 3.8–5.2)
RBC # FLD: 15.2 % — HIGH (ref 10.3–14.5)
RBC CASTS # UR COMP ASSIST: SIGNIFICANT CHANGE UP /HPF (ref 0–4)
SODIUM SERPL-SCNC: 130 MMOL/L — LOW (ref 135–145)
SODIUM SERPL-SCNC: 131 MMOL/L — LOW (ref 135–145)
SODIUM UR-SCNC: 61 MMOL/L — SIGNIFICANT CHANGE UP
SP GR SPEC: 1.02 — SIGNIFICANT CHANGE UP (ref 1.01–1.02)
UROBILINOGEN FLD QL: NEGATIVE MG/DL — SIGNIFICANT CHANGE UP
WBC # BLD: 12.22 K/UL — HIGH (ref 3.8–10.5)
WBC # FLD AUTO: 12.22 K/UL — HIGH (ref 3.8–10.5)
WBC UR QL: SIGNIFICANT CHANGE UP /HPF (ref 0–5)

## 2022-05-29 PROCEDURE — 99291 CRITICAL CARE FIRST HOUR: CPT

## 2022-05-29 PROCEDURE — 95720 EEG PHY/QHP EA INCR W/VEEG: CPT

## 2022-05-29 PROCEDURE — 99223 1ST HOSP IP/OBS HIGH 75: CPT

## 2022-05-29 PROCEDURE — 73620 X-RAY EXAM OF FOOT: CPT | Mod: 26,50

## 2022-05-29 RX ORDER — SODIUM CHLORIDE 9 MG/ML
500 INJECTION INTRAMUSCULAR; INTRAVENOUS; SUBCUTANEOUS ONCE
Refills: 0 | Status: COMPLETED | OUTPATIENT
Start: 2022-05-29 | End: 2022-05-29

## 2022-05-29 RX ADMIN — POLYETHYLENE GLYCOL 3350 17 GRAM(S): 17 POWDER, FOR SOLUTION ORAL at 13:06

## 2022-05-29 RX ADMIN — Medication 500 MILLIGRAM(S): at 13:06

## 2022-05-29 RX ADMIN — SENNA PLUS 2 TABLET(S): 8.6 TABLET ORAL at 21:45

## 2022-05-29 RX ADMIN — LACOSAMIDE 100 MILLIGRAM(S): 50 TABLET ORAL at 18:20

## 2022-05-29 RX ADMIN — Medication 50 MICROGRAM(S): at 05:06

## 2022-05-29 RX ADMIN — Medication 650 MILLIGRAM(S): at 21:45

## 2022-05-29 RX ADMIN — Medication 325 MILLIGRAM(S): at 13:05

## 2022-05-29 RX ADMIN — Medication 1 TABLET(S): at 13:06

## 2022-05-29 RX ADMIN — LACOSAMIDE 100 MILLIGRAM(S): 50 TABLET ORAL at 05:04

## 2022-05-29 RX ADMIN — Medication 650 MILLIGRAM(S): at 22:43

## 2022-05-29 RX ADMIN — CHLORHEXIDINE GLUCONATE 1 APPLICATION(S): 213 SOLUTION TOPICAL at 13:14

## 2022-05-29 RX ADMIN — SODIUM CHLORIDE 250 MILLILITER(S): 9 INJECTION INTRAMUSCULAR; INTRAVENOUS; SUBCUTANEOUS at 13:05

## 2022-05-29 NOTE — PROGRESS NOTE ADULT - ASSESSMENT
89 year old female with DM, HLD, Hypothyroidism adm on 5/25 with right Occipital IPH S/P Fall now returns to the ICU s/p code stroke for exp aphasia.    Plan:  Neuro:  - Q1 neuro checks  - S/P 200 Vimpat, Continue 100 BID.  - EEG - P  - MRI/A when able  - Pain control PRN, avoid oversedation    Cardio:  - -140  - PRN Hydral/labetalol    Resp:  - RA  - Maintain O2 sats > 92 %    GI:  - Regular diet  - Bowel reg  - Vit C/MVI    :  - Voiding  - s/p 500 cc bolus     Heme: Fe def anemia   - DVT PPx: SCDS, discuss SQL  - Cont ferrous sulfate    ID:  S/P Fever   - Currently Afeb  - UA neg , check Blood cultuyres x2 if fever   - Monitor off ABx, trend WBCs  - Check procalcitonin    Endo: Hypothyroid   - Cont Synthroid    Goals of care discussion with patient son Kirk Red and he acknowledges his mom"s request is that she would not want cardiac resusciation or intubation if cardiac or pulmonary arrest/distress.   89 year old female with DM, HLD, Hypothyroidism adm on 5/25 with right Occipital IPH S/P Fall now returns to the ICU s/p code stroke for exp aphasia.  Possible seizure - with stutering exam over the last 2 days ; improvement    Plan:  Neuro:  - Q4 neuro checks  - S/P 200 Vimpat, Continue 100 BID.  - EEG - P  - MRI/A when able  - Pain control PRN, avoid oversedation    Cardio:  - -140  - PRN Hydral/labetalol    Resp:  - RA  - Maintain O2 sats > 92 %    GI:  - Regular diet  - Bowel reg  - Vit C/MVI    :  - Voiding      Heme: Fe def anemia   - DVT PPx: SCDS, discuss SQL  - Cont ferrous sulfate    ID:  S/P Fever   - Currently Afeb  - UA neg , check Blood cultuyres x2 if fever >/38.3  - Monitor off ABx, trend WBCs  - procalcitonin- .14    Endo: Hypothyroid ; Hyponatremia  TSH - 0.79  - Cont Synthroid  - Repaet Na if decreased NaCl tabs + free H20 restriction     Goals of care discussion with patient son Kirk Red and he acknowledges his mom"s request is that she would not want cardiac resusciation or intubation if cardiac or pulmonary arrest/distress.

## 2022-05-29 NOTE — PROGRESS NOTE ADULT - SUBJECTIVE AND OBJECTIVE BOX
HPI: Patient is a 89 y/o female transfer from [facility] s/p trip and fall. Imaging studies at [place] revealed right intraparenchymal hemorrhage prompting transfer to [facility] for neurosurgical evaluation & management. On arrival patient with no acute complaints. States she tripped and fell, no loss of consciousness, reports she takes a baby aspirin daily. No other reported anticoagulation / antiplatelet use. Airway: intact, c-collar in place on arrival. Breathing: breath sounds CTA b/l, no accessory muscle use, no conversational dyspnea. Circulation: 2+ central & peripheral pulses b/l. Disability: pupils 2mm round and reactive to light b/l, GCS15. Exposure: fully exposed and covered w/ warm blankets. CXR & pelvis XR performed without obvious acute traumatic findings. PIV placed by RN and labs drawn. istat pH 7.45, base excess +5, Hgb 9.9. (Hgb 10.3 on blood work from [facility]). Imaging studies from [facility] reviewed with Dr. Conley. CT head showed R intraparenchymal hemorrhage. CT cervical spine without acute findings, degenerative changes noted. Neurosurgical consult placed @ 1045. After physical exam and pt without neuro deficits / distracting injuries, cervical collar cleared by resident MD Taylor.  (25 May 2022 10:47)    Interval Events: Pt had been admitted to the ICU on 5/25 and downgrade on 5/26 after remaining stable with HCT. SDU/Floor course uneventful through 5/27.  24 Hr Events: Noted to have acute onset dysarthria, exp aphasia on floor. Code stroke called. CTH stable and upgraded to Neuro ICU. Exam slowly improving. Added Vimpat with initial 200 mg load, Keppra stopped. Labs unremarkable. Collateral history obtained from daughter-in-law, per family, pt had slowly been declining neurologically over past few days with intermittent fevers.     Physical Exam:  Constitutional: NAD  Neuro  * Mental Status:  Awake, alert, Ox2-3 (interm getting place correct), able to speak in short sentences, FC  Cranial Nerves: Cnii-Cnxii grossly intact. PERRL, EOMI, tongue midline, rt gaze preference however able to overcome   * Motor: RUE 4/5, LUE 4/5, RLE 4/5, LLE 4/5  * Sensory: Sensation intact to light touch  * Reflexes: Not assessed  * Gait: Not assessed    Vitals:  T(C): 36.7 (29 May 2022 04:34), Max: 38.5 (28 May 2022 16:21)  T(F): 98.1 (29 May 2022 04:34), Max: 101.3 (28 May 2022 16:21)  HR: 82 (29 May 2022 03:00) (69 - 92)  BP: 109/48 (29 May 2022 03:00) (95/46 - 120/65)  BP(mean): 66 (29 May 2022 03:00) (62 - 95)  RR: 17 (29 May 2022 03:00) (15 - 24)  SpO2: 99% (29 May 2022 03:00) (92% - 99%)    I&O:  27 May 2022 07:01  -  28 May 2022 07:00  --------------------------------------------------------  IN: 0 mL / OUT: 250 mL / NET: -250 mL    28 May 2022 07:01  -  29 May 2022 05:35  --------------------------------------------------------  IN: 349.6 mL / OUT: 600 mL / NET: -250.4 mL    Labs:             8.4    12.22 )-----------( 152      ( 29 May 2022 04:11 )             26.0     130<L>  |  95<L>  |  16.4  ----------------------------<  170<H>  4.4   |  23.0  |  0.51    Ca    8.5<L>      29 May 2022 04:11  Phos  3.9     05-29  Mg     2.3     05-29    TPro  5.9<L>  /  Alb  3.1<L>  /  TBili  0.7  /  DBili  x   /  AST  21  /  ALT  35<H>  /  AlkPhos  99  05-28  Alb: 3.1 g/dL / Pro: 5.9 g/dL / ALK PHOS: 99 U/L / ALT: 35 U/L / AST: 21 U/L / GGT: x           PT/INR - ( 28 May 2022 21:45 )   PT: 15.0 sec;   INR: 1.29 ratio    PTT - ( 28 May 2022 21:45 )  PTT:27.3 sec

## 2022-05-29 NOTE — PROGRESS NOTE ADULT - CRITICAL CARE ATTENDING COMMENT
Patient is critically ill seconadry to neurologic IPH and possible seizure and at risk of herniation and brain swelling.

## 2022-05-29 NOTE — CONSULT NOTE ADULT - SUBJECTIVE AND OBJECTIVE BOX
[facility]                                        Neurology at [place]                                  Shanta Sosa, & Marcello                                      [address]                                           [place], [place], [address]                                                [phone]        CC: intracranial hemorrhage and altered mental status.    HISTORY:  The patient is a 89y woman admitted 5/25 after trip/fall with intracranial hemorrhage in the right posterior parietal-occipital region. She was admitted to neuro-ICU.  Downgraded from ICU 5/26.  On 5/28 at ~5:15 pm she was found altered.   Initially urgent CT requested but neurosurgery was told there would be delay secondary to other studies in queue.   Stroke code called to expedite CT.  I had spoken with the neurosurgery PA covering the patient.  She described that the patient had aphasia and weakness on both sides.   The patient was transferred back to neuro-ICU.    PAST MEDICAL & SURGICAL HISTORY:  Diabetes mellitus  Hyperlipidemia  Hypothyroidism  S/P cholecystectomy    MEDICATIONS  (STANDING):  ascorbic acid 500 milliGRAM(s) Oral daily  chlorhexidine 2% Cloths 1 Application(s) Topical daily  ferrous    sulfate 325 milliGRAM(s) Oral daily  lacosamide 100 milliGRAM(s) Oral two times a day  levothyroxine 50 MICROGram(s) Oral daily  multivitamin 1 Tablet(s) Oral daily  polyethylene glycol 3350 17 Gram(s) Oral daily  senna 2 Tablet(s) Oral at bedtime    MEDICATIONS  (PRN):  acetaminophen     Tablet .. 650 milliGRAM(s) Oral every 6 hours PRN Temp greater or equal to 38C (100.4F), Mild Pain (1 - 3)  hydrALAZINE Injectable 10 milliGRAM(s) IV Push every 2 hours PRN SBP > 140  labetalol Injectable 10 milliGRAM(s) IV Push every 2 hours PRN Systolic blood pressure > 140  ondansetron Injectable 4 milliGRAM(s) IV Push every 6 hours PRN Nausea and/or Vomiting  oxyCODONE    IR 5 milliGRAM(s) Oral every 6 hours PRN Moderate Pain (4 - 6)    Allergies  Celebrex (Unknown)  iodine (Unknown)    SOCIAL HISTORY:  Non smoker.     FAMILY HISTORY:  No pertinent family history in first degree relatives  No known family history of stroke.     ROS:  Constitutional: The patient denies fevers or weight changes.  Neuro: As per HPI.  Eyes: Denies blurry vision.  Ears/nose/throat: Denies Tinnitus.   Cardiac: Denies chest pain. Denies palpitations.  Respiratory: Denies shortness of breath.  GI: Denies abdominal pain, nausea, or vomiting.  : Denies change in urinary pattern.  Integumentary: Denies rash.  Psych: Denies recent mood changes.  Heme: denies easy bleeding/bruising.    Exam:  Vital Signs Last 24 Hrs  T(C): 36.7 (29 May 2022 08:44), Max: 38.5 (28 May 2022 16:21)  T(F): 98.1 (29 May 2022 08:44), Max: 101.3 (28 May 2022 16:21)  HR: 82 (29 May 2022 10:00) (69 - 92)  BP: 112/48 (29 May 2022 10:00) (95/46 - 125/47)  BP(mean): 68 (29 May 2022 10:00) (60 - 95)  RR: 20 (29 May 2022 10:00) (15 - 24)  SpO2: 99% (29 May 2022 10:00) (92% - 99%)  General: NAD.   Carotid bruits absent.     Mental status: The patient is awake, alert, and oriented to self/hospital. Can give month but not day/date. There is no aphasia.     Cranial nerves: There is no papilledema. Pupils react symmetrically to light. There is no visual field deficit to confrontation. Extraocular motion is full with no nystagmus.  Facial sensation is intact. Facial musculature is asymmetric with a subtle depression of the left nasolabial fold. Hearing is poor bilaterally. Palate elevates symmetrically. Tongue is midline.    Motor: There is normal bulk and tone.  Strength is 5/5 in the right arm and leg.   Strength is 5-/5 in the left arm and leg.    Sensation: Intact to light touch and pin. There is no extinction to double simultaneous stimulation.    Reflexes: 1+ throughout and plantar responses are flexor.    Cerebellar: There is no dysmetria on finger to nose testing.    NIH SS:   Date: 5/29/22  Time:   1a) Level of consciousness (0-3): 0  1b) Questions (0-2): 0  1c) Commands (0-2): 0  2  ) Gaze (0-2): 0  3  ) Visual field (0-3): 0  4  ) Facial palsy (0-3): 1  Motor  5a) Left arm (0-4): 0  5b) Right arm (0-4): 0  6a) Left leg (0-4): 0  6b) Right leg (0-4): 0  7  ) Ataxia (0-2): 0  Sensory  8  ) Sensory (0-2): 0  Speech  9  ) Language (0-3): 0  10) Dysarthria (0-2): 0  Extinction  11) Extinction/inattention (0-2): 0    Total score: 1    Prestroke Modified Samuel: 0-1    (0: No symptoms and no disability.  1: No significant disability despite symptoms; able to carry out all usual duties and activities.  2: Slight disability; unable to carry out all previous activity but able to look after own affairs without assistance.  3: Moderate disability; requiring some help but able to walk without assistance.   4: Moderately severe disability; unable to walk without assistance and unable to attend to own bodily needs without assistance.  5: Severe disability; bedridden, incontinent and requiring constant nursing care and attention.   6: Dead. )     LABS:                         8.4    12.22 )-----------( 152      ( 29 May 2022 04:11 )             26.0       05-29    130<L>  |  95<L>  |  16.4  ----------------------------<  170<H>  4.4   |  23.0  |  0.51    Ca    8.5<L>      29 May 2022 04:11  Phos  3.9     05-29  Mg     2.3     05-29    TPro  5.9<L>  /  Alb  3.1<L>  /  TBili  0.7  /  DBili  x   /  AST  21  /  ALT  35<H>  /  AlkPhos  99  05-28      PT/INR - ( 28 May 2022 21:45 )   PT: 15.0 sec;   INR: 1.29 ratio         PTT - ( 28 May 2022 21:45 )  PTT:27.3 sec    RADIOLOGY   CT head 5/28 images reviewed. There is right posterior parietal-occipital intracranial hemorrhage which is stable from prior scan5/26.  There is no acute infarct.

## 2022-05-29 NOTE — PROGRESS NOTE ADULT - ASSESSMENT
89 year old female with DM, HLD, Hypothyroidism adm on 5/25 with right Occipital IPH now returns to the ICU s/p code stroke for exp aphasia.    Plan:  - Q1 neuro checks, readm to Neuro ICU  - S/P 200 Vimpat, Continue 100 BID. Stop Keppra  - EEG - P  - MRI/A when able  - Pain control PRN, avoid oversedation  - Will discuss DVT ppx  - Further plan pending AM rounds with neurosurgeon

## 2022-05-29 NOTE — CHART NOTE - NSCHARTNOTEFT_GEN_A_CORE
EEG reviewed until ~1330.    generalized periodic discharges with triphasic morphology noted, which are typically seen in metabolic encephalopathy, but may increase the risk for seizures in certain clinical situations. Clinical correlation is advised.       Final report to be completed at the completion of the study tomorrow morning

## 2022-05-29 NOTE — PROGRESS NOTE ADULT - SUBJECTIVE AND OBJECTIVE BOX
HPI: Patient is a 89 y/o female transfer from [place] s/p trip and fall. Imaging studies at [place] revealed right intraparenchymal hemorrhage prompting transfer to [facility] for neurosurgical evaluation & management. On arrival patient with no acute complaints. States she tripped and fell, no loss of consciousness, reports she takes a baby aspirin daily. No other reported anticoagulation / antiplatelet use. Airway: intact, c-collar in place on arrival. Breathing: breath sounds CTA b/l, no accessory muscle use, no conversational dyspnea. Circulation: 2+ central & peripheral pulses b/l. Disability: pupils 2mm round and reactive to light b/l, GCS15. Exposure: fully exposed and covered w/ warm blankets. CXR & pelvis XR performed without obvious acute traumatic findings. PIV placed by RN and labs drawn. istat pH 7.45, base excess +5, Hgb 9.9. (Hgb 10.3 on blood work from [place]). Imaging studies from [place] reviewed with Dr. Conley. CT head showed R intraparenchymal hemorrhage. CT cervical spine without acute findings, degenerative changes noted. Neurosurgical consult placed @ 1045. After physical exam and pt without neuro deficits / distracting injuries, cervical collar cleared by resident MD Taylor.  (25 May 2022 10:47)    Interval Events: Pt had been admitted to the ICU on 5/25 and downgrade on 5/26 after remaining stable with HCT. SDU/Floor course uneventful through 5/27.  24 Hr Events: Noted to have acute onset dysarthria, exp aphasia on floor. Code stroke called. CTH stable and upgraded to Neuro ICU. Exam slowly improving. Added Vimpat with initial 200 mg load, Keppra stopped. Labs unremarkable. Collateral history obtained from daughter-in-law, per family, pt had slowly been declining neurologically over past few days with intermittent fevers.     Vitals:  ICU Vital Signs Last 24 Hrs  T(C): 36.7 (29 May 2022 04:34), Max: 38.5 (28 May 2022 16:21)  T(F): 98.1 (29 May 2022 04:34), Max: 101.3 (28 May 2022 16:21)  HR: 79 (29 May 2022 07:00) (69 - 92)  BP: 110/72 (29 May 2022 07:00) (95/46 - 125/47)  BP(mean): 85 (29 May 2022 07:00) (60 - 95)  RR: 21 (29 May 2022 07:00) (15 - 24)  SpO2: 98% (29 May 2022 07:00) (92% - 99%)      I&O:    28 May 2022 07:01  -  29 May 2022 07:00  --------------------------------------------------------  IN:    IV PiggyBack: 50 mL    IV PiggyBack: 249.6 mL    IV PiggyBack: 50 mL  Total IN: 349.6 mL    OUT:    Voided (mL): 600 mL  Total OUT: 600 mL    Total NET: -250.4 mL          Labs:             8.4    12.22 )-----------( 152      ( 29 May 2022 04:11 )             26.0     130<L>  |  95<L>  |  16.4  ----------------------------<  170<H>  4.4   |  23.0  |  0.51    Ca    8.5<L>      29 May 2022 04:11  Phos  3.9     05-29  Mg     2.3     05-29    TPro  5.9<L>  /  Alb  3.1<L>  /  TBili  0.7  /  DBili  x   /  AST  21  /  ALT  35<H>  /  AlkPhos  99  05-28  Alb: 3.1 g/dL / Pro: 5.9 g/dL / ALK PHOS: 99 U/L / ALT: 35 U/L / AST: 21 U/L / GGT: x           PT/INR - ( 28 May 2022 21:45 )   PT: 15.0 sec;   INR: 1.29 ratio    PTT - ( 28 May 2022 21:45 )  PTT:27.3 sec    UA - neg     CT Head No Cont (05.28.22 @ 18:23) >    IMPRESSION:  1. Stable appearance of previously visualized right posterior   parietal/occipital intraparenchymal hematoma. Mild surrounding edema.  2. No evidence of an acute large arterial distribution infarct.        Medications:  STANDING:  ascorbic acid 500 milliGRAM(s) Oral daily  chlorhexidine 2% Cloths 1 Application(s) Topical daily  ferrous    sulfate 325 milliGRAM(s) Oral daily  lacosamide 100 milliGRAM(s) Oral two times a day  levothyroxine 50 MICROGram(s) Oral daily  multivitamin 1 Tablet(s) Oral daily  polyethylene glycol 3350 17 Gram(s) Oral daily  senna 2 Tablet(s) Oral at bedtime    PRN:  acetaminophen     Tablet .. 650 milliGRAM(s) Oral every 6 hours PRN Temp greater or equal to 38C (100.4F), Mild Pain (1 - 3)  hydrALAZINE Injectable 10 milliGRAM(s) IV Push every 2 hours PRN SBP > 140  labetalol Injectable 10 milliGRAM(s) IV Push every 2 hours PRN Systolic blood pressure > 140  ondansetron Injectable 4 milliGRAM(s) IV Push every 6 hours PRN Nausea and/or Vomiting  oxyCODONE    IR 5 milliGRAM(s) Oral every 6 hours PRN Moderate Pain (4 - 6)    Physical Exam:  Constitutional: NAD  Neuro  * Mental Status:  Awake, alert, Ox2-3 (interm getting place correct), able to speak in short sentences, FC  Cranial Nerves: Cnii-Cnxii grossly intact. PERRL, EOMI, tongue midline, rt gaze preference however able to overcome   * Motor: RUE 4/5, LUE 4/5, RLE 4/5, LLE 4/5  * Sensory: Sensation intact to light touch  * Reflexes: Not assessed  * Gait: Not assessed     HPI: Patient is a 89 y/o [place] [facility] [place] Imaging studies at [place] revealed right intraparenchymal hemorrhage prompting transfer to [facility] for neurosurgical evaluation & management. On arrival patient with no acute complaints. States she tripped and fell, no loss of consciousness, reports she takes a baby aspirin daily. No other reported anticoagulation / antiplatelet use. Airway: intact, c-collar in place on arrival. Breathing: breath sounds CTA b/l, no accessory muscle use, no conversational dyspnea. Circulation: 2+ central & peripheral pulses b/l. Disability: pupils 2mm round and reactive to light b/l, GCS15. Exposure: fully exposed and covered w/ warm blankets. CXR & pelvis XR performed without obvious acute traumatic findings. PIV placed by RN and labs drawn. istat pH 7.45, base excess +5, Hgb 9.9. (Hgb 10.3 on blood work from [facility] [place] [facility] [place]. Conley. CT head showed R intraparenchymal hemorrhage. CT cervical spine without acute findings, degenerative changes noted. Neurosurgical consult placed @ 1045. After physical exam and pt without neuro deficits / distracting injuries, cervical collar cleared by resident MD Taylor.  (25 May 2022 10:47)    Interval Events: Pt had been admitted to the ICU on 5/25 and downgrade on 5/26 after remaining stable with HCT. SDU/Floor course uneventful through 5/27.  24 Hr Events: Noted to have acute onset dysarthria, exp aphasia on floor. Code stroke called. CTH stable and upgraded to Neuro ICU. Exam slowly improving. Added Vimpat with initial 200 mg load, Keppra stopped. Labs unremarkable. Collateral history obtained from daughter-in-law, per family, pt had slowly been declining neurologically over past few days with intermittent fevers.     Vitals:  ICU Vital Signs Last 24 Hrs  T(C): 36.7 (29 May 2022 04:34), Max: 38.5 (28 May 2022 16:21)  T(F): 98.1 (29 May 2022 04:34), Max: 101.3 (28 May 2022 16:21)  HR: 79 (29 May 2022 07:00) (69 - 92)  BP: 110/72 (29 May 2022 07:00) (95/46 - 125/47)  BP(mean): 85 (29 May 2022 07:00) (60 - 95)  RR: 21 (29 May 2022 07:00) (15 - 24)  SpO2: 98% (29 May 2022 07:00) (92% - 99%)      I&O:    28 May 2022 07:01  -  29 May 2022 07:00  --------------------------------------------------------  IN:    IV PiggyBack: 50 mL    IV PiggyBack: 249.6 mL    IV PiggyBack: 50 mL  Total IN: 349.6 mL    OUT:    Voided (mL): 600 mL  Total OUT: 600 mL    Total NET: -250.4 mL    MEDICATIONS  (STANDING):  ascorbic acid 500 milliGRAM(s) Oral daily  chlorhexidine 2% Cloths 1 Application(s) Topical daily  ferrous    sulfate 325 milliGRAM(s) Oral daily  lacosamide 100 milliGRAM(s) Oral two times a day  levothyroxine 50 MICROGram(s) Oral daily  multivitamin 1 Tablet(s) Oral daily  polyethylene glycol 3350 17 Gram(s) Oral daily  senna 2 Tablet(s) Oral at bedtime    MEDICATIONS  (PRN):  acetaminophen     Tablet .. 650 milliGRAM(s) Oral every 6 hours PRN Temp greater or equal to 38C (100.4F), Mild Pain (1 - 3)  hydrALAZINE Injectable 10 milliGRAM(s) IV Push every 2 hours PRN SBP > 140  labetalol Injectable 10 milliGRAM(s) IV Push every 2 hours PRN Systolic blood pressure > 140  ondansetron Injectable 4 milliGRAM(s) IV Push every 6 hours PRN Nausea and/or Vomiting  oxyCODONE    IR 5 milliGRAM(s) Oral every 6 hours PRN Moderate Pain (4 - 6)            Labs:             8.4    12.22 )-----------( 152      ( 29 May 2022 04:11 )             26.0     130<L>  |  95<L>  |  16.4  ----------------------------<  170<H>  4.4   |  23.0  |  0.51    Ca    8.5<L>      29 May 2022 04:11  Phos  3.9     05-29  Mg     2.3     05-29    TPro  5.9<L>  /  Alb  3.1<L>  /  TBili  0.7  /  DBili  x   /  AST  21  /  ALT  35<H>  /  AlkPhos  99  05-28  Alb: 3.1 g/dL / Pro: 5.9 g/dL / ALK PHOS: 99 U/L / ALT: 35 U/L / AST: 21 U/L / GGT: x           PT/INR - ( 28 May 2022 21:45 )   PT: 15.0 sec;   INR: 1.29 ratio    PTT - ( 28 May 2022 21:45 )  PTT:27.3 sec    UA - neg     CT Head No Cont (05.28.22 @ 18:23) >    IMPRESSION:  1. Stable appearance of previously visualized right posterior   parietal/occipital intraparenchymal hematoma. Mild surrounding edema.  2. No evidence of an acute large arterial distribution infarct.        Physical Exam:  Constitutional: NAD  Neuro  * Mental Status:  Awake, alert, Ox2-3 (interm getting place correct), able to speak in short sentences, FC  Cranial Nerves: Cnii-Cnxii grossly intact. PERRL, EOMI, tongue midline, rt gaze preference however able to overcome   * Motor: RUE 4/5, LUE 4/5, RLE 4/5, LLE 4/5  * Sensory: Sensation intact to light touch  * Reflexes: Not assessed  * Gait: Not assessed     HPI: Patient is a 89 y/o female [place] [facility] [place]. Imaging studies at [place] revealed right intraparenchymal hemorrhage prompting transfer to [facility] for neurosurgical evaluation & management. On arrival patient with no acute complaints. States she tripped and fell, no loss of consciousness, reports she takes a baby aspirin daily. No other reported anticoagulation / antiplatelet use. Airway: intact, c-collar in place on arrival. Breathing: breath sounds CTA b/l, no accessory muscle use, no conversational dyspnea. Circulation: 2+ central & peripheral pulses b/l. Disability: pupils 2mm round and reactive to light b/l, GCS15. Exposure: fully exposed and covered w/ warm blankets. CXR & pelvis XR performed without obvious acute traumatic findings. PIV placed by RN and labs drawn. istat pH 7.45, base excess +5, Hgb 9.9. (Hgb 10.3 on blood work from [facility] [place] Imaging studies from [facility] [place] Dr. Conley. CT head showed R intraparenchymal hemorrhage. CT cervical spine without acute findings, degenerative changes noted. Neurosurgical consult placed @ 1045. After physical exam and pt without neuro deficits / distracting injuries, cervical collar cleared by resident MD Taylor.  (25 May 2022 10:47)    Interval Events: Pt had been admitted to the ICU on 5/25 and downgrade on 5/26 after remaining stable with HCT. SDU/Floor course uneventful through 5/27.  24 Hr Events: Noted to have acute onset dysarthria, exp aphasia on floor. Code stroke called. CTH stable and upgraded to Neuro ICU. Exam slowly improving. Added Vimpat with initial 200 mg load, Keppra stopped. Labs unremarkable. Collateral history obtained from daughter-in-law, per family, pt had slowly been declining neurologically over past few days with intermittent fevers.     Vitals:  ICU Vital Signs Last 24 Hrs  T(C): 36.7 (29 May 2022 04:34), Max: 38.5 (28 May 2022 16:21)  T(F): 98.1 (29 May 2022 04:34), Max: 101.3 (28 May 2022 16:21)  HR: 79 (29 May 2022 07:00) (69 - 92)  BP: 110/72 (29 May 2022 07:00) (95/46 - 125/47)  BP(mean): 85 (29 May 2022 07:00) (60 - 95)  RR: 21 (29 May 2022 07:00) (15 - 24)  SpO2: 98% (29 May 2022 07:00) (92% - 99%)      I&O:    28 May 2022 07:01  -  29 May 2022 07:00  --------------------------------------------------------  IN:    IV PiggyBack: 50 mL    IV PiggyBack: 249.6 mL    IV PiggyBack: 50 mL  Total IN: 349.6 mL    OUT:    Voided (mL): 600 mL  Total OUT: 600 mL    Total NET: -250.4 mL    MEDICATIONS  (STANDING):  ascorbic acid 500 milliGRAM(s) Oral daily  chlorhexidine 2% Cloths 1 Application(s) Topical daily  ferrous    sulfate 325 milliGRAM(s) Oral daily  lacosamide 100 milliGRAM(s) Oral two times a day  levothyroxine 50 MICROGram(s) Oral daily  multivitamin 1 Tablet(s) Oral daily  polyethylene glycol 3350 17 Gram(s) Oral daily  senna 2 Tablet(s) Oral at bedtime    MEDICATIONS  (PRN):  acetaminophen     Tablet .. 650 milliGRAM(s) Oral every 6 hours PRN Temp greater or equal to 38C (100.4F), Mild Pain (1 - 3)  hydrALAZINE Injectable 10 milliGRAM(s) IV Push every 2 hours PRN SBP > 140  labetalol Injectable 10 milliGRAM(s) IV Push every 2 hours PRN Systolic blood pressure > 140  ondansetron Injectable 4 milliGRAM(s) IV Push every 6 hours PRN Nausea and/or Vomiting  oxyCODONE    IR 5 milliGRAM(s) Oral every 6 hours PRN Moderate Pain (4 - 6)            Labs:             8.4    12.22 )-----------( 152      ( 29 May 2022 04:11 )             26.0     130<L>  |  95<L>  |  16.4  ----------------------------<  170<H>  4.4   |  23.0  |  0.51    Ca    8.5<L>      29 May 2022 04:11  Phos  3.9     05-29  Mg     2.3     05-29    TPro  5.9<L>  /  Alb  3.1<L>  /  TBili  0.7  /  DBili  x   /  AST  21  /  ALT  35<H>  /  AlkPhos  99  05-28  Alb: 3.1 g/dL / Pro: 5.9 g/dL / ALK PHOS: 99 U/L / ALT: 35 U/L / AST: 21 U/L / GGT: x           PT/INR - ( 28 May 2022 21:45 )   PT: 15.0 sec;   INR: 1.29 ratio    PTT - ( 28 May 2022 21:45 )  PTT:27.3 sec    UA - neg     CT Head No Cont (05.28.22 @ 18:23) >    IMPRESSION:  1. Stable appearance of previously visualized right posterior   parietal/occipital intraparenchymal hematoma. Mild surrounding edema.  2. No evidence of an acute large arterial distribution infarct.        Physical Exam:  Constitutional: NAD  Neuro  * Mental Status:  Awake, alert x3 ,communicates 4 words in a sentence   Cranial Nerves: Cnii-Cnxii grossly intact. PERRL, EOMI, tongue midline, rt gaze preference however able to overcome   * Motor: RUE 4+/5, LUE 4+/5, RLE 4+/5, LLE 4+/5  * Sensory: Sensation intact to light touch  * Reflexes: Not assessed  * Gait: Not assessed

## 2022-05-29 NOTE — CONSULT NOTE ADULT - ASSESSMENT
The patient is a 89y Female with admission for intracranial hemorrhage now status post episode of altered mental status and weakness.     Encephalopathy.   The episode does not suggest stroke.   Would not have been t-PA candidate secondary to intracranial hemorrhage.  Family had reported that she has been intermittently confused since transfer out of ICU initially.   Must rule out seizure.   Agree with plan for EEG.   Patient changed to Vimpat.     Intracranial hemorrhage   Stable by imaging.     Case discussed with Neuro-ICU team (Dr Echavarria attending).

## 2022-05-30 LAB
ANION GAP SERPL CALC-SCNC: 12 MMOL/L — SIGNIFICANT CHANGE UP (ref 5–17)
BUN SERPL-MCNC: 20 MG/DL — SIGNIFICANT CHANGE UP (ref 8–20)
CALCIUM SERPL-MCNC: 8.2 MG/DL — LOW (ref 8.6–10.2)
CHLORIDE SERPL-SCNC: 99 MMOL/L — SIGNIFICANT CHANGE UP (ref 98–107)
CO2 SERPL-SCNC: 23 MMOL/L — SIGNIFICANT CHANGE UP (ref 22–29)
CREAT SERPL-MCNC: 0.54 MG/DL — SIGNIFICANT CHANGE UP (ref 0.5–1.3)
EGFR: 84 ML/MIN/1.73M2 — SIGNIFICANT CHANGE UP
GLUCOSE BLDC GLUCOMTR-MCNC: 282 MG/DL — HIGH (ref 70–99)
GLUCOSE BLDC GLUCOMTR-MCNC: 287 MG/DL — HIGH (ref 70–99)
GLUCOSE SERPL-MCNC: 171 MG/DL — HIGH (ref 70–99)
HCT VFR BLD CALC: 26.1 % — LOW (ref 34.5–45)
HGB BLD-MCNC: 8.3 G/DL — LOW (ref 11.5–15.5)
MAGNESIUM SERPL-MCNC: 2 MG/DL — SIGNIFICANT CHANGE UP (ref 1.6–2.6)
MCHC RBC-ENTMCNC: 29.3 PG — SIGNIFICANT CHANGE UP (ref 27–34)
MCHC RBC-ENTMCNC: 31.8 GM/DL — LOW (ref 32–36)
MCV RBC AUTO: 92.2 FL — SIGNIFICANT CHANGE UP (ref 80–100)
PHOSPHATE SERPL-MCNC: 2.8 MG/DL — SIGNIFICANT CHANGE UP (ref 2.4–4.7)
PLATELET # BLD AUTO: 157 K/UL — SIGNIFICANT CHANGE UP (ref 150–400)
POTASSIUM SERPL-MCNC: 4 MMOL/L — SIGNIFICANT CHANGE UP (ref 3.5–5.3)
POTASSIUM SERPL-SCNC: 4 MMOL/L — SIGNIFICANT CHANGE UP (ref 3.5–5.3)
RBC # BLD: 2.83 M/UL — LOW (ref 3.8–5.2)
RBC # FLD: 15.3 % — HIGH (ref 10.3–14.5)
SODIUM SERPL-SCNC: 134 MMOL/L — LOW (ref 135–145)
WBC # BLD: 8.51 K/UL — SIGNIFICANT CHANGE UP (ref 3.8–10.5)
WBC # FLD AUTO: 8.51 K/UL — SIGNIFICANT CHANGE UP (ref 3.8–10.5)

## 2022-05-30 PROCEDURE — 99232 SBSQ HOSP IP/OBS MODERATE 35: CPT

## 2022-05-30 PROCEDURE — 99223 1ST HOSP IP/OBS HIGH 75: CPT

## 2022-05-30 PROCEDURE — 99233 SBSQ HOSP IP/OBS HIGH 50: CPT

## 2022-05-30 PROCEDURE — 95720 EEG PHY/QHP EA INCR W/VEEG: CPT

## 2022-05-30 RX ORDER — INSULIN LISPRO 100/ML
VIAL (ML) SUBCUTANEOUS
Refills: 0 | Status: DISCONTINUED | OUTPATIENT
Start: 2022-05-30 | End: 2022-06-07

## 2022-05-30 RX ORDER — GLUCAGON INJECTION, SOLUTION 0.5 MG/.1ML
1 INJECTION, SOLUTION SUBCUTANEOUS ONCE
Refills: 0 | Status: DISCONTINUED | OUTPATIENT
Start: 2022-05-30 | End: 2022-06-07

## 2022-05-30 RX ORDER — INSULIN LISPRO 100/ML
VIAL (ML) SUBCUTANEOUS AT BEDTIME
Refills: 0 | Status: DISCONTINUED | OUTPATIENT
Start: 2022-05-30 | End: 2022-06-07

## 2022-05-30 RX ORDER — ENOXAPARIN SODIUM 100 MG/ML
40 INJECTION SUBCUTANEOUS
Refills: 0 | Status: DISCONTINUED | OUTPATIENT
Start: 2022-05-30 | End: 2022-06-07

## 2022-05-30 RX ORDER — DEXTROSE 50 % IN WATER 50 %
15 SYRINGE (ML) INTRAVENOUS ONCE
Refills: 0 | Status: DISCONTINUED | OUTPATIENT
Start: 2022-05-30 | End: 2022-06-07

## 2022-05-30 RX ORDER — DEXTROSE 50 % IN WATER 50 %
25 SYRINGE (ML) INTRAVENOUS ONCE
Refills: 0 | Status: DISCONTINUED | OUTPATIENT
Start: 2022-05-30 | End: 2022-06-07

## 2022-05-30 RX ORDER — SODIUM CHLORIDE 9 MG/ML
1000 INJECTION, SOLUTION INTRAVENOUS
Refills: 0 | Status: DISCONTINUED | OUTPATIENT
Start: 2022-05-30 | End: 2022-06-07

## 2022-05-30 RX ORDER — DEXTROSE 50 % IN WATER 50 %
12.5 SYRINGE (ML) INTRAVENOUS ONCE
Refills: 0 | Status: DISCONTINUED | OUTPATIENT
Start: 2022-05-30 | End: 2022-06-07

## 2022-05-30 RX ORDER — PANTOPRAZOLE SODIUM 20 MG/1
40 TABLET, DELAYED RELEASE ORAL
Refills: 0 | Status: DISCONTINUED | OUTPATIENT
Start: 2022-05-30 | End: 2022-06-07

## 2022-05-30 RX ADMIN — CHLORHEXIDINE GLUCONATE 1 APPLICATION(S): 213 SOLUTION TOPICAL at 11:23

## 2022-05-30 RX ADMIN — Medication 325 MILLIGRAM(S): at 11:22

## 2022-05-30 RX ADMIN — Medication 50 MICROGRAM(S): at 05:18

## 2022-05-30 RX ADMIN — ENOXAPARIN SODIUM 40 MILLIGRAM(S): 100 INJECTION SUBCUTANEOUS at 17:34

## 2022-05-30 RX ADMIN — LACOSAMIDE 100 MILLIGRAM(S): 50 TABLET ORAL at 18:13

## 2022-05-30 RX ADMIN — POLYETHYLENE GLYCOL 3350 17 GRAM(S): 17 POWDER, FOR SOLUTION ORAL at 11:22

## 2022-05-30 RX ADMIN — Medication 1 TABLET(S): at 11:22

## 2022-05-30 RX ADMIN — Medication 62.5 MILLIMOLE(S): at 05:18

## 2022-05-30 RX ADMIN — Medication 650 MILLIGRAM(S): at 21:45

## 2022-05-30 RX ADMIN — Medication 500 MILLIGRAM(S): at 11:22

## 2022-05-30 RX ADMIN — SENNA PLUS 2 TABLET(S): 8.6 TABLET ORAL at 21:46

## 2022-05-30 RX ADMIN — PANTOPRAZOLE SODIUM 40 MILLIGRAM(S): 20 TABLET, DELAYED RELEASE ORAL at 18:13

## 2022-05-30 RX ADMIN — LACOSAMIDE 100 MILLIGRAM(S): 50 TABLET ORAL at 05:18

## 2022-05-30 NOTE — PROGRESS NOTE ADULT - ASSESSMENT
89 yo woman with ICH and episode of AMS    ICH/AMS  Agree with MRI brain w/wo contrast and MRA head w/o contrast as per neurosurgery  continue continuous EEG - if no seizures overnight, can discontinue tomorrow  C/w vimpat 100mg bid    Will continue to follow

## 2022-05-30 NOTE — PROGRESS NOTE ADULT - ASSESSMENT
89 year old female with DM, HLD, Hypothyroidism adm on 5/25 with right Occipital IPH now returns to the ICU s/p code stroke for exp aphasia.    Plan:  - Q1 neuro checks, readm to Neuro ICU  - S/P 200 Vimpat, Continue 100 BID. Stop Keppra  - EEG - P  - MRI/A when able  - Pain control PRN, avoid oversedation  - Will discuss DVT ppx  - Further plan pending AM rounds with neurosurgeon  89 year old female with DM, HLD, Hypothyroidism adm on 5/25 with right Occipital IPH now returns to the ICU s/p code stroke for exp aphasia.    Plan:  - Q4 neuro checks, readm to Neuro ICU  - S/P 200 Vimpat, Continue 100 BID  - EEG in progress, f/u report  - MRI/A when able  - Pain control PRN, avoid oversedation  - Will discuss DVT ppx  - Further plan pending AM rounds with neurosurgeon

## 2022-05-30 NOTE — PROGRESS NOTE ADULT - SUBJECTIVE AND OBJECTIVE BOX
HPI: Patient is a 89 y/o female [place] [facility] [place]. Imaging studies at [place] revealed right intraparenchymal hemorrhage prompting transfer to [facility] for neurosurgical evaluation & management. On arrival patient with no acute complaints. States she tripped and fell, no loss of consciousness, reports she takes a baby aspirin daily. No other reported anticoagulation / antiplatelet use. Airway: intact, c-collar in place on arrival. Breathing: breath sounds CTA b/l, no accessory muscle use, no conversational dyspnea. Circulation: 2+ central & peripheral pulses b/l. Disability: pupils 2mm round and reactive to light b/l, GCS15. Exposure: fully exposed and covered w/ warm blankets. CXR & pelvis XR performed without obvious acute traumatic findings. PIV placed by RN and labs drawn. istat pH 7.45, base excess +5, Hgb 9.9. (Hgb 10.3 on blood work from [facility] [place] Imaging studies from [facility] [place] with Dr. Conley. CT head showed R intraparenchymal hemorrhage. CT cervical spine without acute findings, degenerative changes noted. Neurosurgical consult placed @ 1045. After physical exam and pt without neuro deficits / distracting injuries, cervical collar cleared by resident MD Taylor.  (25 May 2022 10:47)    Interval Events: Pt had been admitted to the ICU on 5/25 and downgrade on 5/26 after remaining stable with HCT. SDU/Floor course uneventful through 5/27. Re-upgraded to Neuro ICU on 5/28 after code stroke on the floor with dysarthria, exp aphasia.   24 Hr Events: Now on EEG,     ** incomplete below***    Physical Exam:  Constitutional: NAD  Neuro  * Mental Status:  Awake, alert, Ox2-3 (interm getting place correct), able to speak in short sentences, FC  Cranial Nerves: Cnii-Cnxii grossly intact. PERRL, EOMI, tongue midline, rt gaze preference however able to overcome   * Motor: RUE 4/5, LUE 4/5, RLE 4/5, LLE 4/5  * Sensory: Sensation intact to light touch  * Reflexes: Not assessed  * Gait: Not assessed    Vitals:  T(C): 36.7 (29 May 2022 04:34), Max: 38.5 (28 May 2022 16:21)  T(F): 98.1 (29 May 2022 04:34), Max: 101.3 (28 May 2022 16:21)  HR: 82 (29 May 2022 03:00) (69 - 92)  BP: 109/48 (29 May 2022 03:00) (95/46 - 120/65)  BP(mean): 66 (29 May 2022 03:00) (62 - 95)  RR: 17 (29 May 2022 03:00) (15 - 24)  SpO2: 99% (29 May 2022 03:00) (92% - 99%)    I&O:  27 May 2022 07:01  -  28 May 2022 07:00  --------------------------------------------------------  IN: 0 mL / OUT: 250 mL / NET: -250 mL    28 May 2022 07:01  -  29 May 2022 05:35  --------------------------------------------------------  IN: 349.6 mL / OUT: 600 mL / NET: -250.4 mL    Labs:             8.4    12.22 )-----------( 152      ( 29 May 2022 04:11 )             26.0     130<L>  |  95<L>  |  16.4  ----------------------------<  170<H>  4.4   |  23.0  |  0.51    Ca    8.5<L>      29 May 2022 04:11  Phos  3.9     05-29  Mg     2.3     05-29    TPro  5.9<L>  /  Alb  3.1<L>  /  TBili  0.7  /  DBili  x   /  AST  21  /  ALT  35<H>  /  AlkPhos  99  05-28  Alb: 3.1 g/dL / Pro: 5.9 g/dL / ALK PHOS: 99 U/L / ALT: 35 U/L / AST: 21 U/L / GGT: x           PT/INR - ( 28 May 2022 21:45 )   PT: 15.0 sec;   INR: 1.29 ratio    PTT - ( 28 May 2022 21:45 )  PTT:27.3 sec HPI: Patient is a 89 y/o female transfer from [facility] [place]/p trip and fall. Imaging studies at [place] revealed right intraparenchymal hemorrhage prompting transfer to [facility] for neurosurgical evaluation & management. On arrival patient with no acute complaints. States she tripped and fell, no loss of consciousness, reports she takes a baby aspirin daily. No other reported anticoagulation / antiplatelet use. Airway: intact, c-collar in place on arrival. Breathing: breath sounds CTA b/l, no accessory muscle use, no conversational dyspnea. Circulation: 2+ central & peripheral pulses b/l. Disability: pupils 2mm round and reactive to light b/l, GCS15. Exposure: fully exposed and covered w/ warm blankets. CXR & pelvis XR performed without obvious acute traumatic findings. PIV placed by RN and labs drawn. istat pH 7.45, base excess +5, Hgb 9.9. (Hgb 10.3 on blood work from [facility] [place]). Imaging studies from [facility] [place] reviewed with Dr. Conley. CT head showed R intraparenchymal hemorrhage. CT cervical spine without acute findings, degenerative changes noted. Neurosurgical consult placed @ 1045. After physical exam and pt without neuro deficits / distracting injuries, cervical collar cleared by resident MD Taylor.  (25 May 2022 10:47)    Interval Events: Pt had been admitted to the ICU on 5/25 and downgrade on 5/26 after remaining stable with HCT. SDU/Floor course uneventful through 5/27. Re-upgraded to Neuro ICU on 5/28 after code stroke on the floor with dysarthria, exp aphasia.   24 Hr Events: Now on EEG, neuro exam remains improving. no further episodes of aphasia. Hyponatremia improved s/p fluid administration     Physical Exam:  Constitutional: NAD  Neuro  * Mental Status:  Awake, alert, Ox3, FC  Cranial Nerves: Cnii-Cnxii grossly intact. PERRL, EOMI, tongue midline, rt gaze preference however able to overcome   * Motor: RUE 4/5, LUE 4/5, RLE 4/5, LLE 4/5  * Sensory: Sensation intact to light touch  * Reflexes: Not assessed  * Gait: Not assessed    Vitals:  T(C): 36.4 (29 May 2022 23:43), Max: 37.4 (29 May 2022 16:01)  T(F): 97.6 (29 May 2022 23:43), Max: 99.3 (29 May 2022 16:01)  HR: 72 (29 May 2022 23:00) (72 - 91)  BP: 105/53 (29 May 2022 23:00) (99/43 - 125/47)  BP(mean): 69 (29 May 2022 23:00) (60 - 85)  RR: 19 (29 May 2022 23:00) (17 - 29)  SpO2: 97% (29 May 2022 23:00) (96% - 100%)    I&O's Summary  28 May 2022 07:01  -  29 May 2022 07:00  --------------------------------------------------------  IN: 349.6 mL / OUT: 600 mL / NET: -250.4 mL    29 May 2022 07:01  -  30 May 2022 01:03  --------------------------------------------------------  IN: 1460 mL / OUT: 450 mL / NET: 1010 mL    LABS:             8.4    12.22 )-----------( 152      ( 29 May 2022 04:11 )             26.0   131<L>  |  95<L>  |  18.9  ----------------------------<  234<H>  4.5   |  23.0  |  0.49<L>    Ca    7.7<L>      29 May 2022 16:32  Phos  3.9     05-29  Mg     2.3     05-29    TPro  5.9<L>  /  Alb  3.1<L>  /  TBili  0.7  /  DBili  x   /  AST  21  /  ALT  35<H>  /  AlkPhos  99  05-28  Alb: 3.1 g/dL / Pro: 5.9 g/dL / ALK PHOS: 99 U/L / ALT: 35 U/L / AST: 21 U/L / GGT: x           PT/INR - ( 28 May 2022 21:45 )   PT: 15.0 sec;   INR: 1.29 ratio    PTT - ( 28 May 2022 21:45 )  PTT:27.3 sec

## 2022-05-30 NOTE — CONSULT NOTE ADULT - CONSULT REASON
intracranial hemorrhage and encephalopathy
IPH
Transfer to medicine
fall with ICH on scan
Increasing R. Intraparenchymal Hemorrhage

## 2022-05-30 NOTE — CHART NOTE - NSCHARTNOTEFT_GEN_A_CORE
INTERVAL HPI/OVERNIGHT EVENTS:  89 y/o female transferred to [facility] [place] [facility]  [place] s/p trip and fall, denied LOC. [place] at [facility] [place] right intraparenchymal hemorrhage prompting transfer to [facility] for neurosurgical evaluation & management. Patient was admitted to the NSICU for clinical observation. Repeat CT scans were notable for increased IPH which subsequently stabilized on last CT scan this morning . Patient's neurological exam on presentation was been normal with baseline left facial secondary to history of bell's palsy. There was a mild LUE drift noted intermittently on exam. During morning rounds, patient was cleared for a transfer in level of care to stepdown.      pt was noted to have a change in exam, dysarthric, expressive aphasia, intermittent receptive aphasia, antigravity B/L UE and B/L UE, intermittently following exam, answering questions "yes," "no," and stating her name. Code stroke called, NIH 13 upgraded to NSICU, Keppra changed to Vimpat, EEG ordered    [facility]      Vital Signs Last 24 Hrs  T(C): 36.3 (30 May 2022 08:00), Max: 37.4 (29 May 2022 16:01)  T(F): 97.3 (30 May 2022 08:00), Max: 99.3 (29 May 2022 16:01)  HR: 82 (30 May 2022 09:00) (63 - 91)  BP: 103/43 (30 May 2022 09:00) (96/47 - 135/88)  BP(mean): 62 (30 May 2022 09:00) (60 - 116)  RR: 19 (30 May 2022 09:00) (15 - 29)  SpO2: 99% (30 May 2022 09:00) (96% - 100%)      LABS:                        8.3    8.51  )-----------( 157      ( 30 May 2022 02:22 )             26.1     05-30    134<L>  |  99  |  20.0  ----------------------------<  171<H>  4.0   |  23.0  |  0.54    Ca    8.2<L>      30 May 2022 02:22  Phos  2.8       Mg     2.0         TPro  5.9<L>  /  Alb  3.1<L>  /  TBili  0.7  /  DBili  x   /  AST  21  /  ALT  35<H>  /  AlkPhos  99      PT/INR - ( 28 May 2022 21:45 )   PT: 15.0 sec;   INR: 1.29 ratio         PTT - ( 28 May 2022 21:45 )  PTT:27.3 sec  Urinalysis Basic - ( 29 May 2022 01:09 )    Color: Yellow / Appearance: Clear / S.020 / pH: x  Gluc: x / Ketone: Trace  / Bili: Negative / Urobili: Negative mg/dL   Blood: x / Protein: Negative / Nitrite: Negative   Leuk Esterase: Trace / RBC: 0-2 /HPF / WBC 0-2 /HPF   Sq Epi: x / Non Sq Epi: Occasional / Bacteria: x       @ 07: @ 07:00  --------------------------------------------------------  IN: 1887.5 mL / OUT: 1200 mL / NET: 687.5 mL     @ 07: @ 09:38  --------------------------------------------------------  IN: 125 mL / OUT: 0 mL / NET: 125 mL      A/P:   89y Female PMH DM, HLD, hypothyroidism, presented to [facility] [place]/p trip and fall without LOC, found with right occipital IPH.   -Pt stable from NSICU standpoint for downgrade. Pt signed out to medical team.     Neuro:  - Q4 neuro checks  - S/P 200 Vimpat, Continue 100 BID. Neurology following   - EEG - P  - MRI/A when able  - Pain control PRN, avoid oversedation    Cardio:  - -140  - PRN Hydral/labetalol    Resp:  - RA  - Maintain O2 sats > 92 %    GI:  - Regular diet  - Bowel reg  - Vit C/MVI    :  - Voiding      Heme: Fe def anemia   - DVT PPx: SCDS, discuss SQL  - Cont ferrous sulfate    ID:  S/P Fever   - Currently Afeb  - UA neg , check Blood cultuyres x2 if fever >/38.3  - Monitor off ABx, trend WBCs  - procalcitonin- .14    Endo: Hypothyroid ; Hyponatremia  TSH - 0.79  - Cont Synthroid  - Repaet Na if decreased NaCl tabs + free H20 restriction INTERVAL HPI/OVERNIGHT EVENTS:  89 y/o female transferred to [facility] from [facility]  where she presented s/p trip and fall, denied LOC. Images at [facility] revealed right intraparenchymal hemorrhage prompting transfer to [facility] for neurosurgical evaluation & management. Patient was admitted to the NSICU for clinical observation. Repeat CT scans were notable for increased IPH which subsequently stabilized on last CT scan this morning . Patient's neurological exam on presentation was been normal with baseline left facial secondary to history of bell's palsy. There was a mild LUE drift noted intermittently on exam. During morning rounds, patient was cleared for a transfer in level of care to stepdown.      pt was noted to have a change in exam, dysarthric, expressive aphasia, intermittent receptive aphasia, antigravity B/L UE and B/L UE, intermittently following exam, answering questions "yes," "no," and stating her name. Code stroke called, NIH 13 upgraded to NSICU, Keppra changed to Vimpat, EEG ordered    [facility]      Vital Signs Last 24 Hrs  T(C): 36.3 (30 May 2022 08:00), Max: 37.4 (29 May 2022 16:01)  T(F): 97.3 (30 May 2022 08:00), Max: 99.3 (29 May 2022 16:01)  HR: 82 (30 May 2022 09:00) (63 - 91)  BP: 103/43 (30 May 2022 09:00) (96/47 - 135/88)  BP(mean): 62 (30 May 2022 09:00) (60 - 116)  RR: 19 (30 May 2022 09:00) (15 - 29)  SpO2: 99% (30 May 2022 09:00) (96% - 100%)      LABS:                        8.3    8.51  )-----------( 157      ( 30 May 2022 02:22 )             26.1     05-30    134<L>  |  99  |  20.0  ----------------------------<  171<H>  4.0   |  23.0  |  0.54    Ca    8.2<L>      30 May 2022 02:22  Phos  2.8       Mg     2.0         TPro  5.9<L>  /  Alb  3.1<L>  /  TBili  0.7  /  DBili  x   /  AST  21  /  ALT  35<H>  /  AlkPhos  99      PT/INR - ( 28 May 2022 21:45 )   PT: 15.0 sec;   INR: 1.29 ratio         PTT - ( 28 May 2022 21:45 )  PTT:27.3 sec  Urinalysis Basic - ( 29 May 2022 01:09 )    Color: Yellow / Appearance: Clear / S.020 / pH: x  Gluc: x / Ketone: Trace  / Bili: Negative / Urobili: Negative mg/dL   Blood: x / Protein: Negative / Nitrite: Negative   Leuk Esterase: Trace / RBC: 0-2 /HPF / WBC 0-2 /HPF   Sq Epi: x / Non Sq Epi: Occasional / Bacteria: x       @ 07: @ 07:00  --------------------------------------------------------  IN: 1887.5 mL / OUT: 1200 mL / NET: 687.5 mL     @ 07: @ 09:38  --------------------------------------------------------  IN: 125 mL / OUT: 0 mL / NET: 125 mL      A/P:   89y Female PMH DM, HLD, hypothyroidism, presented to [facility] s/p trip and fall without LOC, found with right occipital IPH.   -Pt stable from NSICU standpoint for downgrade. Pt signed out to medical team.     Neuro:  - Q4 neuro checks  - S/P 200 Vimpat, Continue 100 BID. Neurology following   - EEG - P  - MRI/A when able  - Pain control PRN, avoid oversedation    Cardio:  - -140  - PRN Hydral/labetalol    Resp:  - RA  - Maintain O2 sats > 92 %    GI:  - Regular diet  - Bowel reg  - Vit C/MVI    :  - Voiding      Heme: Fe def anemia   - DVT PPx: SCDS, discuss SQL  - Cont ferrous sulfate    ID:  S/P Fever   - Currently Afeb  - UA neg , check Blood cultuyres x2 if fever >/38.3  - Monitor off ABx, trend WBCs  - procalcitonin- .14    Endo: Hypothyroid ; Hyponatremia  TSH - 0.79  - Cont Synthroid  - Repaet Na if decreased NaCl tabs + free H20 restriction    _______________________________________________    ATTENDING'S ATTESTATION    89y Female PMH DM, HLD, hypothyroidism, presented with right occipital IPH. Returned to ICU recently for sz-like activity.  On Vimpat, cont; neg EEG so far  clinically stable for transfer to the floor.  rest as above    Time spent 35 min, non-critical, included review of relevant history, clinical examination, review of data and images, discussion of treatment with the multidisciplinary team and any consultants involved in this patient’s care.

## 2022-05-30 NOTE — EEG REPORT - NS EEG TEXT BOX
[facility]   COMPREHENSIVE EPILEPSY CENTER   REPORT OF CONTINUOUS VIDEO EEG     [facility]: [address], [place] [address], Ph#: [phone]  [facility]: [address], [place] [address], Ph#: [phone]  [facility]: [address] [place] [address], Ph#: [phone]    Patient Name: ELLI FLEMING  Age and : 89y (25)  MRN #: [id number]  Location: [place] [address]  Referring Physician: Agus Aleman    Start Time/Date: 08:00 on 22  End Time/Date: 08:00 on 22  Duration: 19 hours 38 mins    _____________________________________________________________  STUDY INFORMATION    EEG Recording Technique:  The patient underwent continuous Video-EEG monitoring, using Telemetry System hardware on the XLTek Digital System. EEG and video data were stored on a computer hard drive with important events saved in digital archive files. The material was reviewed by a physician (electroencephalographer / epileptologist) on a daily basis. Paul and seizure detection algorithms were utilized and reviewed. An EEG Technician attended to the patient, and was available throughout daytime work hours.  The epilepsy center neurologist was available in person or on call 24-hours per day.    EEG Placement and Labeling of Electrodes:  The EEG was performed utilizing 20 channel referential EEG connections (coronal over temporal over parasagittal montage) using all standard 10-20 electrode placements with EKG, with additional electrodes placed in the inferior temporal region using the modified 10-10 montage electrode placements for elective admissions, or if deemed necessary. Recording was at a sampling rate of 256 samples per second per channel. Time synchronized digital video recording was done simultaneously with EEG recording. A low light infrared camera was used for low light recording.     _____________________________________________________________  HISTORY    Patient is a 89y old  Female who presents with a chief complaint of transfer from [facility], s/p fall with [facility] (30 May 2022 00:07)      PERTINENT MEDICATION:  lacosamide 100 milliGRAM(s) Oral two times a day  _____________________________________________________________  STUDY INTERPRETATION    Findings: The background was continuous and reactive. During wakefulness, the posterior dominant rhythm consisted of symmetric, poorly-modulated 6.5Hz activity, with amplitude to 30 uV, that attenuated to eye opening.      Background Slowing:  Diffuse continuous irregular theta and polymorphic delta slowing.    Focal Slowing:   Continuous polymorphic theta/delta slowing over the right parietal region.    Sleep Background:  Drowsiness was characterized by fragmentation, attenuation, and slowing of the background activity.    Sleep was characterized by the presence of vertex waves, symmetric sleep spindles and K-complexes.    Other Non-Epileptiform Findings:  None were present.    Interictal Epileptiform Activity:   Abundant generalized periodic discharges at 1Hz, at times with shifting asymmetries and triphasic morphology.    Events:  Clinical events: None recorded.  Seizures: None recorded.    Activation Procedures:   Hyperventilation was not performed.    Photic stimulation was performed and did not elicit any abnormality.     Artifacts:  Intermittent myogenic and movement artifacts were noted.    ECG:  The heart rate on single channel ECG was predominantly between xx BPM.    _____________________________________________________________  EEG SUMMARY/CLASSIFICATION    Abnormal EEG in an encephalopathic patient.    - Abundant generalized periodic discharges at 1Hz, at times with shifting asymmetries and triphasic morphology.  - Continuous polymorphic theta/delta slowing over the right parietal region.  - Background slowing, generalized.    _____________________________________________________________  EEG IMPRESSION/CLINICAL CORRELATE    Abnormal EEG study.    - Presence of generalized periodic discharges (GPDs) with triphasic morphology is indicative of severe metabolic encephalopathy, but can be considered epileptiform in the right clinical context.  - Structural abnormality in the right parietal region.  - Moderate nonspecific diffuse or multifocal cerebral dysfunction.   - No seizures were recorded.    *** PRELIMINARY REPORT - PENDING EPILEPSY ATTENDING REVIEW ***  _____________________________________________________________    Vikram Lopez MD, DARCIE  Fellow, [facility] Epilepsy [place]     [facility]   COMPREHENSIVE EPILEPSY CENTER   REPORT OF CONTINUOUS VIDEO EEG     [facility]: [address], [place] [address], Ph#: [phone]  [facility]: [address], [place] [address], Ph#: [phone]  [facility]: [address] [place] [address], Ph#: [phone]    Patient Name: ELLI FLEMING  Age and : 89y (25)  MRN #: [id number]  Location: [place] [address]  Referring Physician: Agus Aleman    Start Time/Date: 08:00 on 22  End Time/Date: 08:00 on 22  Duration: 19 hours 38 mins    _____________________________________________________________  STUDY INFORMATION    EEG Recording Technique:  The patient underwent continuous Video-EEG monitoring, using Telemetry System hardware on the XLTek Digital System. EEG and video data were stored on a computer hard drive with important events saved in digital archive files. The material was reviewed by a physician (electroencephalographer / epileptologist) on a daily basis. Paul and seizure detection algorithms were utilized and reviewed. An EEG Technician attended to the patient, and was available throughout daytime work hours.  The epilepsy center neurologist was available in person or on call 24-hours per day.    EEG Placement and Labeling of Electrodes:  The EEG was performed utilizing 20 channel referential EEG connections (coronal over temporal over parasagittal montage) using all standard 10-20 electrode placements with EKG, with additional electrodes placed in the inferior temporal region using the modified 10-10 montage electrode placements for elective admissions, or if deemed necessary. Recording was at a sampling rate of 256 samples per second per channel. Time synchronized digital video recording was done simultaneously with EEG recording. A low light infrared camera was used for low light recording.     _____________________________________________________________  HISTORY    Patient is a 89y old  Female who presents with a chief complaint of transfer from [facility], [place]/p fall with [facility] (30 May 2022 00:07)      PERTINENT MEDICATION:  lacosamide 100 milliGRAM(s) Oral two times a day  _____________________________________________________________  STUDY INTERPRETATION    Findings: The background was continuous and reactive. During wakefulness, the posterior dominant rhythm consisted of symmetric, poorly-modulated 6.5Hz activity, with amplitude to 30 uV, that attenuated to eye opening.      Background Slowing:  Diffuse continuous irregular theta and polymorphic delta slowing.    Focal Slowing:   Continuous polymorphic theta/delta slowing over the right parietal region.    Sleep Background:  Drowsiness was characterized by fragmentation, attenuation, and slowing of the background activity.    Sleep was characterized by the presence of vertex waves, symmetric sleep spindles and K-complexes.    Other Non-Epileptiform Findings:  None were present.    Interictal Epileptiform Activity:   Abundant generalized periodic discharges at 1Hz, at times with shifting asymmetries and triphasic morphology.    Events:  Clinical events: None recorded.  Seizures: None recorded.    Activation Procedures:   Hyperventilation was not performed.    Photic stimulation was performed and did not elicit any abnormality.     Artifacts:  Intermittent myogenic and movement artifacts were noted.    ECG:  The heart rate on single channel ECG was predominantly between xx BPM.    _____________________________________________________________  EEG SUMMARY/CLASSIFICATION    Abnormal EEG in an encephalopathic patient.    - Abundant generalized periodic discharges at 1Hz, at times with shifting asymmetries and triphasic morphology.  - Continuous polymorphic theta/delta slowing over the right parietal region.  - Background slowing, generalized.    _____________________________________________________________  EEG IMPRESSION/CLINICAL CORRELATE    Abnormal EEG study.    - Presence of generalized periodic discharges (GPDs) with triphasic morphology is indicative of severe metabolic encephalopathy, but can be considered epileptiform in the right clinical context. Improving burden in the last quarter of recording.  - Structural abnormality in the right parietal region.  - Moderate nonspecific diffuse or multifocal cerebral dysfunction.   - No seizures were recorded.    *** PRELIMINARY REPORT - PENDING EPILEPSY ATTENDING REVIEW ***  _____________________________________________________________    Vikram Lopez MD, DARCIE  Fellow, [facility] Epilepsy [place]     [facility]   COMPREHENSIVE EPILEPSY CENTER   REPORT OF CONTINUOUS VIDEO EEG     [facility]: [address], [place] [address], Ph#: [phone]  [facility]: [address], [place] [address], Ph#: [phone]  [facility]: [address] [place] [address], Ph#: [phone]    Patient Name: ELLI FLEMING  Age and : 89y (25)  MRN #: [id number]  Location: [place] [address]  Referring Physician: Agus Aleman    Start Time/Date: 08:00 on 22  End Time/Date: 08:00 on 22  Duration: 19 hours 38 mins    _____________________________________________________________  STUDY INFORMATION    EEG Recording Technique:  The patient underwent continuous Video-EEG monitoring, using Telemetry System hardware on the XLTek Digital System. EEG and video data were stored on a computer hard drive with important events saved in digital archive files. The material was reviewed by a physician (electroencephalographer / epileptologist) on a daily basis. Paul and seizure detection algorithms were utilized and reviewed. An EEG Technician attended to the patient, and was available throughout daytime work hours.  The epilepsy center neurologist was available in person or on call 24-hours per day.    EEG Placement and Labeling of Electrodes:  The EEG was performed utilizing 20 channel referential EEG connections (coronal over temporal over parasagittal montage) using all standard 10-20 electrode placements with EKG, with additional electrodes placed in the inferior temporal region using the modified 10-10 montage electrode placements for elective admissions, or if deemed necessary. Recording was at a sampling rate of 256 samples per second per channel. Time synchronized digital video recording was done simultaneously with EEG recording. A low light infrared camera was used for low light recording.     _____________________________________________________________  HISTORY    Patient is a 89y old  Female who presents with a chief complaint of transfer from [facility], s/p fall with [facility] (30 May 2022 00:07)      PERTINENT MEDICATION:  lacosamide 100 milliGRAM(s) Oral two times a day  _____________________________________________________________  STUDY INTERPRETATION    Findings: The background was continuous and reactive. During wakefulness, the posterior dominant rhythm consisted of symmetric, poorly-modulated 6.5Hz activity, with amplitude to 30 uV, that attenuated to eye opening.      Background Slowing:  Diffuse continuous irregular theta and polymorphic delta slowing.    Focal Slowing:   Continuous polymorphic theta/delta slowing over the right parietal region.    Sleep Background:  Drowsiness was characterized by fragmentation, attenuation, and slowing of the background activity.    Sleep was characterized by the presence of vertex waves, symmetric sleep spindles and K-complexes.    Other Non-Epileptiform Findings:  None were present.    Interictal Epileptiform Activity:   Abundant generalized periodic discharges at 1Hz, at times with shifting asymmetries and triphasic morphology.    Events:  Clinical events: None recorded.  Seizures: None recorded.    Activation Procedures:   Hyperventilation was not performed.    Photic stimulation was performed and did not elicit any abnormality.     Artifacts:  Intermittent myogenic and movement artifacts were noted.    ECG:  The heart rate on single channel ECG was predominantly between xx BPM.    _____________________________________________________________  EEG SUMMARY/CLASSIFICATION    Abnormal EEG in an encephalopathic patient.    - Abundant generalized periodic discharges at 1Hz, at times with shifting asymmetries and triphasic morphology.  - intermittent polymorphic theta/delta slowing over the right parietal region.  - Background slowing, generalized.    _____________________________________________________________  EEG IMPRESSION/CLINICAL CORRELATE    Abnormal EEG study.    - Presence of generalized periodic discharges (GPDs) with triphasic morphology is indicative of severe metabolic encephalopathy, but can be considered epileptiform in the right clinical context. Improving burden in the last quarter of recording.  - functional abnormality in the right parietal region.  - Moderate nonspecific diffuse or multifocal cerebral dysfunction.   - No seizures were recorded.  _____________________________________________________________    Vikramstephanie Lopez MD, DARCIE  Fellow, [facility] Epilepsy Center    Domenic Murcia MD PhD  Director, Epilepsy Division, [facility] EEG Reading Room Ph#: [phone]  Epilepsy Answering Service after 5PM and before 8:30AM: Ph#: [phone]  [facility]   COMPREHENSIVE EPILEPSY CENTER   REPORT OF CONTINUOUS VIDEO EEG     [facility]: [address], [place] [address], Ph#: [phone]  [facility]: [address], [place] [address], Ph#: [phone]  [facility]: [address] [place] [address], Ph#: [phone]    Patient Name: ELLI FLEMING  Age and : 89y (25)  MRN #: [id number]  Location: [place] [address]  Referring Physician: Agus Aleman    Start Time/Date: 08:00 on 22  End Time/Date: 11:19 on 22  Duration: 22 hours 57 mins    _____________________________________________________________  STUDY INFORMATION    EEG Recording Technique:  The patient underwent continuous Video-EEG monitoring, using Telemetry System hardware on the XLTek Digital System. EEG and video data were stored on a computer hard drive with important events saved in digital archive files. The material was reviewed by a physician (electroencephalographer / epileptologist) on a daily basis. Paul and seizure detection algorithms were utilized and reviewed. An EEG Technician attended to the patient, and was available throughout daytime work hours.  The epilepsy center neurologist was available in person or on call 24-hours per day.    EEG Placement and Labeling of Electrodes:  The EEG was performed utilizing 20 channel referential EEG connections (coronal over temporal over parasagittal montage) using all standard 10-20 electrode placements with EKG, with additional electrodes placed in the inferior temporal region using the modified 10-10 montage electrode placements for elective admissions, or if deemed necessary. Recording was at a sampling rate of 256 samples per second per channel. Time synchronized digital video recording was done simultaneously with EEG recording. A low light infrared camera was used for low light recording.     _____________________________________________________________  HISTORY    Patient is a 89y old  Female who presents with a chief complaint of transfer from [facility], s/p fall with [facility] (30 May 2022 00:07)      PERTINENT MEDICATION:  lacosamide 100 milliGRAM(s) Oral two times a day  _____________________________________________________________  STUDY INTERPRETATION    Findings: The background was continuous and reactive. During wakefulness, the posterior dominant rhythm consisted of symmetric, poorly-modulated 6.5Hz activity, with amplitude to 30 uV, that attenuated to eye opening.      Background Slowing:  Diffuse continuous irregular theta and polymorphic delta slowing.    Focal Slowing:   Continuous polymorphic theta/delta slowing over the right parietal region.    Sleep Background:  Drowsiness was characterized by fragmentation, attenuation, and slowing of the background activity.    Sleep was characterized by the presence of vertex waves, symmetric sleep spindles and K-complexes.    Other Non-Epileptiform Findings:  None were present.    Interictal Epileptiform Activity:   Abundant generalized periodic discharges at 1Hz, at times with shifting asymmetries and triphasic morphology.    Events:  Clinical events: None recorded.  Seizures: None recorded.    Activation Procedures:   Hyperventilation was not performed.    Photic stimulation was performed and did not elicit any abnormality.     Artifacts:  Intermittent myogenic and movement artifacts were noted.    ECG:  The heart rate on single channel ECG was predominantly between xx BPM.    _____________________________________________________________  EEG SUMMARY/CLASSIFICATION    Abnormal EEG in an encephalopathic patient.    - Abundant generalized periodic discharges at 1Hz, at times with shifting asymmetries and triphasic morphology.  - intermittent polymorphic theta/delta slowing over the right parietal region.  - Background slowing, generalized.    _____________________________________________________________  EEG IMPRESSION/CLINICAL CORRELATE    Abnormal EEG study.    - Presence of generalized periodic discharges (GPDs) with triphasic morphology is indicative of severe metabolic encephalopathy, but can be considered epileptiform in the right clinical context. Improving burden in the last quarter of recording.  - functional abnormality in the right parietal region.  - Moderate nonspecific diffuse or multifocal cerebral dysfunction.   - No seizures were recorded.  _____________________________________________________________    Vikramstephanie Lopez MD, DARCIE  Fellow, [facility] Epilepsy Center    Domenic Murcia MD PhD  Director, Epilepsy Division, [facility] EEG Reading Room Ph#: [phone]  Epilepsy Answering Service after 5PM and before 8:30AM: Ph#: [phone]

## 2022-05-30 NOTE — CONSULT NOTE ADULT - REASON FOR ADMISSION
R. Parietal [facility]
transfer from [facility], s/p fall with [facility]

## 2022-05-30 NOTE — CONSULT NOTE ADULT - SUBJECTIVE AND OBJECTIVE BOX
CHIEF COMPLAINT/INTERVAL HISTORY:    Patient is a 89y old  Female who presents with a chief complaint of transfer from [facility], s/p fall with [facility] (30 May 2022 00:07)      HPI:  Patient is a 89 y/o female with history of DM, HLD, independent at baseline and still drives who was transferred from [facility] after sustaining a mechanical fall and hitting the back of her head on her dresser. Patient reports her foot got caught on her bed and she fell. Denies LOC. Imaging at [place] revealed a right intraparenchymal hemorrhage prompting transfer to [facility] for neurosurgical evaluation & management. On arrival patient with no acute complaints. Reports she takes a baby aspirin daily. No other reported anticoagulation / antiplatelet use. CXR & pelvis XR performed which were negative for any acute traumatic findings. CT cervical spine without acute findings, degenerative changes noted. Neurosurgery was consulted and patient was monitored in the ICU. Repeat imaging revealed stable bleed. Patient was downgraded on  but had an unresponsive episode concerning for seizure vs possible CVA. Patient was upgraded and neurology was consulted. Patient's keppra was switched to vimpat and she was placed on a VEEG. Neuro recommended MRI to exclude a CVA. Patient is now stable for downgrade to the hospitalist service for q4 neurochecks.     SUBJECTIVE & OBJECTIVE: Pt seen and examined at bedside. Complaining of constipation and arthritic pain from not being mobile. Otherwise, denies dizziness, lightheadedness or significant headache.     ROS: No chest pain, palpitations, SOB, light headedness, dizziness, headache, nausea/vomiting, fevers/chills, abdominal pain, dysuria.    ICU Vital Signs Last 24 Hrs  T(C): 36.7 (30 May 2022 12:14), Max: 37.4 (29 May 2022 16:01)  T(F): 98 (30 May 2022 12:14), Max: 99.3 (29 May 2022 16:01)  HR: 94 (30 May 2022 12:14) (63 - 94)  BP: 112/69 (30 May 2022 12:14) (96/47 - 135/88)  BP(mean): 70 (30 May 2022 11:00) (60 - 116)  RR: 18 (30 May 2022 12:14) (15 - 26)  SpO2: 100% (30 May 2022 12:14) (96% - 100%)    MEDICATIONS  (STANDING):  ascorbic acid 500 milliGRAM(s) Oral daily  chlorhexidine 2% Cloths 1 Application(s) Topical daily  dextrose 5%. 1000 milliLiter(s) (100 mL/Hr) IV Continuous <Continuous>  dextrose 5%. 1000 milliLiter(s) (50 mL/Hr) IV Continuous <Continuous>  dextrose 50% Injectable 25 Gram(s) IV Push once  dextrose 50% Injectable 12.5 Gram(s) IV Push once  dextrose 50% Injectable 25 Gram(s) IV Push once  enoxaparin Injectable 40 milliGRAM(s) SubCutaneous <User Schedule>  ferrous    sulfate 325 milliGRAM(s) Oral daily  glucagon  Injectable 1 milliGRAM(s) IntraMuscular once  insulin lispro (ADMELOG) corrective regimen sliding scale   SubCutaneous three times a day before meals  insulin lispro (ADMELOG) corrective regimen sliding scale   SubCutaneous at bedtime  lacosamide 100 milliGRAM(s) Oral two times a day  levothyroxine 50 MICROGram(s) Oral daily  multivitamin 1 Tablet(s) Oral daily  polyethylene glycol 3350 17 Gram(s) Oral daily  senna 2 Tablet(s) Oral at bedtime    MEDICATIONS  (PRN):  acetaminophen     Tablet .. 650 milliGRAM(s) Oral every 6 hours PRN Temp greater or equal to 38C (100.4F), Mild Pain (1 - 3)  dextrose Oral Gel 15 Gram(s) Oral once PRN Blood Glucose LESS THAN 70 milliGRAM(s)/deciliter  hydrALAZINE Injectable 10 milliGRAM(s) IV Push every 2 hours PRN SBP > 140  labetalol Injectable 10 milliGRAM(s) IV Push every 2 hours PRN Systolic blood pressure > 140  ondansetron Injectable 4 milliGRAM(s) IV Push every 6 hours PRN Nausea and/or Vomiting      LABS:                        8.3    8.51  )-----------( 157      ( 30 May 2022 02:22 )             26.1     05-30    134<L>  |  99  |  20.0  ----------------------------<  171<H>  4.0   |  23.0  |  0.54    Ca    8.2<L>      30 May 2022 02:22  Phos  2.8     05-30  Mg     2.0     05-30    TPro  5.9<L>  /  Alb  3.1<L>  /  TBili  0.7  /  DBili  x   /  AST  21  /  ALT  35<H>  /  AlkPhos  99  05-28    PT/INR - ( 28 May 2022 21:45 )   PT: 15.0 sec;   INR: 1.29 ratio         PTT - ( 28 May 2022 21:45 )  PTT:27.3 sec  Urinalysis Basic - ( 29 May 2022 01:09 )    Color: Yellow / Appearance: Clear / S.020 / pH: x  Gluc: x / Ketone: Trace  / Bili: Negative / Urobili: Negative mg/dL   Blood: x / Protein: Negative / Nitrite: Negative   Leuk Esterase: Trace / RBC: 0-2 /HPF / WBC 0-2 /HPF   Sq Epi: x / Non Sq Epi: Occasional / Bacteria: x      PHYSICAL EXAM:    GENERAL: elderly female laying in bed, NAD  HEAD:  Atraumatic, Normocephalic  EYES: EOMI, PERRLA, conjunctiva and sclera clear  ENMT: Moist mucous membranes  NECK: Supple,    NERVOUS SYSTEM:  Alert & Oriented X3, Motor Strength 5/5 B/L upper and lower extremities   CHEST/LUNG: Clear to auscultation bilaterally; No rales, rhonchi, wheezing, or rubs  HEART: Regular rate and rhythm; + S1/S2  ABDOMEN: Soft, Nontender, obese; Bowel sounds present  EXTREMITIES:  no pedal edema       CHIEF COMPLAINT/INTERVAL HISTORY:    Patient is a 89y old  Female who presents with a chief complaint of [place] [facility] with [facility] (30 May 2022 00:07)      HPI:  Patient is a 89 y/o female with history of DM, HLD, Hypothyroidism, Arthritis, [place] Palsy in 1946, independent at baseline and still drives who was [place] [facility] [place] a mechanical fall and hitting the back of her head on her dresser. Patient reports her foot got caught on her bed and she fell. Denies LOC. Imaging at [place] revealed a right intraparenchymal hemorrhage prompting transfer to [facility] for neurosurgical evaluation & management. On arrival patient with no acute complaints. Reports she takes a baby aspirin daily. No other reported anticoagulation / antiplatelet use. CXR & pelvis XR performed which were negative for any acute traumatic findings. CT cervical spine without acute findings, degenerative changes noted. Neurosurgery was consulted and patient was monitored in the ICU. Repeat imaging revealed stable bleed. Patient was downgraded on  but had an unresponsive episode concerning for seizure vs possible CVA. Patient was upgraded and neurology was consulted. Patient's keppra was switched to vimpat and she was placed on a VEEG. Neuro recommended MRI to exclude a CVA. Patient is now stable for downgrade to the hospitalist service for q4 neurochecks.     SUBJECTIVE & OBJECTIVE: Pt seen and examined at bedside. Complaining of constipation and arthritic pain from not being mobile. Otherwise, denies dizziness, lightheadedness or significant headache.     ROS: No chest pain, palpitations, SOB, light headedness, dizziness, headache, nausea/vomiting, fevers/chills, abdominal pain, dysuria.    PSH - Appendectomy, Cholecystectomy, Hysterectomy, Hip surgery, Lysis of adhesions  Social Hx - Denies tobacco/ETOH/illicit drug use  Fam Hx - DM  Allergies - Codeine, IV contrast, Celebrex    ICU Vital Signs Last 24 Hrs  T(C): 36.7 (30 May 2022 12:14), Max: 37.4 (29 May 2022 16:01)  T(F): 98 (30 May 2022 12:14), Max: 99.3 (29 May 2022 16:01)  HR: 94 (30 May 2022 12:14) (63 - 94)  BP: 112/69 (30 May 2022 12:14) (96/47 - 135/88)  BP(mean): 70 (30 May 2022 11:00) (60 - 116)  RR: 18 (30 May 2022 12:14) (15 - 26)  SpO2: 100% (30 May 2022 12:14) (96% - 100%)    MEDICATIONS  (STANDING):  ascorbic acid 500 milliGRAM(s) Oral daily  chlorhexidine 2% Cloths 1 Application(s) Topical daily  dextrose 5%. 1000 milliLiter(s) (100 mL/Hr) IV Continuous <Continuous>  dextrose 5%. 1000 milliLiter(s) (50 mL/Hr) IV Continuous <Continuous>  dextrose 50% Injectable 25 Gram(s) IV Push once  dextrose 50% Injectable 12.5 Gram(s) IV Push once  dextrose 50% Injectable 25 Gram(s) IV Push once  enoxaparin Injectable 40 milliGRAM(s) SubCutaneous <User Schedule>  ferrous    sulfate 325 milliGRAM(s) Oral daily  glucagon  Injectable 1 milliGRAM(s) IntraMuscular once  insulin lispro (ADMELOG) corrective regimen sliding scale   SubCutaneous three times a day before meals  insulin lispro (ADMELOG) corrective regimen sliding scale   SubCutaneous at bedtime  lacosamide 100 milliGRAM(s) Oral two times a day  levothyroxine 50 MICROGram(s) Oral daily  multivitamin 1 Tablet(s) Oral daily  polyethylene glycol 3350 17 Gram(s) Oral daily  senna 2 Tablet(s) Oral at bedtime    MEDICATIONS  (PRN):  acetaminophen     Tablet .. 650 milliGRAM(s) Oral every 6 hours PRN Temp greater or equal to 38C (100.4F), Mild Pain (1 - 3)  dextrose Oral Gel 15 Gram(s) Oral once PRN Blood Glucose LESS THAN 70 milliGRAM(s)/deciliter  hydrALAZINE Injectable 10 milliGRAM(s) IV Push every 2 hours PRN SBP > 140  labetalol Injectable 10 milliGRAM(s) IV Push every 2 hours PRN Systolic blood pressure > 140  ondansetron Injectable 4 milliGRAM(s) IV Push every 6 hours PRN Nausea and/or Vomiting      LABS:                        8.3    8.51  )-----------( 157      ( 30 May 2022 02:22 )             26.1     05-30    134<L>  |  99  |  20.0  ----------------------------<  171<H>  4.0   |  23.0  |  0.54    Ca    8.2<L>      30 May 2022 02:22  Phos  2.8     05-30  Mg     2.0     05-30    TPro  5.9<L>  /  Alb  3.1<L>  /  TBili  0.7  /  DBili  x   /  AST  21  /  ALT  35<H>  /  AlkPhos  99  05-28    PT/INR - ( 28 May 2022 21:45 )   PT: 15.0 sec;   INR: 1.29 ratio         PTT - ( 28 May 2022 21:45 )  PTT:27.3 sec  Urinalysis Basic - ( 29 May 2022 01:09 )    Color: Yellow / Appearance: Clear / S.020 / pH: x  Gluc: x / Ketone: Trace  / Bili: Negative / Urobili: Negative mg/dL   Blood: x / Protein: Negative / Nitrite: Negative   Leuk Esterase: Trace / RBC: 0-2 /HPF / WBC 0-2 /HPF   Sq Epi: x / Non Sq Epi: Occasional / Bacteria: x      PHYSICAL EXAM:    GENERAL: elderly female laying in bed, NAD  HEAD:  Atraumatic, Normocephalic  EYES: EOMI, PERRLA, conjunctiva and sclera clear  ENMT: Moist mucous membranes  NECK: Supple,    NERVOUS SYSTEM:  Alert & Oriented X3, Motor Strength 5/5 B/L upper and lower extremities   CHEST/LUNG: Clear to auscultation bilaterally; No rales, rhonchi, wheezing, or rubs  HEART: Regular rate and rhythm; + S1/S2  ABDOMEN: Soft, Nontender, obese; Bowel sounds present  EXTREMITIES:  no pedal edema

## 2022-05-30 NOTE — PROGRESS NOTE ADULT - SUBJECTIVE AND OBJECTIVE BOX
Neurology Progress Note  [facility] at [place] [address] [place] [address]  [phone]    Interval History:  No acute overnight events. on 5/28/22, patient had an episode of altered mental status with stable CT head w/o contrast. She was connected to continuous EEG and switched from Keppra to Vimpat 100mg bid and is tolerating this well. She is pending MRI brain w/wo contrast and MRA head w/o contrast to r/o underlying lesion/amyloid.    MEDICATIONS  (STANDING):  ascorbic acid 500 milliGRAM(s) Oral daily  chlorhexidine 2% Cloths 1 Application(s) Topical daily  dextrose 5%. 1000 milliLiter(s) (100 mL/Hr) IV Continuous <Continuous>  dextrose 5%. 1000 milliLiter(s) (50 mL/Hr) IV Continuous <Continuous>  dextrose 50% Injectable 25 Gram(s) IV Push once  dextrose 50% Injectable 12.5 Gram(s) IV Push once  dextrose 50% Injectable 25 Gram(s) IV Push once  enoxaparin Injectable 40 milliGRAM(s) SubCutaneous <User Schedule>  ferrous    sulfate 325 milliGRAM(s) Oral daily  glucagon  Injectable 1 milliGRAM(s) IntraMuscular once  insulin lispro (ADMELOG) corrective regimen sliding scale   SubCutaneous three times a day before meals  insulin lispro (ADMELOG) corrective regimen sliding scale   SubCutaneous at bedtime  lacosamide 100 milliGRAM(s) Oral two times a day  levothyroxine 50 MICROGram(s) Oral daily  multivitamin 1 Tablet(s) Oral daily  polyethylene glycol 3350 17 Gram(s) Oral daily  senna 2 Tablet(s) Oral at bedtime      Physical Exam:  Vital Signs Last 24 Hrs  T(C): 36.4 (30 May 2022 15:15), Max: 37 (29 May 2022 20:01)  T(F): 97.6 (30 May 2022 15:15), Max: 98.6 (29 May 2022 20:01)  HR: 96 (30 May 2022 15:15) (63 - 96)  BP: 114/58 (30 May 2022 15:15) (96/47 - 135/88)  BP(mean): 70 (30 May 2022 11:00) (60 - 116)  RR: 18 (30 May 2022 15:15) (15 - 26)  SpO2: 100% (30 May 2022 15:15) (96% - 100%)  General: no acute distress  HEENT:NC/AT  Lungs: no respiratory distress  Skin: no rash or ecchymoses  Lower extremities: no edema    Mental Status: AAO x 3, no dysarthria, no aphasia  CN: PERRL, EOMI, VFF, V1-V3 sensation intact, no gross facial asymmetry  Motor:  moves all extremities at least 4/5 throughout  Sensory: intact to light touch throughout  Coordination: no dysmetria on FTN  Gait: deferred      CT head w/o contrast - stable right parieto-occipital hemorrhage      LABS:  cret                        8.3    8.51  )-----------( 157      ( 30 May 2022 02:22 )             26.1     05-30    134<L>  |  99  |  20.0  ----------------------------<  171<H>  4.0   |  23.0  |  0.54    Ca    8.2<L>      30 May 2022 02:22  Phos  2.8     05-30  Mg     2.0     05-30    TPro  5.9<L>  /  Alb  3.1<L>  /  TBili  0.7  /  DBili  x   /  AST  21  /  ALT  35<H>  /  AlkPhos  99  05-28    PT/INR - ( 28 May 2022 21:45 )   PT: 15.0 sec;   INR: 1.29 ratio         PTT - ( 28 May 2022 21:45 )  PTT:27.3 sec    continuous EEG 5/29/22- 5/30/22   EEG IMPRESSION/CLINICAL CORRELATE    Abnormal EEG study.    - Presence of generalized periodic discharges (GPDs) with triphasic morphology is indicative of severe metabolic encephalopathy, but can be considered epileptiform in the right clinical context. Improving burden in the last quarter of recording.  - functional abnormality in the right parietal region.  - Moderate nonspecific diffuse or multifocal cerebral dysfunction.   - No seizures were recorded.

## 2022-05-30 NOTE — CONSULT NOTE ADULT - ASSESSMENT
Patient is a 89 y/o female with history of DM, HLD, Hypothyroidism, independent at baseline and still drives who was transferred from [facility] for a right IP hemorrhage after sustaining a mechanical fall and hitting the back of her head. Hospital course complicated by an acute episode of altered mentation and aphasia on 5/28 for which VEEG and MRI were ordered. Patient is now AAO x 4 and stable for downgrade to the hospitalist service for further management.    1. Traumatic Right Posterior Parietal/Occipital IPH  -downgrade to the hospitalist service   -neurochecks 4 hours  -repeat CT head on 5/28 with stable bleed  -cleared to start DVT ppx today  -vimpat for seizure prophylaxis (switched from keppra)  -fall precautions  -PT evaluation   -Neurosurgery on board; will follow up further recommendations    2. Episode of AMS + aphasia - resolved  -unclear etiology - seizure vs TIA/CVA  -code stroke on 5/28 but not a candidate for tPA given IPH  -VEEG ongoing; no epileptiform activity thus far  -continue vimpat  -seizure precautions  -MRI/MRA pending  -neuro on board, but epilepsy team consulted by Neuro ICU team    3. DM  -check HbA1c  -start ISS  -hold Natiglinide while inpatient  -may need to add weight based insulin based on fingersticks  -ADA diet    4. HLD  -continue lipitor    5. Hypothyroidism  -check TSH  -continue synthroid    -not a candidate for antiplatelets given    DVT ppx - Lovenox (cleared by NSG today) Patient is a 89 y/o female with history of DM, HLD, Hypothyroidism, Arthritis, independent at baseline and still drives who was transferred from [facility] for a right IP hemorrhage after sustaining a mechanical fall and hitting the back of her head. Hospital course complicated by an acute episode of altered mentation and aphasia on 5/28 for which VEEG and MRI were ordered. Patient is now AAO x 4 and stable for downgrade to the hospitalist service for further management.    1. Traumatic Right Posterior Parietal/Occipital IPH  -downgrade to the hospitalist service   -neurochecks 4 hours  -repeat CT head on 5/28 with stable bleed  -cleared to start DVT ppx today  -vimpat for seizure prophylaxis (switched from keppra)  -fall precautions  -PT evaluation   -Neurosurgery on board; will follow up further recommendations    2. Episode of AMS + aphasia - resolved  -unclear etiology - seizure vs TIA/CVA  -code stroke on 5/28 but not a candidate for tPA given IPH  -VEEG ongoing; no epileptiform activity thus far  -continue vimpat  -seizure precautions  -MRI/MRA pending  -neuro on board, but epilepsy team consulted by Neuro ICU team    3. DM  -check HbA1c  -start ISS  -hold Natiglinide while inpatient  -may need to add weight based insulin based on fingersticks  -ADA diet    4. HLD  -continue lipitor    5. Hypothyroidism  -check TSH  -continue synthroid    6. Constipation  -BM this afternoon  -continue senna/miralax    7. Anemia likely due to iron deficiency iron  -Hb 8.3 today  -iron studies reviewed  -continue ferrous sulfate and MVI  -monitor CBC  -outpatient hem evaluation for further work up    8. Mild Hyponatremia  -likely due to volume depletion  -sodium improved from 130 --> 134 s/p NS  -urine studies on 5/29 reviewed but patient was on saline   -liberalize sodium in diet  -monitor I/Os  -repeat BMP on 5/31    10. Arthritis  -complaining of stiffness  -continue tylenol as needed; avoid narcotics  -PT evaluation; OOB to chair    DVT ppx - Lovenox (cleared by NSG today)

## 2022-05-31 LAB
ANION GAP SERPL CALC-SCNC: 9 MMOL/L — SIGNIFICANT CHANGE UP (ref 5–17)
BUN SERPL-MCNC: 19.2 MG/DL — SIGNIFICANT CHANGE UP (ref 8–20)
CALCIUM SERPL-MCNC: 8.3 MG/DL — LOW (ref 8.6–10.2)
CHLORIDE SERPL-SCNC: 99 MMOL/L — SIGNIFICANT CHANGE UP (ref 98–107)
CO2 SERPL-SCNC: 27 MMOL/L — SIGNIFICANT CHANGE UP (ref 22–29)
CREAT SERPL-MCNC: 0.52 MG/DL — SIGNIFICANT CHANGE UP (ref 0.5–1.3)
EGFR: 85 ML/MIN/1.73M2 — SIGNIFICANT CHANGE UP
GLUCOSE BLDC GLUCOMTR-MCNC: 141 MG/DL — HIGH (ref 70–99)
GLUCOSE BLDC GLUCOMTR-MCNC: 146 MG/DL — HIGH (ref 70–99)
GLUCOSE BLDC GLUCOMTR-MCNC: 171 MG/DL — HIGH (ref 70–99)
GLUCOSE BLDC GLUCOMTR-MCNC: 172 MG/DL — HIGH (ref 70–99)
GLUCOSE BLDC GLUCOMTR-MCNC: 181 MG/DL — HIGH (ref 70–99)
GLUCOSE SERPL-MCNC: 116 MG/DL — HIGH (ref 70–99)
HCT VFR BLD CALC: 24.7 % — LOW (ref 34.5–45)
HGB BLD-MCNC: 8 G/DL — LOW (ref 11.5–15.5)
MAGNESIUM SERPL-MCNC: 2 MG/DL — SIGNIFICANT CHANGE UP (ref 1.6–2.6)
MCHC RBC-ENTMCNC: 30.1 PG — SIGNIFICANT CHANGE UP (ref 27–34)
MCHC RBC-ENTMCNC: 32.4 GM/DL — SIGNIFICANT CHANGE UP (ref 32–36)
MCV RBC AUTO: 92.9 FL — SIGNIFICANT CHANGE UP (ref 80–100)
PHOSPHATE SERPL-MCNC: 3.1 MG/DL — SIGNIFICANT CHANGE UP (ref 2.4–4.7)
PLATELET # BLD AUTO: 181 K/UL — SIGNIFICANT CHANGE UP (ref 150–400)
POTASSIUM SERPL-MCNC: 4.2 MMOL/L — SIGNIFICANT CHANGE UP (ref 3.5–5.3)
POTASSIUM SERPL-SCNC: 4.2 MMOL/L — SIGNIFICANT CHANGE UP (ref 3.5–5.3)
RBC # BLD: 2.66 M/UL — LOW (ref 3.8–5.2)
RBC # FLD: 15.3 % — HIGH (ref 10.3–14.5)
SODIUM SERPL-SCNC: 135 MMOL/L — SIGNIFICANT CHANGE UP (ref 135–145)
TSH SERPL-MCNC: 1.68 UIU/ML — SIGNIFICANT CHANGE UP (ref 0.27–4.2)
WBC # BLD: 7.32 K/UL — SIGNIFICANT CHANGE UP (ref 3.8–10.5)
WBC # FLD AUTO: 7.32 K/UL — SIGNIFICANT CHANGE UP (ref 3.8–10.5)

## 2022-05-31 PROCEDURE — 99233 SBSQ HOSP IP/OBS HIGH 50: CPT

## 2022-05-31 PROCEDURE — 99232 SBSQ HOSP IP/OBS MODERATE 35: CPT

## 2022-05-31 RX ADMIN — CHLORHEXIDINE GLUCONATE 1 APPLICATION(S): 213 SOLUTION TOPICAL at 11:07

## 2022-05-31 RX ADMIN — Medication 650 MILLIGRAM(S): at 18:23

## 2022-05-31 RX ADMIN — SENNA PLUS 2 TABLET(S): 8.6 TABLET ORAL at 21:15

## 2022-05-31 RX ADMIN — Medication 1 TABLET(S): at 08:50

## 2022-05-31 RX ADMIN — POLYETHYLENE GLYCOL 3350 17 GRAM(S): 17 POWDER, FOR SOLUTION ORAL at 08:50

## 2022-05-31 RX ADMIN — LACOSAMIDE 100 MILLIGRAM(S): 50 TABLET ORAL at 05:18

## 2022-05-31 RX ADMIN — Medication 1: at 17:22

## 2022-05-31 RX ADMIN — ENOXAPARIN SODIUM 40 MILLIGRAM(S): 100 INJECTION SUBCUTANEOUS at 17:23

## 2022-05-31 RX ADMIN — Medication 650 MILLIGRAM(S): at 09:50

## 2022-05-31 RX ADMIN — Medication 500 MILLIGRAM(S): at 08:51

## 2022-05-31 RX ADMIN — Medication 325 MILLIGRAM(S): at 08:50

## 2022-05-31 RX ADMIN — LACOSAMIDE 100 MILLIGRAM(S): 50 TABLET ORAL at 17:27

## 2022-05-31 RX ADMIN — Medication 50 MICROGRAM(S): at 05:18

## 2022-05-31 RX ADMIN — Medication 650 MILLIGRAM(S): at 08:50

## 2022-05-31 RX ADMIN — PANTOPRAZOLE SODIUM 40 MILLIGRAM(S): 20 TABLET, DELAYED RELEASE ORAL at 05:18

## 2022-05-31 RX ADMIN — Medication 650 MILLIGRAM(S): at 17:23

## 2022-05-31 NOTE — PROGRESS NOTE ADULT - ASSESSMENT
MEDICARE WELLNESS VISIT NOTE      HISTORY OF PRESENT ILLNESS:   Lucho Jenkins presents for his Subsequent Annual Medicare Wellness Visit.   He has no current complaints or concerns.      Patient Care Team:  Reyna Ng MD as PCP - General (Family Practice)  Michael Garcia MD (Hematology & Oncology)  Román Cedeno MD (Nephrology)  Billy Adler MD as Cardiologist (Cardiovascular Disease)        Patient Active Problem List    Diagnosis Date Noted   • Valvular heart disease 08/18/2016     Priority: Low     August 2015 echo shows mild aortic and mitral regurgitation     • Myelodysplastic syndrome  03/14/2013     Priority: Low   • Idiopathic thrombocytopenic purpura 03/14/2013     Priority: Low   • Hypertension      Priority: Low   • Coronary artery disease      Priority: Low     Cardiac cath 2011 - totally occluded proximal LAD, moderate nonobstructive disease in the mid circumflex, mild nonobstructive disease within the ramus. LVEF  60%, mild mitral regurgitation . Echocardiogram May 2012 - mild to moderate aortic regurgitation, mild mitral regurgitation, mild to moderate pulmonary valve regurgitation. RVSP 40. 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     5/28 change in exam, stroke code activated, she was upgraded to the neuro ICU. downgraded on 5/30 to the medical service    1. On VEEG  2. MRI brain w/wo moses/MRA head ordered   3. Stroke neurology following  4. PT/OT following  5. PM& R following.  Colonoscopy diagnostic  07/22/2005   • Remv cataract extracap insert lens  07/07/2010     left    • Inguinal hernia repair  09/14/2009     left   • Inguinal hernia repair  01/01/1946     right   • Inguinal hernia repair  01/20/2006     redo right    • Transurethral elec-surg prostatectom  01/01/1987   • Laparoscopic hydrocelectomy  01/01/1991     left         Social History   Substance Use Topics   • Smoking status: Former Smoker     Packs/day: 0.30     Years: 20.00     Types: Cigarettes     Quit date: 2/27/1980   • Smokeless tobacco: Never Used   • Alcohol use No     Family History - Reviewed    Current Outpatient Prescriptions   Medication Sig Dispense Refill   • finasteride (PROSCAR) 5 MG tablet Take 1 tablet by mouth daily. 30 tablet 3   • nitroGLYcerin (NITRODUR) 0.4 MG/HR Remove patch 12 hours after applying 30 patch 3   • BYSTOLIC 2.5 MG tablet TAKE HALF TABLET BY MOUTH DAILY 15 tablet 10   • tamsulosin (FLOMAX) 0.4 MG Cap Take 1 capsule by mouth 2 times daily. 60 capsule 4   • Cholecalciferol 1000 UNITS capsule Take 1 capsule by mouth daily. 90 capsule 3   • mometasone (ELOCON) 0.1 % cream APPLY 1-2 TIMES A DAY TO AFFECTED AREA FOR A FEW DAYS UP TO 1- 2 WEEKS. THEN AS NEEDED. 15 g 1   • ferrous sulfate 325 (65 FE) MG tablet Take 325 mg by mouth daily (with breakfast).     • halobetasol (ULTRAVATE) 0.05 % cream Apply topically  Daily as necessary 50 g 0   • cyanocobalamin 1000 MCG/ML injection Inject 1,000 mcg into the muscle every 30 days.     • triamcinolone (ARISTOCORT) 0.1 % cream Apply topically 2 times daily. 30 g 2     Current Facility-Administered Medications   Medication Dose Route Frequency Provider Last Rate Last Dose   • cyanocobalamin injection 1,000 mcg  1,000 mcg Intramuscular Q30 Days Reyna Ng MD   1,000 mcg at 02/01/17 1130        Medicare Health Risk Assessment in electronic health record reviewed.  The following items were identified and addressed:    Falls risk   Hearing  concerns  Vision and hearing screens documented:  Yes    Advanced Directive: Patient has an Advance Directives document, which is not on file  Cognitive Assessment: no evidence of cognitive dysfunction by direct observation      Needed follow up:  None    See orders   See Patient Instructions section  No Follow-up on file.

## 2022-05-31 NOTE — PROGRESS NOTE ADULT - SUBJECTIVE AND OBJECTIVE BOX
Continues to reports head pain.   Placed heat pack instead.   Also asking for pain medication, RN aware.   Patient undergoing EEG.     REVIEW OF SYSTEMS  Constitutional - No fever,  +fatigue  HEENT - No vertigo, +neck pain  Neurological - +headaches, +loss of strength    FUNCTIONAL PROGRESS  5/26 PT  Bed Mobility: Rolling/Turning:     · Level of Ming[place]	independent    Bed Mobility: Sit to Supine:     · Level of Ming[place]	minimum assist (75% patients effort); bilateral LEs  · Physical Assist/Nonphysical Assist	1 person assist; verbal cues    Bed Mobility: Supine to Sit:     · Level of Mingus	moderate assist (50% patients effort)  · Physical Assist/Nonphysical Assist	1 person assist; verbal cues    Transfer: Bed to Chair:     Transfer Skill: Bed to Chair   · Level of Mingus	unable to perform; RN request return to bed    Transfer: Chair to Bed:     · Level of Mingus	unable to perform    Transfer: Sit to Stand:     · Level of [place]	minimum assist (75% patients effort)  · Physical Assist/Nonphysical Assist	1 person assist; verbal cues  · Weight-Bearing Restrictions	full weight-bearing  · Assistive Device	rolling walker    Transfer: Stand to Sit:     · Level of Mingus	minimum assist (75% patients effort)  · Physical Assist/Nonphysical Assist	1 person assist; verbal cues  · Weight-Bearing Restrictions	full weight-bearing  · Assistive Device	rolling walker    Gait Skills:     · Level of Ming[place]	minimum assist (75% patients effort)  · Physical Assist/Nonphysical Assist	verbal cues; 1 person assist  · Weight-Bearing Restrictions	full weight-bearing  · Assistive Device	rolling walker  · Gait Distance	10 feet; distance limited by lines at this time    Gait Analysis:     · Gait Pattern Used	2-point gait  · Gait Deviations Noted	decreased rajiv; decreased step length  · Impairments Contributing to Gait Deviations	impaired balance; impaired coordination; decreased flexibility; impaired motor control; decreased ROM; decreased strength    Stair Negotiation:     · Level of Ming[place]	unable to perform; Pt does not negotiate stairs at home    5/26 OT  Bathing Training:     · Level of Ming[place]	moderate assist (50% patients effort); to sponge bathe  · Physical Assist/Nonphysical Assist	1 person assist; verbal cues; set-up required    Upper Body Dressing Training:     · Level of Ming[place]	minimum assist (75% patients effort); seated to don/doff gown  · Physical Assist/Nonphysical Assist	1 person assist; verbal cues; set-up required    Lower Body Dressing Training:     · Level of Mingus	moderate assist (50% patients effort); to don/doff socks  · Physical Assist/Nonphysical Assist	1 person assist; verbal cues; set-up required    Toilet Hygiene Training:     · Level of Mingus	+Primafit at time of eval    Grooming Training:     · Level of Mingus	minimum assist (75% patients effort); seated  · Physical Assist/Nonphysical Assist	1 person assist; verbal cues; set-up required    Eating/Self-Feeding Training:     · Level of Mingus	Did not directly observe        VITALS  T(C): 36.8 (05-31-22 @ 05:09), Max: 36.8 (05-31-22 @ 05:09)  HR: 80 (05-31-22 @ 05:09) (77 - 96)  BP: 117/78 (05-31-22 @ 05:09) (103/43 - 121/62)  RR: 18 (05-31-22 @ 05:09) (18 - 21)  SpO2: 99% (05-31-22 @ 05:09) (99% - 100%)  Wt(kg): --    MEDICATIONS   acetaminophen     Tablet .. 650 milliGRAM(s) every 6 hours PRN  ascorbic acid 500 milliGRAM(s) daily  chlorhexidine 2% Cloths 1 Application(s) daily  dextrose 5%. 1000 milliLiter(s) <Continuous>  dextrose 5%. 1000 milliLiter(s) <Continuous>  dextrose 50% Injectable 25 Gram(s) once  dextrose 50% Injectable 12.5 Gram(s) once  dextrose 50% Injectable 25 Gram(s) once  dextrose Oral Gel 15 Gram(s) once PRN  enoxaparin Injectable 40 milliGRAM(s) <User Schedule>  ferrous    sulfate 325 milliGRAM(s) daily  glucagon  Injectable 1 milliGRAM(s) once  hydrALAZINE Injectable 10 milliGRAM(s) every 2 hours PRN  insulin lispro (ADMELOG) corrective regimen sliding scale   three times a day before meals  insulin lispro (ADMELOG) corrective regimen sliding scale   at bedtime  labetalol Injectable 10 milliGRAM(s) every 2 hours PRN  lacosamide 100 milliGRAM(s) two times a day  levothyroxine 50 MICROGram(s) daily  multivitamin 1 Tablet(s) daily  ondansetron Injectable 4 milliGRAM(s) every 6 hours PRN  pantoprazole    Tablet 40 milliGRAM(s) before breakfast  polyethylene glycol 3350 17 Gram(s) daily  senna 2 Tablet(s) at bedtime      RECENT LABS/IMAGING                          8.0    7.32  )-----------( 181      ( 31 May 2022 05:29 )             24.7     05-31    135  |  99  |  19.2  ----------------------------<  116<H>  4.2   |  27.0  |  0.52    Ca    8.3<L>      31 May 2022 05:29  Phos  3.1     05-31  Mg     2.0     05-31                  HEAD CT 5/25 - An approximately 2.7 x 2.2 x 3.2 cm right parietal intraparenchymal hemorrhage is slightly increased in size from 2.4 x 2 x 3.2 cm on 05/22/2022 at 3:13 PM and 2.2 x 1.2 x 2 cm and 05/25/2022 at 8:54 AM. Trace right parietal subarachnoid hemorrhage and trace subdural hemorrhage along the right cerebellar tentorium are stable.    HEAD CT 5/26 - No significant interval change from CT brain 5/25/2022 obtained at 10:19 PM    HEAD CT 5/28 - 1. Stable appearance of previously visualized right posterior parietal/occipital intraparenchymal hematoma. Mild surrounding edema. 2. No evidence of an acute large arterial distribution infarct.    CXR 5/28 - Unremarkable frontal chest x ray    EEG 5/30 - Abnormal EEG study.   - Presence of generalized periodic discharges (GPDs) with triphasic morphology is indicative of severe metabolic encephalopathy, but can be considered epileptiform in the right clinical context. Improving burden in the last quarter of recording.  - functional abnormality in the right parietal region.  - Moderate nonspecific diffuse or multifocal cerebral dysfunction.   - No seizures were recorded.  ----------------------------------------------------------------------------------------  PHYSICAL EXAM  Constitutional - NAD, Comfortable  Neck - Supple, +limited ROM due to neck/head pain  Extremities - No C/C/E, No calf tenderness   Neurologic Exam -                    Cognitive - AAO to self, place, PART situation     Communication - Fluent, No dysarthria     Cranial Nerves - Left peripheral droop - mild     Motor - No focal deficits       Sensory - Intact to LT  Psychiatric - Mood stable, Calm   ----------------------------------------------------------------------------------------  ASSESSMENT/PLAN  89yFemale with functional deficits after a mechanical fall sustaining an IPH  Right parietal IPH - Vimpat, MRA/MRI planned (R/O Amyloid vs Lesion), EEG   HTN - Hydralazine, Labetalol  Pain - Tylenol  DVT PPX - SCDs, Lovenox  Rehab - Patient pending additional workup. When medically optimized, expect will need AR pending ongoing functional progress.     Will continue to follow. Rehab recommendations are dependent on how functional progress changes as well as how patient continues to participate and tolerate therapeutic interventions, which may change. Recommend ongoing mobilization by staff to maintain cardiopulmonary function and prevention of secondary complications related to debility. Discussed with rehab team.

## 2022-05-31 NOTE — CHART NOTE - NSCHARTNOTEFT_GEN_A_CORE
Source: Patient [ x]  Family [ ]   other [ ]    Current Diet: Diet, Consistent Carbohydrate w/Evening Snack:   Supplement Feeding Modality:  Oral  Ensure Enlive Cans or Servings Per Day:  1       Frequency:  Three Times a day (05-26-22 @ 11:48)    PO intake:   50% [ ]   50-75%  [x ]   %  [ ]  other :    Current Weight:   (5/31) 124.5 lbs  (5/27) 125.4 lbs    % Weight Change - minimal wt change noted, will continue to monitor.  Aware pt with 1+ trace generalized edema noted per documentation.     Pertinent Medications: MEDICATIONS  (STANDING):  ascorbic acid 500 milliGRAM(s) Oral daily  chlorhexidine 2% Cloths 1 Application(s) Topical daily  dextrose 5%. 1000 milliLiter(s) (50 mL/Hr) IV Continuous <Continuous>  dextrose 5%. 1000 milliLiter(s) (100 mL/Hr) IV Continuous <Continuous>  dextrose 50% Injectable 25 Gram(s) IV Push once  dextrose 50% Injectable 12.5 Gram(s) IV Push once  dextrose 50% Injectable 25 Gram(s) IV Push once  enoxaparin Injectable 40 milliGRAM(s) SubCutaneous <User Schedule>  ferrous    sulfate 325 milliGRAM(s) Oral daily  glucagon  Injectable 1 milliGRAM(s) IntraMuscular once  insulin lispro (ADMELOG) corrective regimen sliding scale   SubCutaneous three times a day before meals  insulin lispro (ADMELOG) corrective regimen sliding scale   SubCutaneous at bedtime  lacosamide 100 milliGRAM(s) Oral two times a day  levothyroxine 50 MICROGram(s) Oral daily  multivitamin 1 Tablet(s) Oral daily  pantoprazole    Tablet 40 milliGRAM(s) Oral before breakfast  polyethylene glycol 3350 17 Gram(s) Oral daily  senna 2 Tablet(s) Oral at bedtime    MEDICATIONS  (PRN):  acetaminophen     Tablet .. 650 milliGRAM(s) Oral every 6 hours PRN Temp greater or equal to 38C (100.4F), Mild Pain (1 - 3)  dextrose Oral Gel 15 Gram(s) Oral once PRN Blood Glucose LESS THAN 70 milliGRAM(s)/deciliter  hydrALAZINE Injectable 10 milliGRAM(s) IV Push every 2 hours PRN SBP > 140  labetalol Injectable 10 milliGRAM(s) IV Push every 2 hours PRN Systolic blood pressure > 140  ondansetron Injectable 4 milliGRAM(s) IV Push every 6 hours PRN Nausea and/or Vomiting    Pertinent Labs: CBC Full  -  ( 31 May 2022 05:29 )  WBC Count : 7.32 K/uL  RBC Count : 2.66 M/uL  Hemoglobin : 8.0 g/dL  Hematocrit : 24.7 %  Platelet Count - Automated : 181 K/uL  Mean Cell Volume : 92.9 fl  Mean Cell Hemoglobin : 30.1 pg  Mean Cell Hemoglobin Concentration : 32.4 gm/dL  05-31 Na135 mmol/L Glu 116 mg/dL<H> K+ 4.2 mmol/L Cr  0.52 mg/dL BUN 19.2 mg/dL Phos 3.1 mg/dL Alb n/a   PAB n/a       Skin: no skin breakdown noted     Nutrition focused physical exam previously conducted - found signs of malnutrition [ ]absent [x ]present    Subcutaneous fat loss: [x ] Orbital fat pads region, [x ]Buccal fat region, [x ]Triceps region,  [ ]Ribs region    Muscle wasting: [ x]Temples region, [ x]Clavicle region, [x ]Shoulder region, [ ]Scapula region, [ ]Interosseous region,  [ ]thigh region, [ ]Calf region    Current Nutrition Diagnosis: Pt remains at nutrition risk secondary to moderate protein calorie malnutrition (chronic) related to inability to meet sufficient protein energy requirements in setting of advanced age admitted with worsening R. Parietal IPH as evidenced by pt with moderate muscle wasting/fat loss and meeting <75% estimated energy needs > 1 month.  Pt reports good appetite and fair/good po intake at meals.  Pt consumes Ensure supplement, but unable to say how many per day.  RD to remain available.     Recommendations:   1. Continue with Ensure TID  2. Continue with MVI, Vit C, feso4 daily  3. Obtain daily weight and monitor trend     Monitoring and Evaluation:   [ x] PO intake [x ] Tolerance to diet prescription [X] Weights  [X] Follow up per protocol [X] Labs:

## 2022-05-31 NOTE — PROGRESS NOTE ADULT - ASSESSMENT
Patient is a 89 y/o female with history of DM, HLD, Hypothyroidism, Arthritis, independent at baseline and still drives who was transferred from [facility] for a right IP hemorrhage after sustaining a mechanical fall and hitting the back of her head. Hospital course complicated by an acute episode of altered mentation and aphasia on 5/28 for which VEEG and MRI were ordered. Patient is now AAO x 4 and stable for downgrade to the hospitalist service for further management.    1. Traumatic Right Posterior Parietal/Occipital IPH  -neurochecks 4 hours  -repeat CT head on 5/28 with stable bleed  -MRI/ MRA pending  -cleared to start DVT ppx and same started  -Vimpat for seizure prophylaxis (switched from Keppra)  -fall precautions  -PT evaluation - AR. Rehab Med eval- following. No final decision yet  -Neurosurgery following    2. Episode of AMS + aphasia - resolved  -code stroke on 5/28 but not a candidate for tPA given IPH  -VEEG - negative for Seizures  -continue Vimpat for prophylaxis  -seizure precautions  -Neuro and Epilepsy following    3. DM2 controlled  -start ISS  -hold Natiglinide while inpatient  -may need to add weight based insulin based on fingersticks  -ADA diet    4. HLD  -continue lipitor    5. Hypothyroidism  -TSH WNL  -continue synthroid    6. Constipation- resolved  -continue senna/miralax    7. Anemia likely due to iron deficiency   -Hb 8.3 today  -iron studies reviewed  -continue ferrous sulfate and MVI  -monitor CBC  -outpatient hem evaluation     8. Mild Hyponatremia  -likely due to volume depletion  -sodium improved from 130 --> 134 s/p NS  -urine studies on 5/29 reviewed but patient was on saline   -liberalize sodium in diet  -monitor I/Os  -repeat BMP on 5/31- normalized    10. Arthritis  -X rays- OA  -continue tylenol as needed; avoid narcotics  -PT evaluation; OOB to chair    [facility]     Dispo- to Rehab after MRI/ MRA

## 2022-05-31 NOTE — PROGRESS NOTE ADULT - NS ATTEND AMEND GEN_ALL_CORE FT
NSGY Attg:    see above    patient seen and examined     agree with exam and plan as above
NSGY Attg:    see above    patient seen and examined    agree with exam and plan as above  MRI/A pending

## 2022-05-31 NOTE — PROGRESS NOTE ADULT - ASSESSMENT
89 yo woman with ICH and episode of AMS    ICH/AMS  Agree with MRI brain w/wo contrast and MRA head w/o contrast  Vascular neurology to follow for ICH  cEEG without seizures  Discontinue EEG  C/w vimpat 100mg bid    Follow up with Dr. Leslie as an outpatient    Thank you for allowing me to participate in this patient's care

## 2022-05-31 NOTE — EEG REPORT - NS EEG TEXT BOX
[facility]   COMPREHENSIVE EPILEPSY CENTER   REPORT OF CONTINUOUS VIDEO EEG     [facility]: [address], [place] [address], Ph#: [phone]  [facility]: [address], [place] [address], Ph#: [phone]  [facility]: [address] [place] [address], Ph#: [phone]    Patient Name: ELLI FLEMING  Age and : 89y (25)  MRN #: [id number]  Location: [place] [address]  Referring Physician: Agus Aleman    Start Time/Date: 08:00 on 22  End Time/Date: 08:00 on 22  Duration: 24 hours     _____________________________________________________________  STUDY INFORMATION    EEG Recording Technique:  The patient underwent continuous Video-EEG monitoring, using Telemetry System hardware on the XLTek Digital System. EEG and video data were stored on a computer hard drive with important events saved in digital archive files. The material was reviewed by a physician (electroencephalographer / epileptologist) on a daily basis. Paul and seizure detection algorithms were utilized and reviewed. An EEG Technician attended to the patient, and was available throughout daytime work hours.  The epilepsy center neurologist was available in person or on call 24-hours per day.    EEG Placement and Labeling of Electrodes:  The EEG was performed utilizing 20 channel referential EEG connections (coronal over temporal over parasagittal montage) using all standard 10-20 electrode placements with EKG, with additional electrodes placed in the inferior temporal region using the modified 10-10 montage electrode placements for elective admissions, or if deemed necessary. Recording was at a sampling rate of 256 samples per second per channel. Time synchronized digital video recording was done simultaneously with EEG recording. A low light infrared camera was used for low light recording.     _____________________________________________________________  HISTORY  Patient is a 89y old  Female who presents with a chief complaint of transfer from [facility], s/p fall with [facility] (30 May 2022 00:07)    PERTINENT MEDICATION:  lacosamide 100 milliGRAM(s) Oral two times a day  _____________________________________________________________  STUDY INTERPRETATION    Findings: The background was continuous and reactive. During wakefulness, the posterior dominant rhythm consisted of symmetric, poorly-modulated 6.5Hz activity, with amplitude to 30 uV, that attenuated to eye opening.      Background Slowing:  Diffuse continuous irregular theta and polymorphic delta slowing. Shifting    Focal Slowing:   None    Sleep Background:  Drowsiness was characterized by fragmentation, attenuation, and slowing of the background activity.    Sleep was characterized by the presence of vertex waves, symmetric sleep spindles and K-complexes.    Other Non-Epileptiform Findings:  None were present.    Interictal Epileptiform Activity:   Occasional intermittent GPDs up to 1Hz, at times with shifting asymmetries and triphasic morphology.    Events:  Clinical events: None recorded.  Seizures: None recorded.    Activation Procedures:   Hyperventilation was not performed.    Photic stimulation was performed and did not elicit any abnormality.     Artifacts:  Intermittent myogenic and movement artifacts were noted.    ECG:  The heart rate on single channel ECG was predominantly between 66-72BPM.    _____________________________________________________________  EEG SUMMARY/CLASSIFICATION  Abnormal EEG in an encephalopathic patient.  - Occasional intermittent GPDs up to 1Hz, at times with shifting asymmetries and triphasic morphology.  - Background slowing, generalized.    _____________________________________________________________  EEG IMPRESSION/CLINICAL CORRELATE  GPDs with triphasic morphology may be associated with an increased risk for seizure, but are typically seen in the context of a relatively severe metabolic encephalopathic process.  No seizures x 2 days.    In absence of additional clinical concerns, recommend consideration for discontinuation of current EEG study with reconnection in future if warranted.    General recommendations for CEEG/LTM duration:  1 Day recording if patient awake and no epileptiform abnormalities recorded.  2 Day recording if patient comatose and no epileptiform abnormalities recorded.  2-3 Day recording if patient comatose and epileptiform abnormalities recorded, with no seizures recorded.  Discontinuation of recording if patient with epileptiform abnormalities or seizures initially on recording, and no subsequent seizures recorded x 1-2 Days.    MD KARENA HernandezES  Director, Continuous EEG Monitoring Program, [facility]   and Epilepsy Fellowship ,   Department of Neurology, [facility]    [facility] EEG Reading Room Ph#: [phone]  Epilepsy Answering Service after 5PM and before 8:30AM: Ph#: [phone]   [facility]   COMPREHENSIVE EPILEPSY CENTER   REPORT OF CONTINUOUS VIDEO EEG     [facility]: [address], [place] [address], Ph#: [phone]  [facility]: [address] [place] [address], Ph#: [phone]  [facility]: [address] [place] [address], Ph#: [phone]    Patient Name: ELLI FLEMING  Age and : 89y (25)  MRN #: [id number]  Location: [place] [address]  Referring Physician: Agus Aleman    Start Time/Date: 08:00 - 16:00 on 22  End Time/Date: 25: on 22  Duration: 18:50 hours     _____________________________________________________________  STUDY INFORMATION    EEG Recording Technique:  The patient underwent continuous Video-EEG monitoring, using Telemetry System hardware on the XLTek Digital System. EEG and video data were stored on a computer hard drive with important events saved in digital archive files. The material was reviewed by a physician (electroencephalographer / epileptologist) on a daily basis. Paul and seizure detection algorithms were utilized and reviewed. An EEG Technician attended to the patient, and was available throughout daytime work hours.  The epilepsy center neurologist was available in person or on call 24-hours per day.    EEG Placement and Labeling of Electrodes:  The EEG was performed utilizing 20 channel referential EEG connections (coronal over temporal over parasagittal montage) using all standard 10-20 electrode placements with EKG, with additional electrodes placed in the inferior temporal region using the modified 10-10 montage electrode placements for elective admissions, or if deemed necessary. Recording was at a sampling rate of 256 samples per second per channel. Time synchronized digital video recording was done simultaneously with EEG recording. A low light infrared camera was used for low light recording.     _____________________________________________________________  HISTORY  Patient is a 89y old  Female who presents with a chief complaint of transfer from [facility], s/p fall with [facility] (30 May 2022 00:07)    PERTINENT MEDICATION:  lacosamide 100 milliGRAM(s) Oral two times a day  _____________________________________________________________  STUDY INTERPRETATION    Findings: The background was continuous and reactive. During wakefulness, the posterior dominant rhythm consisted of symmetric, poorly-modulated 6.5Hz activity, with amplitude to 30 uV, that attenuated to eye opening.      Background Slowing:  Diffuse continuous irregular theta and polymorphic delta slowing. Shifting    Focal Slowing:   Delta slightly more prominent right posteriorly    Sleep Background:  Drowsiness was characterized by fragmentation, attenuation, and slowing of the background activity.    Sleep was characterized by the presence of vertex waves, symmetric sleep spindles and K-complexes.    Other Non-Epileptiform Findings:  None were present.    Interictal Epileptiform Activity:   Occasional intermittent GPDs up to 1Hz, at times with shifting asymmetries and triphasic morphology.    Events:  Clinical events: None recorded.  Seizures: None recorded.    Activation Procedures:   Hyperventilation was not performed.    Photic stimulation was performed and did not elicit any abnormality.     Artifacts:  Intermittent myogenic and movement artifacts were noted.    ECG:  The heart rate on single channel ECG was predominantly between 66-72BPM.    _____________________________________________________________  EEG SUMMARY/CLASSIFICATION  Abnormal EEG in an encephalopathic patient.  - Occasional intermittent GPDs up to 1Hz, at times with shifting asymmetries and triphasic morphology.  - Delta slightly more prominent right posteriorly  - Background slowing, generalized.    _____________________________________________________________  EEG IMPRESSION/CLINICAL CORRELATE  GPDs with triphasic morphology may be associated with an increased risk for seizure, but are typically seen in the context of a relatively moderate to severe metabolic encephalopathic process.    Right sided cerebral dysfunction, subte.  No seizures x 2 days.  In absence of additional clinical concerns, recommend consideration for discontinuation of current EEG study with reconnection in future if warranted.    General recommendations for CEEG/LTM duration:  1 Day recording if patient awake and no epileptiform abnormalities recorded.  2 Day recording if patient comatose and no epileptiform abnormalities recorded.  2-3 Day recording if patient comatose and epileptiform abnormalities recorded, with no seizures recorded.  Discontinuation of recording if patient with epileptiform abnormalities or seizures initially on recording, and no subsequent seizures recorded x 1-2 Days.    Anil Mayfield MD FAJOSE E  Director, Continuous EEG Monitoring Program, [facility] and [facility]   and Epilepsy Fellowship ,   Department of Neurology, [facility] [employer]    [facility] EEG Reading Room Ph#: [phone]  Epilepsy Answering Service after 5PM and before 8:30AM: Ph#: [phone]   [facility]   COMPREHENSIVE EPILEPSY CENTER   REPORT OF CONTINUOUS VIDEO EEG     [facility]: [address], [place] [address], Ph#: [phone]  [facility]: [address] [place] [address], Ph#: [phone]  [facility]: [address] [place] [address], Ph#: [phone]    Patient Name: ELLI FLEMING  Age and : 89y (25)  MRN #: [id number]  Location: [place] [address]  Referring Physician: Agus Aleman    Start Time/Date: 08:00 - 16:00 on 22  End Time/Date: 25: on 22  Duration: 18:50 hours     _____________________________________________________________  STUDY INFORMATION    EEG Recording Technique:  The patient underwent continuous Video-EEG monitoring, using Telemetry System hardware on the XLTek Digital System. EEG and video data were stored on a computer hard drive with important events saved in digital archive files. The material was reviewed by a physician (electroencephalographer / epileptologist) on a daily basis. Paul and seizure detection algorithms were utilized and reviewed. An EEG Technician attended to the patient, and was available throughout daytime work hours.  The epilepsy center neurologist was available in person or on call 24-hours per day.    EEG Placement and Labeling of Electrodes:  The EEG was performed utilizing 20 channel referential EEG connections (coronal over temporal over parasagittal montage) using all standard 10-20 electrode placements with EKG, with additional electrodes placed in the inferior temporal region using the modified 10-10 montage electrode placements for elective admissions, or if deemed necessary. Recording was at a sampling rate of 256 samples per second per channel. Time synchronized digital video recording was done simultaneously with EEG recording. A low light infrared camera was used for low light recording.     _____________________________________________________________  HISTORY  Patient is a 89y old  Female who presents with a chief complaint of transfer from [facility], s/p fall with [facility] (30 May 2022 00:07)    PERTINENT MEDICATION:  lacosamide 100 milliGRAM(s) Oral two times a day  _____________________________________________________________  STUDY INTERPRETATION    Findings: The background was continuous and reactive. During wakefulness, the posterior dominant rhythm consisted of symmetric, poorly-modulated 6.5Hz activity, with amplitude to 30 uV, that attenuated to eye opening.      Background Slowing:  Diffuse continuous irregular theta and polymorphic delta slowing. Shifting    Focal Slowing:   Delta slightly more prominent right posteriorly    Sleep Background:  Drowsiness was characterized by fragmentation, attenuation, and slowing of the background activity.    Sleep was characterized by the presence of vertex waves, symmetric sleep spindles and K-complexes.    Other Non-Epileptiform Findings:  None were present.    Interictal Epileptiform Activity:   Occasional intermittent GPDs up to 1Hz, at times with shifting asymmetries and triphasic morphology.    Events:  Clinical events: None recorded.  Seizures: None recorded.    Activation Procedures:   Hyperventilation was not performed.    Photic stimulation was performed and did not elicit any abnormality.     Artifacts:  Intermittent myogenic and movement artifacts were noted.    ECG:  The heart rate on single channel ECG was predominantly between 66-72BPM.    _____________________________________________________________  EEG SUMMARY/CLASSIFICATION  Abnormal EEG in an encephalopathic patient.  - Occasional intermittent GPDs up to 1Hz, at times with shifting asymmetries and triphasic morphology.  Less frequent vs prior day.  - Delta slightly more prominent right posteriorly  - Background slowing, generalized.    _____________________________________________________________  EEG IMPRESSION/CLINICAL CORRELATE  GPDs with triphasic morphology may be associated with an increased risk for seizure, but are typically seen in the context of a relatively moderate to severe metabolic encephalopathic process.    GPDs less prominent vs prior day.  Right sided cerebral dysfunction, subte.  No seizures x 2 days.  In absence of additional clinical concerns, recommend consideration for discontinuation of current EEG study with reconnection in future if warranted.    General recommendations for CEEG/LTM duration:  1 Day recording if patient awake and no epileptiform abnormalities recorded.  2 Day recording if patient comatose and no epileptiform abnormalities recorded.  2-3 Day recording if patient comatose and epileptiform abnormalities recorded, with no seizures recorded.  Discontinuation of recording if patient with epileptiform abnormalities or seizures initially on recording, and no subsequent seizures recorded x 1-2 Days.    Anil Mayfield MD FAES  Director, Continuous EEG Monitoring Program, [facility]   and Epilepsy Fellowship ,   Department of Neurology, [facility] [employer] [facility] [employer]    [facility] EEG Reading Room Ph#: [phone]  Epilepsy Answering Service after 5PM and before 8:30AM: Ph#: [phone]   [facility]   COMPREHENSIVE EPILEPSY CENTER   REPORT OF CONTINUOUS VIDEO EEG     [facility]: [address], [place] [address], Ph#: [phone]  [facility]: [address], [place] [address], Ph#: [phone]  [facility]: [address] [place] [address], Ph#: [phone]    Patient Name: ELLI FLEMING  Age and : 89y (25)  MRN #: [id number]  Location: [place] [address]  Referring Physician: Agus Aleman    Start Time/Date: : - 23:13 on 22  End Time/Date: 25: on 22  Duration: 24 hours     _____________________________________________________________  STUDY INFORMATION    EEG Recording Technique:  The patient underwent continuous Video-EEG monitoring, using Telemetry System hardware on the XLTek Digital System. EEG and video data were stored on a computer hard drive with important events saved in digital archive files. The material was reviewed by a physician (electroencephalographer / epileptologist) on a daily basis. Paul and seizure detection algorithms were utilized and reviewed. An EEG Technician attended to the patient, and was available throughout daytime work hours.  The epilepsy center neurologist was available in person or on call 24-hours per day.    EEG Placement and Labeling of Electrodes:  The EEG was performed utilizing 20 channel referential EEG connections (coronal over temporal over parasagittal montage) using all standard 10-20 electrode placements with EKG, with additional electrodes placed in the inferior temporal region using the modified 10-10 montage electrode placements for elective admissions, or if deemed necessary. Recording was at a sampling rate of 256 samples per second per channel. Time synchronized digital video recording was done simultaneously with EEG recording. A low light infrared camera was used for low light recording.     _____________________________________________________________  HISTORY  Patient is a 89y old  Female who presents with a chief [place] [facility] with [facility] (30 May 2022 00:07)    PERTINENT MEDICATION:  lacosamide 100 milliGRAM(s) Oral two times a day  _____________________________________________________________  STUDY INTERPRETATION    Findings: The background was continuous and reactive. During wakefulness, the posterior dominant rhythm consisted of symmetric, poorly-modulated 6.5Hz activity, with amplitude to 30 uV, that attenuated to eye opening.      Background Slowing:  Diffuse continuous irregular theta and polymorphic delta slowing. Shifting    Focal Slowing:   Delta slightly more prominent right posteriorly    Sleep Background:  Drowsiness was characterized by fragmentation, attenuation, and slowing of the background activity.    Sleep was characterized by the presence of vertex waves, symmetric sleep spindles and K-complexes.    Other Non-Epileptiform Findings:  None were present.    Interictal Epileptiform Activity:   Occasional intermittent GPDs up to 1Hz, at times with shifting asymmetries and triphasic morphology.    Events:  Clinical events: None recorded.  Seizures: None recorded.    Activation Procedures:   Hyperventilation was not performed.    Photic stimulation was performed and did not elicit any abnormality.     Artifacts:  Intermittent myogenic and movement artifacts were noted.    ECG:  The heart rate on single channel ECG was predominantly between 66-72BPM.    _____________________________________________________________  EEG SUMMARY/CLASSIFICATION  Abnormal EEG in an encephalopathic patient.  - Occasional intermittent GPDs up to 1Hz, at times with shifting asymmetries and triphasic morphology.  Less frequent vs prior day.  - Delta slightly more prominent right posteriorly  - Background slowing, generalized.    _____________________________________________________________  EEG IMPRESSION/CLINICAL CORRELATE  GPDs with triphasic morphology may be associated with an increased risk for seizure, but are typically seen in the context of a relatively moderate to severe metabolic encephalopathic process.    GPDs less prominent vs prior day.  Right sided cerebral dysfunction, subte.  No seizures x 2 days.  In absence of additional clinical concerns, recommend consideration for discontinuation of current EEG study with reconnection in future if warranted.    General recommendations for CEEG/LTM duration:  1 Day recording if patient awake and no epileptiform abnormalities recorded.  2 Day recording if patient comatose and no epileptiform abnormalities recorded.  2-3 Day recording if patient comatose and epileptiform abnormalities recorded, with no seizures recorded.  Discontinuation of recording if patient with epileptiform abnormalities or seizures initially on recording, and no subsequent seizures recorded x 1-2 Days.    MD DAINA Hernandez  Director, Continuous EEG Monitoring Program, [facility]   and Epilepsy Fellowship ,   Department of Neurology, [facility] [employer]    [facility] EEG Reading Room Ph#: [phone]  Epilepsy Answering Service after 5PM and before 8:30AM: Ph#: [phone]

## 2022-05-31 NOTE — PROGRESS NOTE ADULT - SUBJECTIVE AND OBJECTIVE BOX
HPI: Patient is a 89 y/o female transfer from [facility] s/p trip and fall. Imaging studies at [place] revealed right intraparenchymal hemorrhage prompting transfer to [facility] for neurosurgical evaluation & management. On arrival patient with no acute complaints. States she tripped and fell, no loss of consciousness, reports she takes a baby aspirin daily. No other reported anticoagulation / antiplatelet use. Airway: intact, c-collar in place on arrival. Breathing: breath sounds CTA b/l, no accessory muscle use, no conversational dyspnea. Circulation: 2+ central & peripheral pulses b/l. Disability: pupils 2mm round and reactive to light b/l, GCS15. Exposure: fully exposed and covered w/ warm blankets. CXR & pelvis XR performed without obvious acute traumatic findings. PIV placed by RN and labs drawn. istat pH 7.45, base excess +5, Hgb 9.9. (Hgb 10.3 on blood work from [facility]).    5/28 pt was noted to have a change in exam, dysarthric, expressive aphasia, intermittent receptive aphasia, antigravity B/L UE and B/L UE, intermittently following exam, answering questions "yes," "no," and stating her name. Code stroke called, NIH 13 upgraded to NSICU, Keppra changed to Vimpat, EEG ordered   5/30 Pt downgraded to medicine from NSICU    INTERVAL OVERNIGHT EVENTS: 5/31 No overnight events      89y Female seen lying comfortably in bed. Tolerating diet. On VEEG. C/o shoulder pain       Vital Signs Last 24 Hrs  T(C): 36.6 (31 May 2022 08:34), Max: 36.8 (31 May 2022 05:09)  T(F): 97.9 (31 May 2022 08:34), Max: 98.3 (31 May 2022 05:09)  HR: 82 (31 May 2022 08:34) (80 - 96)  BP: 126/74 (31 May 2022 08:34) (112/69 - 126/74)  BP(mean): --  RR: 18 (31 May 2022 08:34) (18 - 18)  SpO2: 98% (31 May 2022 08:34) (98% - 100%)    PHYSICAL EXAM:  GENERAL: NAD, well-groomed, well-developed  HEAD:  Atraumatic, normocephalic  MENTAL STATUS: Awake, alert, oriented x3, Appropriately conversant without aphasia,  following simple commands  CRANIAL NERVES:  EOMI without nystagmus. Face symmetric w/ normal eye closure and smile, tongue midline. Hearing grossly intact. Speech clear. Head turning and shoulder shrug intact.   MOTOR: DOWNS x4 anti-gravity  SENSATION: grossly intact to light touch all extremities      LABS:                        8.0    7.32  )-----------( 181      ( 31 May 2022 05:29 )             24.7     05-31    135  |  99  |  19.2  ----------------------------<  116<H>  4.2   |  27.0  |  0.52    Ca    8.3<L>      31 May 2022 05:29  Phos  3.1     05-31  Mg     2.0     05-31 05-30 @ 07:01 - 05-31 @ 07:00  --------------------------------------------------------  IN: 727.5 mL / OUT: 700 mL / NET: 27.5 mL    05-31 @ 07:01  -  05-31 @ 11:36  --------------------------------------------------------  IN: 240 mL / OUT: 400 mL / NET: -160 mL        RADIOLOGY & ADDITIONAL TESTS:    CT Head No Cont (05.28.22 @ 18:23)     1. Stable appearance of previously visualized right posterior   parietal/occipital intraparenchymal hematoma. Mild surrounding edema.  2. No evidence of an acute large arterial distribution infarct.          CAPRINI SCORE [CLOT]:  Patient has an estimated Caprini score of greater than 5.  However, the patient's unique clinical situation will be addressed in an individual manner to determine appropriate anticoagulation treatment, if any.

## 2022-05-31 NOTE — PROGRESS NOTE ADULT - SUBJECTIVE AND OBJECTIVE BOX
Neurology Progress Note  [facility] [place] [address] [place] [address]  [phone]    Interval History:  No acute overnight events.     MEDICATIONS  (STANDING):  ascorbic acid 500 milliGRAM(s) Oral daily  chlorhexidine 2% Cloths 1 Application(s) Topical daily  dextrose 5%. 1000 milliLiter(s) (50 mL/Hr) IV Continuous <Continuous>  dextrose 5%. 1000 milliLiter(s) (100 mL/Hr) IV Continuous <Continuous>  dextrose 50% Injectable 25 Gram(s) IV Push once  dextrose 50% Injectable 12.5 Gram(s) IV Push once  dextrose 50% Injectable 25 Gram(s) IV Push once  enoxaparin Injectable 40 milliGRAM(s) SubCutaneous <User Schedule>  ferrous    sulfate 325 milliGRAM(s) Oral daily  glucagon  Injectable 1 milliGRAM(s) IntraMuscular once  insulin lispro (ADMELOG) corrective regimen sliding scale   SubCutaneous three times a day before meals  insulin lispro (ADMELOG) corrective regimen sliding scale   SubCutaneous at bedtime  lacosamide 100 milliGRAM(s) Oral two times a day  levothyroxine 50 MICROGram(s) Oral daily  multivitamin 1 Tablet(s) Oral daily  pantoprazole    Tablet 40 milliGRAM(s) Oral before breakfast  polyethylene glycol 3350 17 Gram(s) Oral daily  senna 2 Tablet(s) Oral at bedtime    MEDICATIONS  (PRN):  acetaminophen     Tablet .. 650 milliGRAM(s) Oral every 6 hours PRN Temp greater or equal to 38C (100.4F), Mild Pain (1 - 3)  dextrose Oral Gel 15 Gram(s) Oral once PRN Blood Glucose LESS THAN 70 milliGRAM(s)/deciliter  hydrALAZINE Injectable 10 milliGRAM(s) IV Push every 2 hours PRN SBP > 140  labetalol Injectable 10 milliGRAM(s) IV Push every 2 hours PRN Systolic blood pressure > 140  ondansetron Injectable 4 milliGRAM(s) IV Push every 6 hours PRN Nausea and/or Vomiting        Physical Exam:  Vital Signs Last 24 Hrs  T(C): 36.6 (31 May 2022 08:34), Max: 36.8 (31 May 2022 05:09)  T(F): 97.9 (31 May 2022 08:34), Max: 98.3 (31 May 2022 05:09)  HR: 83 (31 May 2022 12:00) (80 - 89)  BP: 123/64 (31 May 2022 12:00) (117/78 - 126/74)  BP(mean): --  RR: 18 (31 May 2022 12:00) (18 - 18)  SpO2: 98% (31 May 2022 12:00) (98% - 100%)  General: no acute distress  HEENT:NC/AT  Lungs: no respiratory distress  Skin: no rash or ecchymoses  Lower extremities: no edema    Mental Status: AAO x 3, no dysarthria, no aphasia  CN: PERRL, EOMI, VFF, V1-V3 sensation intact, decreased nasolabial fold on left  Motor:  moves all extremities at least 4/5 throughout  Sensory: intact to light touch throughout  Coordination: no dysmetria on FTN  Gait: deferred      CT head w/o contrast - stable right parieto-occipital hemorrhage      LABS:  cret                        8.3    8.51  )-----------( 157      ( 30 May 2022 02:22 )             26.1     05-30    134<L>  |  99  |  20.0  ----------------------------<  171<H>  4.0   |  23.0  |  0.54    Ca    8.2<L>      30 May 2022 02:22  Phos  2.8     05-30  Mg     2.0     05-30    TPro  5.9<L>  /  Alb  3.1<L>  /  TBili  0.7  /  DBili  x   /  AST  21  /  ALT  35<H>  /  AlkPhos  99  05-28    PT/INR - ( 28 May 2022 21:45 )   PT: 15.0 sec;   INR: 1.29 ratio         PTT - ( 28 May 2022 21:45 )  PTT:27.3 sec    continuous EEG 5/29/22- 5/30/22   EEG IMPRESSION/CLINICAL CORRELATE    Abnormal EEG study.    - Presence of generalized periodic discharges (GPDs) with triphasic morphology is indicative of severe metabolic encephalopathy, but can be considered epileptiform in the right clinical context. Improving burden in the last quarter of recording.  - functional abnormality in the right parietal region.  - Moderate nonspecific diffuse or multifocal cerebral dysfunction.   - No seizures were recorded.    continuous EEG 5/30/22- 5/31/22  EEG SUMMARY/CLASSIFICATION  Abnormal EEG in an encephalopathic patient.  - Occasional intermittent GPDs up to 1Hz, at times with shifting asymmetries and triphasic morphology.  Less frequent vs prior day.  - Delta slightly more prominent right posteriorly  - Background slowing, generalized.    _____________________________________________________________  EEG IMPRESSION/CLINICAL CORRELATE  GPDs with triphasic morphology may be associated with an increased risk for seizure, but are typically seen in the context of a relatively moderate to severe metabolic encephalopathic process.    GPDs less prominent vs prior day.  Right sided cerebral dysfunction, subtle.  No seizures x 2 days.  In absence of additional clinical concerns, recommend consideration for discontinuation of current EEG study with reconnection in future if warranted.

## 2022-05-31 NOTE — PROGRESS NOTE ADULT - SUBJECTIVE AND OBJECTIVE BOX
HEALTH ISSUES - PROBLEM Dx:  history of DM, HLD, Hypothyroidism, Arthritis, [place] Palsy in 1946,    fall--> SDH    INTERVAL HPI/ OVERNIGHT EVENTS:    lying in bed  fully cognitive and comprehending and conversing  no focal deficits noted  knows her meds and is questioning them  EEG ongoing with plans for removal today  Family at bedside    REVIEW OF SYSTEMS:    as above    Vital Signs Last 24 Hrs  T(C): 36.6 (31 May 2022 15:10), Max: 36.8 (31 May 2022 05:09)  T(F): 97.8 (31 May 2022 15:10), Max: 98.3 (31 May 2022 05:09)  HR: 83 (31 May 2022 15:10) (80 - 89)  BP: 121/70 (31 May 2022 15:10) (117/78 - 126/74)  BP(mean): --  RR: 18 (31 May 2022 15:10) (18 - 18)  SpO2: 98% (31 May 2022 15:10) (98% - 100%)    PHYSICAL EXAM-  GENERAL: elderly female laying in bed, Comfortable no distress  HEAD:  Atraumatic, Normocephalic  EYES: EOMI, PERRLA, conjunctiva and sclera clear  ENMT: Moist mucous membranes  NECK: Supple,    NERVOUS SYSTEM:  Alert & Oriented X 3, Motor Strength 5/5 B/L upper and lower extremities, speech, language intact  CHEST/LUNG: Clear to auscultation bilaterally; No rales, rhonchi, wheezing, or rubs  HEART: Regular rate and rhythm; + S1/S2  ABDOMEN: Soft, Nontender, obese; Bowel sounds present  EXTREMITIES:  no pedal edema    MEDICATIONS  (STANDING):  ascorbic acid 500 milliGRAM(s) Oral daily  chlorhexidine 2% Cloths 1 Application(s) Topical daily  dextrose 5%. 1000 milliLiter(s) (50 mL/Hr) IV Continuous <Continuous>  dextrose 5%. 1000 milliLiter(s) (100 mL/Hr) IV Continuous <Continuous>  dextrose 50% Injectable 25 Gram(s) IV Push once  dextrose 50% Injectable 12.5 Gram(s) IV Push once  dextrose 50% Injectable 25 Gram(s) IV Push once  enoxaparin Injectable 40 milliGRAM(s) SubCutaneous <User Schedule>  ferrous    sulfate 325 milliGRAM(s) Oral daily  glucagon  Injectable 1 milliGRAM(s) IntraMuscular once  insulin lispro (ADMELOG) corrective regimen sliding scale   SubCutaneous three times a day before meals  insulin lispro (ADMELOG) corrective regimen sliding scale   SubCutaneous at bedtime  lacosamide 100 milliGRAM(s) Oral two times a day  levothyroxine 50 MICROGram(s) Oral daily  multivitamin 1 Tablet(s) Oral daily  pantoprazole    Tablet 40 milliGRAM(s) Oral before breakfast  polyethylene glycol 3350 17 Gram(s) Oral daily  senna 2 Tablet(s) Oral at bedtime    MEDICATIONS  (PRN):  acetaminophen     Tablet .. 650 milliGRAM(s) Oral every 6 hours PRN Temp greater or equal to 38C (100.4F), Mild Pain (1 - 3)  dextrose Oral Gel 15 Gram(s) Oral once PRN Blood Glucose LESS THAN 70 milliGRAM(s)/deciliter  hydrALAZINE Injectable 10 milliGRAM(s) IV Push every 2 hours PRN SBP > 140  labetalol Injectable 10 milliGRAM(s) IV Push every 2 hours PRN Systolic blood pressure > 140  ondansetron Injectable 4 milliGRAM(s) IV Push every 6 hours PRN Nausea and/or Vomiting      LABS:                        8.0    7.32  )-----------( 181      ( 31 May 2022 05:29 )             24.7     05-31    135  |  99  |  19.2  ----------------------------<  116<H>  4.2   |  27.0  |  0.52    Ca    8.3<L>      31 May 2022 05:29  Phos  3.1     05-31  Mg     2.0     05-31

## 2022-06-01 LAB
GLUCOSE BLDC GLUCOMTR-MCNC: 133 MG/DL — HIGH (ref 70–99)
GLUCOSE BLDC GLUCOMTR-MCNC: 144 MG/DL — HIGH (ref 70–99)
GLUCOSE BLDC GLUCOMTR-MCNC: 169 MG/DL — HIGH (ref 70–99)
GLUCOSE BLDC GLUCOMTR-MCNC: 192 MG/DL — HIGH (ref 70–99)
SARS-COV-2 RNA SPEC QL NAA+PROBE: SIGNIFICANT CHANGE UP

## 2022-06-01 PROCEDURE — 99232 SBSQ HOSP IP/OBS MODERATE 35: CPT

## 2022-06-01 PROCEDURE — 99233 SBSQ HOSP IP/OBS HIGH 50: CPT

## 2022-06-01 RX ORDER — DIPHENHYDRAMINE HCL 50 MG
50 CAPSULE ORAL ONCE
Refills: 0 | Status: COMPLETED | OUTPATIENT
Start: 2022-06-01 | End: 2022-06-02

## 2022-06-01 RX ADMIN — Medication 650 MILLIGRAM(S): at 06:45

## 2022-06-01 RX ADMIN — Medication 650 MILLIGRAM(S): at 00:09

## 2022-06-01 RX ADMIN — PANTOPRAZOLE SODIUM 40 MILLIGRAM(S): 20 TABLET, DELAYED RELEASE ORAL at 05:51

## 2022-06-01 RX ADMIN — Medication 650 MILLIGRAM(S): at 13:33

## 2022-06-01 RX ADMIN — Medication 650 MILLIGRAM(S): at 01:09

## 2022-06-01 RX ADMIN — Medication 650 MILLIGRAM(S): at 07:45

## 2022-06-01 RX ADMIN — CHLORHEXIDINE GLUCONATE 1 APPLICATION(S): 213 SOLUTION TOPICAL at 11:09

## 2022-06-01 RX ADMIN — Medication 650 MILLIGRAM(S): at 22:03

## 2022-06-01 RX ADMIN — LACOSAMIDE 100 MILLIGRAM(S): 50 TABLET ORAL at 17:55

## 2022-06-01 RX ADMIN — Medication 1 TABLET(S): at 07:56

## 2022-06-01 RX ADMIN — ENOXAPARIN SODIUM 40 MILLIGRAM(S): 100 INJECTION SUBCUTANEOUS at 17:55

## 2022-06-01 RX ADMIN — Medication 325 MILLIGRAM(S): at 07:56

## 2022-06-01 RX ADMIN — Medication 650 MILLIGRAM(S): at 12:33

## 2022-06-01 RX ADMIN — LACOSAMIDE 100 MILLIGRAM(S): 50 TABLET ORAL at 05:51

## 2022-06-01 RX ADMIN — Medication 650 MILLIGRAM(S): at 23:03

## 2022-06-01 RX ADMIN — Medication 50 MICROGRAM(S): at 05:51

## 2022-06-01 RX ADMIN — Medication 500 MILLIGRAM(S): at 07:56

## 2022-06-01 NOTE — PROGRESS NOTE ADULT - ASSESSMENT
Patient is a 89 y/o female with history of DM, HLD, Hypothyroidism, Arthritis, independent at baseline and still drives who [place] [facility] [place] after sustaining a mechanical fall and hitting the back of her head. Hospital course complicated by an acute episode of altered mentation and aphasia on 5/28 for which VEEG and MRI were ordered. Patient is now AAO x 4 and stable for downgrade to the hospitalist service for further management.    1. Traumatic Right Posterior Parietal/Occipital IPH  -neurochecks 4 hours  -repeat CT head on 5/28 with stable bleed  -MRI/ MRA pending. needs premedication for contrast allergy. being premedicated now  -cleared to start DVT ppx and same started  -Vimpat for seizure prophylaxis (switched from Keppra)  -fall precautions  -PT evaluation - AR. Rehab Med eval- following. No final decision yet  -Neurosurgery following    2. Episode of AMS + aphasia - resolved  -code stroke on 5/28 but not a candidate for tPA given IPH  -VEEG - negative for Seizures  -continue Vimpat for prophylaxis  -seizure precautions  -Neuro and Epilepsy following    3. DM2 controlled  -start ISS  -hold Natiglinide while inpatient  -may need to add weight based insulin based on fingersticks  -ADA diet    4. HLD  -continue lipitor    5. Hypothyroidism  -TSH WNL  -continue synthroid    6. Constipation- resolved  -continue senna/ miralax    7. Anemia likely due to iron deficiency   -Hb 8.3 today  -iron studies reviewed  -continue ferrous sulfate and MVI  -monitor CBC  -outpatient heme evaluation     8. Mild Hyponatremia  -likely due to volume depletion  -sodium improved from 130 --> 134 s/p NS  -urine studies on 5/29 reviewed but patient was on saline   -liberalize sodium in diet  -monitor I/Os  -repeat BMP on 5/31- normalized    10. Arthritis  -X rays- OA  -continue Tylenol as needed; avoid narcotics  -PT evaluation; OOB to chair    [facility]     Dispo- to Rehab after MRI/ MRA

## 2022-06-01 NOTE — PROGRESS NOTE ADULT - SUBJECTIVE AND OBJECTIVE BOX
Patient feels well.  Continues to have intermittent tremors.   Headache and neck pain are improved.   Has her usual bone pains.     REVIEW OF SYSTEMS  Constitutional - No fever,  +fatigue  Neurological - +headaches, +loss of strength, +tremors  Musculoskeletal - +joint pain, No joint swelling, +muscle pain  Psychiatric - No depression, No anxiety    FUNCTIONAL PROGRESS  5/31 PT  Bed Mobility  Bed Mobility Training Sit-to-Supine: n/a, pt was left sitting in chair with chair alarm, RN aware  Bed Mobility Training Supine-to-Sit: minimum assist (75% patient effort);  1 person assist;  required increased assistance to get bilateral LE's out of bed/assume upright sitting at EOB position + verbal cues for proper hand placement. ;  bed rails  Bed Mobility Training Limitations: decreased ability to use legs for bridging/pushing;  decreased ability to use arms for pushing/pulling;  decreased ROM;  decreased strength;  impaired balance;  decreased endurance     Sit-Stand Transfer Training  Transfer Training Sit-to-Stand Transfer: minimum assist (75% patient effort);  1 person assist;  required increased assistance  to help rise to a full standing position +verbal cues for proper hand placement. ;  rolling walker  Transfer Training Stand-to-Sit Transfer: minimum assist (75% patient effort);  1 person assist;  required increased assistance  to help safely lower to a sitting position + verbal cues for proper hand placement. ;  rolling walker  Sit-to-Stand Transfer Training Transfer Safety Analysis: decreased balance;  decreased step length;  decreased weight-shifting ability;  decreased ROM;  decreased strength;  impaired balance;  decreased endurance     Gait Training  Gait Training: minimum assist (75% patient effort);  1 person assist;  required increased assistance + verbal cues to help maintain proper upright walking posture/pattern(as pt with noted increased forward lean) + for proper use/sequencing of AD. ;  rolling walker;  20 ft (There/back) including turns   Gait Analysis: 3-point gait   decreased rajiv;  decreased hip/knee flexion;  decreased step length;  decreased stride length;  decreased weight-shifting ability;  decreased ROM;  decreased strength;  impaired balance;  decreased endurance       VITALS  T(C): 36.6 (06-01-22 @ 05:30), Max: 36.8 (05-31-22 @ 20:00)  HR: 77 (06-01-22 @ 05:30) (74 - 83)  BP: 127/74 (06-01-22 @ 05:30) (116/63 - 134/75)  RR: 18 (06-01-22 @ 05:30) (18 - 18)  SpO2: 97% (06-01-22 @ 05:30) (96% - 98%)  Wt(kg): --    MEDICATIONS   acetaminophen     Tablet .. 650 milliGRAM(s) every 6 hours PRN  ascorbic acid 500 milliGRAM(s) daily  chlorhexidine 2% Cloths 1 Application(s) daily  dextrose 5%. 1000 milliLiter(s) <Continuous>  dextrose 5%. 1000 milliLiter(s) <Continuous>  dextrose 50% Injectable 25 Gram(s) once  dextrose 50% Injectable 12.5 Gram(s) once  dextrose 50% Injectable 25 Gram(s) once  dextrose Oral Gel 15 Gram(s) once PRN  enoxaparin Injectable 40 milliGRAM(s) <User Schedule>  ferrous    sulfate 325 milliGRAM(s) daily  glucagon  Injectable 1 milliGRAM(s) once  hydrALAZINE Injectable 10 milliGRAM(s) every 2 hours PRN  insulin lispro (ADMELOG) corrective regimen sliding scale   three times a day before meals  insulin lispro (ADMELOG) corrective regimen sliding scale   at bedtime  labetalol Injectable 10 milliGRAM(s) every 2 hours PRN  lacosamide 100 milliGRAM(s) two times a day  levothyroxine 50 MICROGram(s) daily  multivitamin 1 Tablet(s) daily  ondansetron Injectable 4 milliGRAM(s) every 6 hours PRN  pantoprazole    Tablet 40 milliGRAM(s) before breakfast  polyethylene glycol 3350 17 Gram(s) daily  senna 2 Tablet(s) at bedtime      RECENT LABS/IMAGING                          8.0    7.32  )-----------( 181      ( 31 May 2022 05:29 )             24.7     05-31    135  |  99  |  19.2  ----------------------------<  116<H>  4.2   |  27.0  |  0.52    Ca    8.3<L>      31 May 2022 05:29  Phos  3.1     05-31  Mg     2.0     05-31                    HEAD CT 5/25 - An approximately 2.7 x 2.2 x 3.2 cm right parietal intraparenchymal hemorrhage is slightly increased in size from 2.4 x 2 x 3.2 cm on 05/22/2022 at 3:13 PM and 2.2 x 1.2 x 2 cm and 05/25/2022 at 8:54 AM. Trace right parietal subarachnoid hemorrhage and trace subdural hemorrhage along the right cerebellar tentorium are stable.    HEAD CT 5/26 - No significant interval change from CT brain 5/25/2022 obtained at 10:19 PM    HEAD CT 5/28 - 1. Stable appearance of previously visualized right posterior parietal/occipital intraparenchymal hematoma. Mild surrounding edema. 2. No evidence of an acute large arterial distribution infarct.    CXR 5/28 - Unremarkable frontal chest x ray    EEG 5/30 - Abnormal EEG study.   - Presence of generalized periodic discharges (GPDs) with triphasic morphology is indicative of severe metabolic encephalopathy, but can be considered epileptiform in the right clinical context. Improving burden in the last quarter of recording.  - functional abnormality in the right parietal region.  - Moderate nonspecific diffuse or multifocal cerebral dysfunction.   - No seizures were recorded.    EEG 5/31 - GPDs with triphasic morphology may be associated with an increased risk for seizure, but are typically seen in the context of a relatively moderate to severe metabolic encephalopathic process.    GPDs less prominent vs prior day.  Right sided cerebral dysfunction, subte.  No seizures x 2 days. In absence of additional clinical concerns, recommend consideration for discontinuation of current EEG study with reconnection in future if warranted.  ----------------------------------------------------------------------------------------  PHYSICAL EXAM  Constitutional - NAD, Comfortable  Neck - Supple, +limited ROM due to neck/head pain  Extremities - No C/C/E, No calf tenderness   Neurologic Exam -                    Cognitive - AAO to self, place, PART situation     Communication - Fluent, No dysarthria     Cranial Nerves - Left peripheral droop - mild     Motor - No focal deficits       Sensory - Intact to LT  Psychiatric - Mood stable, Calm   ----------------------------------------------------------------------------------------  ASSESSMENT/PLAN  89yFemale with functional deficits after a mechanical fall sustaining an IPH  Right parietal IPH - Vimpat, MRA/MRI planned (R/O Amyloid vs Lesion)   HTN - Hydralazine, Labetalol  Pain - Tylenol  DVT PPX - SCDs, Lovenox  Rehab - Patient pending additional workup. When medically optimized, expect will need AR pending ongoing functional progress.     Will continue to follow. Rehab recommendations are dependent on how functional progress changes as well as how patient continues to participate and tolerate therapeutic interventions, which may change. Recommend ongoing mobilization by staff to maintain cardiopulmonary function and prevention of secondary complications related to debility. Discussed with rehab team.

## 2022-06-01 NOTE — PROGRESS NOTE ADULT - SUBJECTIVE AND OBJECTIVE BOX
HEALTH ISSUES - PROBLEM Dx:  history of DM, HLD, Hypothyroidism, Arthritis, [place] Palsy in 1946,    fall--> SDH    INTERVAL HPI/ OVERNIGHT EVENTS:    lying in bed  fully cognitive and comprehending and conversing  no focal deficits noted  eager to go home    REVIEW OF SYSTEMS:    as above    Vital Signs Last 24 Hrs  T(C): 36.9 (01 Jun 2022 07:54), Max: 36.9 (01 Jun 2022 07:54)  T(F): 98.5 (01 Jun 2022 07:54), Max: 98.5 (01 Jun 2022 07:54)  HR: 80 (01 Jun 2022 12:00) (74 - 80)  BP: 113/72 (01 Jun 2022 12:00) (113/72 - 134/75)  BP(mean): --  RR: 18 (01 Jun 2022 12:00) (18 - 18)  SpO2: 98% (01 Jun 2022 12:00) (96% - 98%)    PHYSICAL EXAM-  GENERAL: elderly female laying in bed, Comfortable no distress  HEAD:  Atraumatic, Normocephalic  EYES: EOMI, PERRLA, conjunctiva and sclera clear  ENMT: Moist mucous membranes  NECK: Supple,    NERVOUS SYSTEM:  Alert & Oriented X 3, Motor Strength 5/5 B/L upper and lower extremities, speech, language intact  CHEST/LUNG: Clear to auscultation bilaterally; No rales, rhonchi, wheezing, or rubs  HEART: Regular rate and rhythm; + S1/S2  ABDOMEN: Soft, Nontender, obese; Bowel sounds present  EXTREMITIES:  no pedal edema    MEDICATIONS  (STANDING):  ascorbic acid 500 milliGRAM(s) Oral daily  chlorhexidine 2% Cloths 1 Application(s) Topical daily  dextrose 5%. 1000 milliLiter(s) (50 mL/Hr) IV Continuous <Continuous>  dextrose 5%. 1000 milliLiter(s) (100 mL/Hr) IV Continuous <Continuous>  dextrose 50% Injectable 25 Gram(s) IV Push once  dextrose 50% Injectable 12.5 Gram(s) IV Push once  dextrose 50% Injectable 25 Gram(s) IV Push once  diphenhydrAMINE Injectable 50 milliGRAM(s) IV Push once  enoxaparin Injectable 40 milliGRAM(s) SubCutaneous <User Schedule>  ferrous    sulfate 325 milliGRAM(s) Oral daily  glucagon  Injectable 1 milliGRAM(s) IntraMuscular once  insulin lispro (ADMELOG) corrective regimen sliding scale   SubCutaneous three times a day before meals  insulin lispro (ADMELOG) corrective regimen sliding scale   SubCutaneous at bedtime  lacosamide 100 milliGRAM(s) Oral two times a day  levothyroxine 50 MICROGram(s) Oral daily  methylPREDNISolone sodium succinate Injectable 100 milliGRAM(s) IV Push once  multivitamin 1 Tablet(s) Oral daily  pantoprazole    Tablet 40 milliGRAM(s) Oral before breakfast  polyethylene glycol 3350 17 Gram(s) Oral daily  senna 2 Tablet(s) Oral at bedtime    MEDICATIONS  (PRN):  acetaminophen     Tablet .. 650 milliGRAM(s) Oral every 6 hours PRN Temp greater or equal to 38C (100.4F), Mild Pain (1 - 3)  dextrose Oral Gel 15 Gram(s) Oral once PRN Blood Glucose LESS THAN 70 milliGRAM(s)/deciliter  hydrALAZINE Injectable 10 milliGRAM(s) IV Push every 2 hours PRN SBP > 140  labetalol Injectable 10 milliGRAM(s) IV Push every 2 hours PRN Systolic blood pressure > 140  ondansetron Injectable 4 milliGRAM(s) IV Push every 6 hours PRN Nausea and/or Vomiting      LABS:                        8.0    7.32  )-----------( 181      ( 31 May 2022 05:29 )             24.7     05-31    135  |  99  |  19.2  ----------------------------<  116<H>  4.2   |  27.0  |  0.52    Ca    8.3<L>      31 May 2022 05:29  Phos  3.1     05-31  Mg     2.0     05-31

## 2022-06-01 NOTE — CHART NOTE - NSCHARTNOTEFT_GEN_A_CORE
Neurosurgery    Pt has an Iodine allergy & needs to be pre-medicated before getting the MRI brain w/wo [facility]oses    Solu-medrol 100mg IV x 1 is ordered to be given now & MRI can be done in 4 hours    Benedryl 50 mg IV to be given 1 hour prior to MRI brain

## 2022-06-01 NOTE — PROGRESS NOTE ADULT - ASSESSMENT
IMPRESSION:    Right Occipital IPH        ASSESSMENT/ PLAN:     - Neuro checks and vital signs Q 2 hours.  - SBP goal less than 140 mm Hg.  -  MRI Brain with and without moses.  - On Vimpat for seizure prophylaxis   - PT OT SLP evaluation.  - SCD/ SQ Lovenox for DVT prophylaxis.

## 2022-06-01 NOTE — PROGRESS NOTE ADULT - SUBJECTIVE AND OBJECTIVE BOX
Vascular Neurology      Neurology consult    ELLI FLEMING 89y Female     Patient is a 89y old  Female who presents with a chief complaint of transfer from [facility], s/p fall with [facility] (01 Jun 2022 15:26)      HPI:  Patient is a 89 y/o female transfer from [facility] s/p trip and fall. Imaging studies at [place] revealed right intraparenchymal hemorrhage prompting transfer to [facility] for neurosurgical evaluation & management. On arrival patient with no acute complaints. States she tripped and fell, no loss of consciousness, reports she takes a baby aspirin daily. No other reported anticoagulation / antiplatelet use. Airway: intact, c-collar in place on arrival. Breathing: breath sounds CTA b/l, no accessory muscle use, no conversational dyspnea. Circulation: 2+ central & peripheral pulses b/l. Disability: pupils 2mm round and reactive to light b/l, GCS15. Exposure: fully exposed and covered w/ warm blankets. CXR & pelvis XR performed without obvious acute traumatic findings. PIV placed by RN and labs drawn. istat pH 7.45, base excess +5, Hgb 9.9. (Hgb 10.3 on blood work from [facility]).    Imaging studies from [facility] reviewed with Dr. Conley. CT head showed R intraparenchymal hemorrhage. CT cervical spine without acute findings, degenerative changes noted. Neurosurgical consult placed @ 1045. After physical exam and pt without neuro deficits / distracting injuries, cervical collar cleared by resident MD Taylor.  (25 May 2022 10:47)      PMH: Diabetes mellitus    Hyperlipidemia    Hypothyroidism         PSH: S/P cholecystectomy      FAMILY HISTORY:  No pertinent family history in first degree relatives    SOCIAL HISTORY:  No history of tobacco or alcohol use     Allergies    Celebrex (Unknown)  codeine (Unknown)  iodine (Unknown)    Intolerances    Vital Signs Last 24 Hrs  T(C): 36.6 (01 Jun 2022 15:32), Max: 36.9 (01 Jun 2022 07:54)  T(F): 97.9 (01 Jun 2022 15:32), Max: 98.5 (01 Jun 2022 07:54)  HR: 74 (01 Jun 2022 15:32) (74 - 80)  BP: 114/62 (01 Jun 2022 15:32) (113/72 - 134/75)  BP(mean): 79 (01 Jun 2022 15:32) (79 - 79)  RR: 18 (01 Jun 2022 15:32) (18 - 18)  SpO2: 95% (01 Jun 2022 15:32) (95% - 98%)  MEDICATIONS    acetaminophen     Tablet .. 650 milliGRAM(s) Oral every 6 hours PRN  ascorbic acid 500 milliGRAM(s) Oral daily  chlorhexidine 2% Cloths 1 Application(s) Topical daily  dextrose 5%. 1000 milliLiter(s) IV Continuous <Continuous>  dextrose 5%. 1000 milliLiter(s) IV Continuous <Continuous>  dextrose 50% Injectable 25 Gram(s) IV Push once  dextrose 50% Injectable 12.5 Gram(s) IV Push once  dextrose 50% Injectable 25 Gram(s) IV Push once  dextrose Oral Gel 15 Gram(s) Oral once PRN  diphenhydrAMINE Injectable 50 milliGRAM(s) IV Push once  enoxaparin Injectable 40 milliGRAM(s) SubCutaneous <User Schedule>  ferrous    sulfate 325 milliGRAM(s) Oral daily  glucagon  Injectable 1 milliGRAM(s) IntraMuscular once  hydrALAZINE Injectable 10 milliGRAM(s) IV Push every 2 hours PRN  insulin lispro (ADMELOG) corrective regimen sliding scale   SubCutaneous three times a day before meals  insulin lispro (ADMELOG) corrective regimen sliding scale   SubCutaneous at bedtime  labetalol Injectable 10 milliGRAM(s) IV Push every 2 hours PRN  lacosamide 100 milliGRAM(s) Oral two times a day  levothyroxine 50 MICROGram(s) Oral daily  methylPREDNISolone sodium succinate Injectable 100 milliGRAM(s) IV Push once  multivitamin 1 Tablet(s) Oral daily  ondansetron Injectable 4 milliGRAM(s) IV Push every 6 hours PRN  pantoprazole    Tablet 40 milliGRAM(s) Oral before breakfast  polyethylene glycol 3350 17 Gram(s) Oral daily  senna 2 Tablet(s) Oral at bedtime        LABS:  CBC Full  -  ( 31 May 2022 05:29 )  WBC Count : 7.32 K/uL  RBC Count : 2.66 M/uL  Hemoglobin : 8.0 g/dL  Hematocrit : 24.7 %  Platelet Count - Automated : 181 K/uL  Mean Cell Volume : 92.9 fl  Mean Cell Hemoglobin : 30.1 pg  Mean Cell Hemoglobin Concentration : 32.4 gm/dL  Auto Neutrophil # : x  Auto Lymphocyte # : x  Auto Monocyte # : x  Auto Eosinophil # : x  Auto Basophil # : x  Auto Neutrophil % : x  Auto Lymphocyte % : x  Auto Monocyte % : x  Auto Eosinophil % : x  Auto Basophil % : x      05-31    135  |  99  |  19.2  ----------------------------<  116<H>  4.2   |  27.0  |  0.52    Ca    8.3<L>      31 May 2022 05:29  Phos  3.1     05-31  Mg     2.0     05-31        Hemoglobin A1C:   On Neurological Examination:    Mental Status - Patient is  awake, alert and oriented X3.  Speech is fluent. Patient can name, repeat and follow commands correctly  There is no dysarthria.    Cranial Nerves - PERRL, EOMI,  Visual fields are full to finger counting,   There is a left facial weakness ( history of Left Bell's Palsy)  Motor Exam -   Right upper ---5/5 No drift  Left upper ---5/5 No drift  Right lower ---5/5 No drift  Left lower  ---5/5 No drift     nml bulk/tone    Sensory    Intact to light touch and pinprick bilaterally    Coord: FTN intact bilaterally     Gait -  Not assessed.     RADIOLOGY ( All neurological imaging studies were independently reviewed and interpreted by me)  CTH   CTA  CTP   CT Head No Cont (05.28.22 @ 18:23) >    IMPRESSION:  1. Stable appearance of previously visualized right posterior   parietal/occipital intraparenchymal hematoma. Mild surrounding edema.  2. No evidence of an acute large arterial distribution infarct.    Findings were discussed between Dr. Behr-Ventura and DAVID Stephens   at approximately 6:20 PM 5/28/2022.    DAWN M BEHR-VENTURA MD; Attending Radiologist      MRI:      TTE   TTE Echo Complete w/ Contrast w/ Doppler (05.25.22 @ 17:51) >    Summary:   1. Left ventricular ejection fraction, by visual estimation, is 60 to   65%.   2. Normal global left ventricular systolic function.   3. Spectral Doppler shows impaired relaxation pattern of left   ventricular myocardial filling (Grade I diastolic dysfunction).   4. Mildly enlarged right ventricle with normal RV systolic function.   5. Estimated pulmonary artery systolic pressure is 39.7 mmHg assuming a   right atrial pressure of 3 mmHg, which is consistent with borderline   pulmonary hypertension.   6. Mildly enlarged left atrium.   7. Right atrial enlargement.   8. Mild mitral annular calcification.   9. Moderate mitral valve regurgitation.  10. Mild-moderate tricuspid regurgitation.  11. Mild aortic regurgitation.  12. Sclerotic aortic valve with normal opening.  13. There is mild aortic root calcification.  14. There is no evidence of pericardial effusion.    Collins Castaneda DO Electronically signed on 5/25/2022 at 8:10:04 PM                                                                              continuous EEG 5/29/22- 5/30/22   EEG IMPRESSION/CLINICAL CORRELATE    Abnormal EEG study.    - Presence of generalized periodic discharges (GPDs) with triphasic morphology is indicative of severe metabolic encephalopathy, but can be considered epileptiform in the right clinical context. Improving burden in the last quarter of recording.  - functional abnormality in the right parietal region.  - Moderate nonspecific diffuse or multifocal cerebral dysfunction.   - No seizures were recorded.    continuous EEG 5/30/22- 5/31/22  EEG SUMMARY/CLASSIFICATION  Abnormal EEG in an encephalopathic patient.  - Occasional intermittent GPDs up to 1Hz, at times with shifting asymmetries and triphasic morphology.  Less frequent vs prior day.  - Delta slightly more prominent right posteriorly  - Background slowing, generalized.    _____________________________________________________________  EEG IMPRESSION/CLINICAL CORRELATE  GPDs with triphasic morphology may be associated with an increased risk for seizure, but are typically seen in the context of a relatively moderate to severe metabolic encephalopathic process.    GPDs less prominent vs prior day.  Right sided cerebral dysfunction, subtle.  No seizures x 2 days.  In absence of additional clinical concerns, recommend consideration for discontinuation of current EEG study with reconnection in future if warranted.

## 2022-06-02 LAB
ANION GAP SERPL CALC-SCNC: 11 MMOL/L — SIGNIFICANT CHANGE UP (ref 5–17)
BUN SERPL-MCNC: 18.4 MG/DL — SIGNIFICANT CHANGE UP (ref 8–20)
CALCIUM SERPL-MCNC: 8.2 MG/DL — LOW (ref 8.6–10.2)
CHLORIDE SERPL-SCNC: 102 MMOL/L — SIGNIFICANT CHANGE UP (ref 98–107)
CO2 SERPL-SCNC: 25 MMOL/L — SIGNIFICANT CHANGE UP (ref 22–29)
CREAT SERPL-MCNC: 0.52 MG/DL — SIGNIFICANT CHANGE UP (ref 0.5–1.3)
EGFR: 85 ML/MIN/1.73M2 — SIGNIFICANT CHANGE UP
GLUCOSE BLDC GLUCOMTR-MCNC: 142 MG/DL — HIGH (ref 70–99)
GLUCOSE BLDC GLUCOMTR-MCNC: 244 MG/DL — HIGH (ref 70–99)
GLUCOSE BLDC GLUCOMTR-MCNC: 253 MG/DL — HIGH (ref 70–99)
GLUCOSE BLDC GLUCOMTR-MCNC: 298 MG/DL — HIGH (ref 70–99)
GLUCOSE SERPL-MCNC: 124 MG/DL — HIGH (ref 70–99)
HCT VFR BLD CALC: 24.7 % — LOW (ref 34.5–45)
HGB BLD-MCNC: 7.9 G/DL — LOW (ref 11.5–15.5)
MCHC RBC-ENTMCNC: 29.6 PG — SIGNIFICANT CHANGE UP (ref 27–34)
MCHC RBC-ENTMCNC: 32 GM/DL — SIGNIFICANT CHANGE UP (ref 32–36)
MCV RBC AUTO: 92.5 FL — SIGNIFICANT CHANGE UP (ref 80–100)
PLATELET # BLD AUTO: 216 K/UL — SIGNIFICANT CHANGE UP (ref 150–400)
POTASSIUM SERPL-MCNC: 4.7 MMOL/L — SIGNIFICANT CHANGE UP (ref 3.5–5.3)
POTASSIUM SERPL-SCNC: 4.7 MMOL/L — SIGNIFICANT CHANGE UP (ref 3.5–5.3)
RBC # BLD: 2.67 M/UL — LOW (ref 3.8–5.2)
RBC # FLD: 15.4 % — HIGH (ref 10.3–14.5)
SODIUM SERPL-SCNC: 138 MMOL/L — SIGNIFICANT CHANGE UP (ref 135–145)
WBC # BLD: 7.37 K/UL — SIGNIFICANT CHANGE UP (ref 3.8–10.5)
WBC # FLD AUTO: 7.37 K/UL — SIGNIFICANT CHANGE UP (ref 3.8–10.5)

## 2022-06-02 PROCEDURE — 70544 MR ANGIOGRAPHY HEAD W/O DYE: CPT | Mod: 26,59

## 2022-06-02 PROCEDURE — 99233 SBSQ HOSP IP/OBS HIGH 50: CPT | Mod: GC

## 2022-06-02 PROCEDURE — 70551 MRI BRAIN STEM W/O DYE: CPT | Mod: 26

## 2022-06-02 PROCEDURE — 99232 SBSQ HOSP IP/OBS MODERATE 35: CPT

## 2022-06-02 PROCEDURE — 99233 SBSQ HOSP IP/OBS HIGH 50: CPT

## 2022-06-02 RX ADMIN — SENNA PLUS 2 TABLET(S): 8.6 TABLET ORAL at 22:35

## 2022-06-02 RX ADMIN — Medication 500 MILLIGRAM(S): at 10:42

## 2022-06-02 RX ADMIN — Medication 100 MILLIGRAM(S): at 08:26

## 2022-06-02 RX ADMIN — Medication 3: at 17:40

## 2022-06-02 RX ADMIN — Medication 650 MILLIGRAM(S): at 23:50

## 2022-06-02 RX ADMIN — Medication 325 MILLIGRAM(S): at 10:42

## 2022-06-02 RX ADMIN — Medication 50 MICROGRAM(S): at 05:53

## 2022-06-02 RX ADMIN — CHLORHEXIDINE GLUCONATE 1 APPLICATION(S): 213 SOLUTION TOPICAL at 10:44

## 2022-06-02 RX ADMIN — Medication 2: at 14:03

## 2022-06-02 RX ADMIN — Medication 1 MILLIGRAM(S): at 12:45

## 2022-06-02 RX ADMIN — ENOXAPARIN SODIUM 40 MILLIGRAM(S): 100 INJECTION SUBCUTANEOUS at 17:41

## 2022-06-02 RX ADMIN — Medication 1 TABLET(S): at 10:42

## 2022-06-02 RX ADMIN — Medication 650 MILLIGRAM(S): at 22:50

## 2022-06-02 RX ADMIN — Medication 1 MILLIGRAM(S): at 13:20

## 2022-06-02 RX ADMIN — PANTOPRAZOLE SODIUM 40 MILLIGRAM(S): 20 TABLET, DELAYED RELEASE ORAL at 05:53

## 2022-06-02 RX ADMIN — LACOSAMIDE 100 MILLIGRAM(S): 50 TABLET ORAL at 05:53

## 2022-06-02 RX ADMIN — LACOSAMIDE 100 MILLIGRAM(S): 50 TABLET ORAL at 17:41

## 2022-06-02 RX ADMIN — Medication 50 MILLIGRAM(S): at 11:55

## 2022-06-02 RX ADMIN — Medication 650 MILLIGRAM(S): at 05:56

## 2022-06-02 RX ADMIN — Medication 1: at 22:34

## 2022-06-02 RX ADMIN — POLYETHYLENE GLYCOL 3350 17 GRAM(S): 17 POWDER, FOR SOLUTION ORAL at 10:44

## 2022-06-02 NOTE — PROGRESS NOTE ADULT - ASSESSMENT
ASSESSMENT  89F s/p transfer from [facility] after trip & fall, found to have R IPH, ccb brief episode of aphasia with negative EEG & inability to tolerate full MRI/MRA head.      PLAN  - case d/w team  - no acute neurosurgical intervention indicated at this time  - patient declining repeat MRI/MRA with sedation given her age & it's findings likely not changing the course of treatment  - pain control as needed  - PT/OT/PM&R following  - NSGY signing off, reconsult if needed

## 2022-06-02 NOTE — PROGRESS NOTE ADULT - SUBJECTIVE AND OBJECTIVE BOX
HPI: Patient is a 89 y/o female transfer from [facility] s/p trip and fall. Imaging studies at [place] revealed right intraparenchymal hemorrhage prompting transfer to [facility] for neurosurgical evaluation & management. On arrival patient with no acute complaints. States she tripped and fell, no loss of consciousness, reports she takes a baby aspirin daily. No other reported anticoagulation / antiplatelet use. Airway: intact, c-collar in place on arrival. Breathing: breath sounds CTA b/l, no accessory muscle use, no conversational dyspnea. Circulation: 2+ central & peripheral pulses b/l. Disability: pupils 2mm round and reactive to light b/l, GCS15. Exposure: fully exposed and covered w/ warm blankets. CXR & pelvis XR performed without obvious acute traumatic findings. PIV placed by RN and labs drawn. istat pH 7.45, base excess +5, Hgb 9.9. (Hgb 10.3 on blood work from [facility]).    Imaging studies from [facility] reviewed with Dr. Conley. CT head showed R intraparenchymal hemorrhage. CT cervical spine without acute findings, degenerative changes noted. Neurosurgical consult placed @ 1045. After physical exam and pt without neuro deficits / distracting injuries, cervical collar cleared by resident MD Taylor.  (25 May 2022 10:47)      INTERVAL HPI/OVERNIGHT EVENTS:  89y Female seen lying comfortably in bed. Went for MRI today but could not tolerate study. Spoke with patient and family at bedside who express desire to not repeat study as it will not change course of treatment given patients age and poor surgical candidacy if warranted.       Vital Signs Last 24 Hrs  T(C): 36.7 (02 Jun 2022 15:23), Max: 36.7 (02 Jun 2022 09:40)  T(F): 98.1 (02 Jun 2022 15:23), Max: 98.1 (02 Jun 2022 09:40)  HR: 78 (02 Jun 2022 15:23) (70 - 78)  BP: 119/69 (02 Jun 2022 15:23) (110/58 - 134/60)  BP(mean): 86 (02 Jun 2022 15:23) (86 - 86)  RR: 18 (02 Jun 2022 15:23) (18 - 19)  SpO2: 95% (02 Jun 2022 15:23) (95% - 97%)    PHYSICAL EXAM:  GENERAL: NAD, well-groomed  HEAD:  Atraumatic, normocephalic  MENTAL STATUS: AAO x3; Awake. Opens eyes spontaneously. Appropriately conversant without aphasia, following simple commands.  CRANIAL NERVES: Visual acuity normal for age, visual fields full to confrontation, PERRL. EOMI without nystagmus. Facial sensation intact V1-3 distribution b/l. Face symmetric w/ normal eye closure and smile, tongue midline. Hearing grossly intact. Speech clear.   MOTOR: b/l UE/LE AG, generalized weakness  SENSATION: grossly intact to light touch all extremities    LABS:                        7.9    7.37  )-----------( 216      ( 02 Jun 2022 05:39 )             24.7     06-02    138  |  102  |  18.4  ----------------------------<  124<H>  4.7   |  25.0  |  0.52    Ca    8.2<L>      02 Jun 2022 05:39            06-01 @ 07:01  -  06-02 @ 07:00  --------------------------------------------------------  IN: 720 mL / OUT: 900 mL / NET: -180 mL        RADIOLOGY & ADDITIONAL TESTS:  < from: MR Head No Cont (06.02.22 @ 13:55) >    IMPRESSION:  Limited by motion and patient's inability to complete exam.  Small acute to subacute parenchymal hemorrhage right occipital lobe again   seen.  No gross abnormal flow related enhancement associated with the   parenchymal hemorrhage.    < end of copied text >      < from: CT Head No Cont (05.28.22 @ 18:23) >  IMPRESSION:  1. Stable appearance of previously visualized right posterior   parietal/occipital intraparenchymal hematoma. Mild surrounding edema.  2. No evidence of an acute large arterial distribution infarct.      < end of copied text >    < from: CT Head No Cont (05.26.22 @ 06:32) >  IMPRESSION:    No significant interval change from CT brain 5/25/2022 obtained at 10:19   PM    < end of copied text >    < from: CT Head No Cont (05.25.22 @ 22:22) >  IMPRESSION:  An approximately 2.7 x 2.2 x 3.2 cm right parietal intraparenchymal   hemorrhage is slightly increased in size from 2.4 x 2 x 3.2 cm on   05/22/2022 at 3:13 PM and 2.2 x 1.2 x 2 cm and 05/25/2022 at 8:54 AM.    Trace right parietal subarachnoid hemorrhage and trace subdural   hemorrhage along the right cerebellar tentorium are stable.    < end of copied text >      CAPRINI SCORE [CLOT]:  Patient has an estimated Caprini score of greater than 5.  However, the patient's unique clinical situation will be addressed in an individual manner to determine appropriate anticoagulation treatment, if any.

## 2022-06-02 NOTE — PROGRESS NOTE ADULT - SUBJECTIVE AND OBJECTIVE BOX
HEALTH ISSUES - PROBLEM Dx:  history of DM, HLD, Hypothyroidism, Arthritis, [place] Palsy in 1946,    fall--> SDH    INTERVAL HPI/ OVERNIGHT EVENTS:    lying in bed  fully cognitive and comprehending and conversing  no focal deficits noted  eager to go home    REVIEW OF SYSTEMS:    as above    Vital Signs Last 24 Hrs  T(C): 36.5 (02 Jun 2022 14:00), Max: 36.7 (02 Jun 2022 09:40)  T(F): 97.7 (02 Jun 2022 14:00), Max: 98.1 (02 Jun 2022 09:40)  HR: 77 (02 Jun 2022 14:00) (70 - 77)  BP: 134/60 (02 Jun 2022 14:00) (110/58 - 134/60)  BP(mean): 79 (01 Jun 2022 15:32) (79 - 79)  RR: 18 (02 Jun 2022 14:00) (18 - 19)  SpO2: 97% (02 Jun 2022 14:00) (95% - 97%)    PHYSICAL EXAM-  GENERAL: elderly female laying in bed, Comfortable no distress  HEAD:  Atraumatic, Normocephalic  EYES: EOMI, PERRLA, conjunctiva and sclera clear  ENMT: Moist mucous membranes  NECK: Supple,    NERVOUS SYSTEM:  Alert & Oriented X 3, Motor Strength 5/5 B/L upper and lower extremities, speech, language intact  CHEST/LUNG: Clear to auscultation bilaterally; No rales, rhonchi, wheezing, or rubs  HEART: Regular rate and rhythm; + S1/S2  ABDOMEN: Soft, Nontender, obese; Bowel sounds present  EXTREMITIES:  no pedal edema    MEDICATIONS  (STANDING):  ascorbic acid 500 milliGRAM(s) Oral daily  chlorhexidine 2% Cloths 1 Application(s) Topical daily  dextrose 5%. 1000 milliLiter(s) (100 mL/Hr) IV Continuous <Continuous>  dextrose 5%. 1000 milliLiter(s) (50 mL/Hr) IV Continuous <Continuous>  dextrose 50% Injectable 25 Gram(s) IV Push once  dextrose 50% Injectable 12.5 Gram(s) IV Push once  dextrose 50% Injectable 25 Gram(s) IV Push once  enoxaparin Injectable 40 milliGRAM(s) SubCutaneous <User Schedule>  ferrous    sulfate 325 milliGRAM(s) Oral daily  glucagon  Injectable 1 milliGRAM(s) IntraMuscular once  insulin lispro (ADMELOG) corrective regimen sliding scale   SubCutaneous three times a day before meals  insulin lispro (ADMELOG) corrective regimen sliding scale   SubCutaneous at bedtime  lacosamide 100 milliGRAM(s) Oral two times a day  levothyroxine 50 MICROGram(s) Oral daily  multivitamin 1 Tablet(s) Oral daily  pantoprazole    Tablet 40 milliGRAM(s) Oral before breakfast  polyethylene glycol 3350 17 Gram(s) Oral daily  senna 2 Tablet(s) Oral at bedtime    MEDICATIONS  (PRN):  acetaminophen     Tablet .. 650 milliGRAM(s) Oral every 6 hours PRN Temp greater or equal to 38C (100.4F), Mild Pain (1 - 3)  dextrose Oral Gel 15 Gram(s) Oral once PRN Blood Glucose LESS THAN 70 milliGRAM(s)/deciliter  hydrALAZINE Injectable 10 milliGRAM(s) IV Push every 2 hours PRN SBP > 140  labetalol Injectable 10 milliGRAM(s) IV Push every 2 hours PRN Systolic blood pressure > 140  ondansetron Injectable 4 milliGRAM(s) IV Push every 6 hours PRN Nausea and/or Vomiting      LABS:                        7.9    7.37  )-----------( 216      ( 02 Jun 2022 05:39 )             24.7     06-02    138  |  102  |  18.4  ----------------------------<  124<H>  4.7   |  25.0  |  0.52    Ca    8.2<L>      02 Jun 2022 05:39

## 2022-06-02 NOTE — PROGRESS NOTE ADULT - NSPROGADDITIONALINFOA_GEN_ALL_CORE
NSGY Attg:    see above    patient seen and examined by PA staff    patient declining re-attempt at MRI    agree with plan as above  recall prn  f/u as outpatient -- will repeat CT head as outpatient and reconsider MRI/A pending repeat imaging findings and clinical course
NSGY Attg:    see above    patient seen and examined by PA staff    agree with exam and plan as above  MRI/A pending  will consider CT torso for met w/u pending MRI findings

## 2022-06-02 NOTE — PROGRESS NOTE ADULT - SUBJECTIVE AND OBJECTIVE BOX
Patient seen this morning. She has no complaints at this time. She stated that she was unable to get the MRI yesterday due to a contrast allergy.    REVIEW OF SYSTEMS  Constitutional - No fever,  +fatigue  Neurological - No headaches, +loss of strength  Musculoskeletal - +joint pain, No joint swelling, +muscle pain  Psychiatric - No depression, No anxiety    FUNCTIONAL PROGRESS  5/31 PT  Bed Mobility  Bed Mobility Training Sit-to-Supine: n/a, pt was left sitting in chair with chair alarm, RN aware  Bed Mobility Training Supine-to-Sit: minimum assist (75% patient effort);  1 person assist;  required increased assistance to get bilateral LE's out of bed/assume upright sitting at EOB position + verbal cues for proper hand placement. ;  bed rails  Bed Mobility Training Limitations: decreased ability to use legs for bridging/pushing;  decreased ability to use arms for pushing/pulling;  decreased ROM;  decreased strength;  impaired balance;  decreased endurance     Sit-Stand Transfer Training  Transfer Training Sit-to-Stand Transfer: minimum assist (75% patient effort);  1 person assist;  required increased assistance  to help rise to a full standing position +verbal cues for proper hand placement. ;  rolling walker  Transfer Training Stand-to-Sit Transfer: minimum assist (75% patient effort);  1 person assist;  required increased assistance  to help safely lower to a sitting position + verbal cues for proper hand placement. ;  rolling walker  Sit-to-Stand Transfer Training Transfer Safety Analysis: decreased balance;  decreased step length;  decreased weight-shifting ability;  decreased ROM;  decreased strength;  impaired balance;  decreased endurance     Gait Training  Gait Training: minimum assist (75% patient effort);  1 person assist;  required increased assistance + verbal cues to help maintain proper upright walking posture/pattern(as pt with noted increased forward lean) + for proper use/sequencing of AD. ;  rolling walker;  20 ft (There/back) including turns   Gait Analysis: 3-point gait   decreased rajiv;  decreased hip/knee flexion;  decreased step length;  decreased stride length;  decreased weight-shifting ability;  decreased ROM;  decreased strength;  impaired balance;  decreased endurance     VITALS  T(C): 36.7 (06-02-22 @ 09:40), Max: 36.7 (06-02-22 @ 09:40)  HR: 75 (06-02-22 @ 09:40) (70 - 77)  BP: 114/63 (06-02-22 @ 09:40) (110/58 - 117/65)  RR: 18 (06-02-22 @ 09:40) (18 - 19)  SpO2: 95% (06-02-22 @ 09:40) (95% - 97%)  Wt(kg): --    MEDICATIONS   acetaminophen     Tablet .. 650 milliGRAM(s) every 6 hours PRN  ascorbic acid 500 milliGRAM(s) daily  chlorhexidine 2% Cloths 1 Application(s) daily  dextrose 5%. 1000 milliLiter(s) <Continuous>  dextrose 5%. 1000 milliLiter(s) <Continuous>  dextrose 50% Injectable 25 Gram(s) once  dextrose 50% Injectable 12.5 Gram(s) once  dextrose 50% Injectable 25 Gram(s) once  dextrose Oral Gel 15 Gram(s) once PRN  enoxaparin Injectable 40 milliGRAM(s) <User Schedule>  ferrous    sulfate 325 milliGRAM(s) daily  glucagon  Injectable 1 milliGRAM(s) once  hydrALAZINE Injectable 10 milliGRAM(s) every 2 hours PRN  insulin lispro (ADMELOG) corrective regimen sliding scale   three times a day before meals  insulin lispro (ADMELOG) corrective regimen sliding scale   at bedtime  labetalol Injectable 10 milliGRAM(s) every 2 hours PRN  lacosamide 100 milliGRAM(s) two times a day  levothyroxine 50 MICROGram(s) daily  multivitamin 1 Tablet(s) daily  ondansetron Injectable 4 milliGRAM(s) every 6 hours PRN  pantoprazole    Tablet 40 milliGRAM(s) before breakfast  polyethylene glycol 3350 17 Gram(s) daily  senna 2 Tablet(s) at bedtime      RECENT LABS/IMAGING                          7.9    7.37  )-----------( 216      ( 02 Jun 2022 05:39 )             24.7     06-02    138  |  102  |  18.4  ----------------------------<  124<H>  4.7   |  25.0  |  0.52    Ca    8.2<L>      02 Jun 2022 05:39      HEAD CT 5/25 - An approximately 2.7 x 2.2 x 3.2 cm right parietal intraparenchymal hemorrhage is slightly increased in size from 2.4 x 2 x 3.2 cm on 05/22/2022 at 3:13 PM and 2.2 x 1.2 x 2 cm and 05/25/2022 at 8:54 AM. Trace right parietal subarachnoid hemorrhage and trace subdural hemorrhage along the right cerebellar tentorium are stable.    HEAD CT 5/26 - No significant interval change from CT brain 5/25/2022 obtained at 10:19 PM    HEAD CT 5/28 - 1. Stable appearance of previously visualized right posterior parietal/occipital intraparenchymal hematoma. Mild surrounding edema. 2. No evidence of an acute large arterial distribution infarct.    CXR 5/28 - Unremarkable frontal chest x ray    EEG 5/30 - Abnormal EEG study.   - Presence of generalized periodic discharges (GPDs) with triphasic morphology is indicative of severe metabolic encephalopathy, but can be considered epileptiform in the right clinical context. Improving burden in the last quarter of recording.  - functional abnormality in the right parietal region.  - Moderate nonspecific diffuse or multifocal cerebral dysfunction.   - No seizures were recorded.    EEG 5/31 - GPDs with triphasic morphology may be associated with an increased risk for seizure, but are typically seen in the context of a relatively moderate to severe metabolic encephalopathic process.    GPDs less prominent vs prior day.  Right sided cerebral dysfunction, subtle.  No seizures x 2 days. In absence of additional clinical concerns, recommend consideration for discontinuation of current EEG study with reconnection in future if warranted.  ----------------------------------------------------------------------------------------  PHYSICAL EXAM  Constitutional - NAD, Comfortable  Neck - Supple, +limited ROM due to neck/head pain  Extremities - No C/C/E, No calf tenderness   Neurologic Exam -                    Cognitive - AAO to self, place, PART situation     Communication - Fluent, No dysarthria     Cranial Nerves - Left lower quadrant visual field cut, Left peripheral droop - mild     Motor - No focal deficits       Sensory - Intact to LT  Psychiatric - Mood stable, Calm   ----------------------------------------------------------------------------------------  ASSESSMENT/PLAN  89yFemale with functional deficits after a mechanical fall sustaining an IPH  Right parietal IPH - Vimpat, MRA/MRI planned (R/O Amyloid vs Lesion)   HTN - Hydralazine, Labetalol  Pain - Tylenol  DVT PPX - SCDs, Lovenox  Rehab - Patient pending additional workup. When medically optimized, expect will need AR pending ongoing functional progress.     Will continue to follow. Rehab recommendations are dependent on how functional progress changes as well as how patient continues to participate and tolerate therapeutic interventions, which may change. Recommend ongoing mobilization by staff to maintain cardiopulmonary function and prevention of secondary complications related to debility. Discussed with rehab team.

## 2022-06-02 NOTE — PROGRESS NOTE ADULT - ASSESSMENT
Patient is a 89 y/o female with history of DM, HLD, Hypothyroidism, Arthritis, independent at baseline and still drives who [place] [facility] [place] after sustaining a mechanical fall and hitting the back of her head. Hospital course complicated by an acute episode of altered mentation and aphasia on 5/28 for which VEEG and MRI were ordered. Patient is now AAO x 4 and stable for downgrade to the hospitalist service for further management.    1. Traumatic Right Posterior Parietal/Occipital IPH  -neurochecks 4 hours  -repeat CT head on 5/28 with stable bleed  -MRI/ MRA- no change.  -cleared to start DVT ppx and same started  -Vimpat for seizure prophylaxis (switched from Keppra)  -fall precautions  -PT evaluation - AR. Rehab Med eval- following. No final decision yet  -Neurosurgery following. need f/u now that MRI is completed     2. Episode of AMS + aphasia - resolved  -code stroke on 5/28 but not a candidate for tPA given IPH  -VEEG - negative for Seizures  -continue Vimpat for prophylaxis  -seizure precautions  -Neuro and Epilepsy following    3. DM2 controlled  -start ISS  -hold Natiglinide while inpatient  -may need to add weight based insulin based on fingersticks  -ADA diet    4. HLD  -continue lipitor    5. Hypothyroidism  -TSH WNL  -continue synthroid    6. Constipation- resolved  -continue senna/ miralax    7. Anemia likely due to iron deficiency   -Hb 8.3 today  -iron studies reviewed  -continue ferrous sulfate and MVI  -monitor CBC  -outpatient heme evaluation     8. Mild Hyponatremia  -likely due to volume depletion  -sodium improved from 130 --> 134 s/p NS  -urine studies on 5/29 reviewed but patient was on saline   -liberalize sodium in diet  -monitor I/Os  -repeat BMP on 5/31- normalized    10. Arthritis  -X rays- OA  -continue Tylenol as needed; avoid narcotics  -PT evaluation; OOB to chair    [facility]     Dispo- PMR following, final decision pending. Needs NS f/u. and then discharge. Patient is a 89 y/o female with history of DM, HLD, Hypothyroidism, Arthritis, independent at baseline and still drives [place] [facility] [place] sustaining a mechanical fall and hitting the back of her head. Hospital course complicated by an acute episode of altered mentation and aphasia on 5/28 for which VEEG and MRI were ordered. Patient is now AAO x 4 and stable for downgrade to the hospitalist service for further management.    1. Traumatic Right Posterior Parietal/Occipital IPH  -neurochecks 4 hours  -repeat CT head on 5/28 with stable bleed  -MRI/ MRA- no change.  -cleared to start DVT ppx and same started  -Vimpat for seizure prophylaxis (switched from Keppra)  -fall precautions  -PT evaluation - AR. Rehab Med eval- following. No final decision yet  -Neurosurgery following. need f/u now that MRI is completed. NS recalled    2. Episode of AMS + aphasia - resolved  -code stroke on 5/28 but not a candidate for tPA given IPH  -VEEG - negative for Seizures  -continue Vimpat for prophylaxis  -seizure precautions  -Neuro and Epilepsy following    3. DM2 controlled  -start ISS  -hold Natiglinide while inpatient  -may need to add weight based insulin based on fingersticks  -ADA diet    4. HLD  -continue lipitor    5. Hypothyroidism  -TSH WNL  -continue synthroid    6. Constipation- resolved  -continue senna/ miralax    7. Anemia likely due to iron deficiency   -Hb 8.3 today  -iron studies reviewed  -continue ferrous sulfate and MVI  -monitor CBC  -outpatient heme evaluation     8. Mild Hyponatremia  -likely due to volume depletion  -sodium improved from 130 --> 134 s/p NS  -urine studies on 5/29 reviewed but patient was on saline   -liberalize sodium in diet  -monitor I/Os  -repeat BMP on 5/31- normalized    10. Arthritis  -X rays- OA  -continue Tylenol as needed; avoid narcotics  -PT evaluation; OOB to chair    [facility]     Dispo- PMR following, final decision pending. Needs NS f/u. and then discharge.  D/W PMR- verbalized acceptance to Acute Rehab.  same conveyed to CM today. To begin acute rehab placement

## 2022-06-03 LAB
GLUCOSE BLDC GLUCOMTR-MCNC: 151 MG/DL — HIGH (ref 70–99)
GLUCOSE BLDC GLUCOMTR-MCNC: 155 MG/DL — HIGH (ref 70–99)
GLUCOSE BLDC GLUCOMTR-MCNC: 160 MG/DL — HIGH (ref 70–99)
GLUCOSE BLDC GLUCOMTR-MCNC: 90 MG/DL — SIGNIFICANT CHANGE UP (ref 70–99)

## 2022-06-03 PROCEDURE — 99232 SBSQ HOSP IP/OBS MODERATE 35: CPT

## 2022-06-03 PROCEDURE — 99233 SBSQ HOSP IP/OBS HIGH 50: CPT | Mod: GC

## 2022-06-03 RX ADMIN — PANTOPRAZOLE SODIUM 40 MILLIGRAM(S): 20 TABLET, DELAYED RELEASE ORAL at 05:21

## 2022-06-03 RX ADMIN — Medication 1: at 12:43

## 2022-06-03 RX ADMIN — Medication 650 MILLIGRAM(S): at 22:09

## 2022-06-03 RX ADMIN — Medication 650 MILLIGRAM(S): at 12:42

## 2022-06-03 RX ADMIN — Medication 500 MILLIGRAM(S): at 12:42

## 2022-06-03 RX ADMIN — LACOSAMIDE 100 MILLIGRAM(S): 50 TABLET ORAL at 17:54

## 2022-06-03 RX ADMIN — Medication 50 MICROGRAM(S): at 05:20

## 2022-06-03 RX ADMIN — Medication 650 MILLIGRAM(S): at 23:00

## 2022-06-03 RX ADMIN — LACOSAMIDE 100 MILLIGRAM(S): 50 TABLET ORAL at 05:24

## 2022-06-03 RX ADMIN — Medication 1 TABLET(S): at 12:42

## 2022-06-03 RX ADMIN — Medication 1: at 17:55

## 2022-06-03 RX ADMIN — Medication 650 MILLIGRAM(S): at 13:41

## 2022-06-03 RX ADMIN — Medication 325 MILLIGRAM(S): at 12:42

## 2022-06-03 RX ADMIN — Medication 650 MILLIGRAM(S): at 05:23

## 2022-06-03 RX ADMIN — ENOXAPARIN SODIUM 40 MILLIGRAM(S): 100 INJECTION SUBCUTANEOUS at 17:56

## 2022-06-03 RX ADMIN — CHLORHEXIDINE GLUCONATE 1 APPLICATION(S): 213 SOLUTION TOPICAL at 12:42

## 2022-06-03 NOTE — PROGRESS NOTE ADULT - ASSESSMENT
IMPRESSION:    Right Occipital IPH        ASSESSMENT/ PLAN:     - Neuro checks and vital signs Q 2 hours.  - SBP goal less than 140 mm Hg.  -  MRI Brain  and MRA -images and reports were reviewed.    - On Vimpat for seizure prophylaxis   - PT OT SLP evaluation.  - SCD/ SQ Lovenox for DVT prophylaxis.

## 2022-06-03 NOTE — PROGRESS NOTE ADULT - SUBJECTIVE AND OBJECTIVE BOX
HEALTH ISSUES - PROBLEM Dx:  history of DM, HLD, Hypothyroidism, Arthritis, [place] Palsy in 1946,    fall--> SDH    INTERVAL HPI/ OVERNIGHT EVENTS:    sitting up in chair  fully cognitive and comprehending and conversing  no focal deficits noted  eager to go home  plan of care d/w son    REVIEW OF SYSTEMS:    as above    Vital Signs Last 24 Hrs  T(C): 36.7 (03 Jun 2022 08:41), Max: 36.8 (02 Jun 2022 22:47)  T(F): 98 (03 Jun 2022 08:41), Max: 98.2 (02 Jun 2022 22:47)  HR: 80 (03 Jun 2022 08:41) (78 - 88)  BP: 107/50 (03 Jun 2022 08:41) (107/50 - 128/58)  BP(mean): 86 (02 Jun 2022 15:23) (86 - 86)  RR: 18 (03 Jun 2022 08:41) (16 - 18)  SpO2: 95% (03 Jun 2022 08:41) (94% - 96%)    PHYSICAL EXAM-  GENERAL: elderly female laying in bed, Comfortable no distress  HEAD:  Atraumatic, Normocephalic  EYES: EOMI, PERRLA, conjunctiva and sclera clear  ENMT: Moist mucous membranes  NECK: Supple,    NERVOUS SYSTEM:  Alert & Oriented X 3, Motor Strength 5/5 B/L upper and lower extremities, speech, language intact  CHEST/LUNG: Clear to auscultation bilaterally; No rales, rhonchi, wheezing, or rubs  HEART: Regular rate and rhythm; + S1/S2  ABDOMEN: Soft, Nontender, obese; Bowel sounds present  EXTREMITIES:  no pedal edema    MEDICATIONS  (STANDING):  ascorbic acid 500 milliGRAM(s) Oral daily  chlorhexidine 2% Cloths 1 Application(s) Topical daily  dextrose 5%. 1000 milliLiter(s) (50 mL/Hr) IV Continuous <Continuous>  dextrose 5%. 1000 milliLiter(s) (100 mL/Hr) IV Continuous <Continuous>  dextrose 50% Injectable 25 Gram(s) IV Push once  dextrose 50% Injectable 12.5 Gram(s) IV Push once  dextrose 50% Injectable 25 Gram(s) IV Push once  enoxaparin Injectable 40 milliGRAM(s) SubCutaneous <User Schedule>  ferrous    sulfate 325 milliGRAM(s) Oral daily  glucagon  Injectable 1 milliGRAM(s) IntraMuscular once  insulin lispro (ADMELOG) corrective regimen sliding scale   SubCutaneous three times a day before meals  insulin lispro (ADMELOG) corrective regimen sliding scale   SubCutaneous at bedtime  lacosamide 100 milliGRAM(s) Oral two times a day  levothyroxine 50 MICROGram(s) Oral daily  multivitamin 1 Tablet(s) Oral daily  pantoprazole    Tablet 40 milliGRAM(s) Oral before breakfast  polyethylene glycol 3350 17 Gram(s) Oral daily  senna 2 Tablet(s) Oral at bedtime    MEDICATIONS  (PRN):  acetaminophen     Tablet .. 650 milliGRAM(s) Oral every 6 hours PRN Temp greater or equal to 38C (100.4F), Mild Pain (1 - 3)  dextrose Oral Gel 15 Gram(s) Oral once PRN Blood Glucose LESS THAN 70 milliGRAM(s)/deciliter  hydrALAZINE Injectable 10 milliGRAM(s) IV Push every 2 hours PRN SBP > 140  labetalol Injectable 10 milliGRAM(s) IV Push every 2 hours PRN Systolic blood pressure > 140  ondansetron Injectable 4 milliGRAM(s) IV Push every 6 hours PRN Nausea and/or Vomiting      LABS:                        7.9    7.37  )-----------( 216      ( 02 Jun 2022 05:39 )             24.7     06-02    138  |  102  |  18.4  ----------------------------<  124<H>  4.7   |  25.0  |  0.52    Ca    8.2<L>      02 Jun 2022 05:39

## 2022-06-03 NOTE — PROGRESS NOTE ADULT - ASSESSMENT
Patient is a 89 y/o female with history of DM, HLD, Hypothyroidism, Arthritis, independent at baseline and still drives who was transferred from [facility] [place] a right IP hemorrhage after sustaining a mechanical fall and hitting the back of her head. Hospital course complicated by an acute episode of altered mentation and aphasia on 5/28 for which VEEG and MRI were ordered. Patient is now AAO x 4 and stable for downgrade to the hospitalist service for further management.    1. Traumatic Right Posterior Parietal/ Occipital IPH  -repeat CT head on 5/28 with stable bleed  -MRI/ MRA- no change.  -cleared to start DVT ppx and same started  -Vimpat for seizure prophylaxis (switched from Keppra)  -fall precautions  -PT evaluation - AR. PMR accepted.  -Neurosurgery follow up noted    2. Episode of AMS + aphasia - resolved  -code stroke on 5/28 but not a candidate for tPA given IPH  -VEEG - negative for Seizures  -continue Vimpat for prophylaxis  -seizure precautions  -Neuro and Epilepsy following    3. DM2 controlled  -start ISS  -hold Natiglinide while inpatient  -may need to add weight based insulin based on fingersticks  -ADA diet    4. HLD  -continue lipitor    5. Hypothyroidism  -TSH WNL  -continue synthroid    6. Constipation- resolved  -continue senna/ miralax    7. Anemia likely due to iron deficiency   -Hb 8.3 today  -iron studies reviewed  -continue ferrous sulfate and MVI  -monitor CBC  -outpatient heme evaluation     8. Mild Hyponatremia  -likely due to volume depletion  -sodium improved from 130 --> 134 s/p NS  -urine studies on 5/29 reviewed but patient was on saline   -liberalize sodium in diet  -monitor I/Os  -repeat BMP on 5/31- normalized    10. Arthritis  -X rays- OA  -continue Tylenol as needed; avoid narcotics  -PT evaluation; OOB to chair    [facility]     [facility]po- [facility]. D/W son Kirk. he prefers [facility] because of location.   D/W VALE on 6/2/2022. she is working on placement.   remove staples tomorrow or prior to discharge. order placed

## 2022-06-03 NOTE — PROGRESS NOTE ADULT - SUBJECTIVE AND OBJECTIVE BOX
Vascular Neurology      ELLI BERNADETTE 89y Female     Patient is a 89y old  Female who presents with a chief complaint of transfer from [facility], s/p fall with [facility] (01 Jun 2022 15:26)      HPI:  Patient is a 89 y/o female transfer from [facility] s/p trip and fall. Imaging studies at [facility] revealed right intraparenchymal hemorrhage prompting transfer to [facility] for neurosurgical evaluation & management. On arrival patient with no acute complaints. States she tripped and fell, no loss of consciousness, reports she takes a baby aspirin daily. No other reported anticoagulation / antiplatelet use. Airway: intact, c-collar in place on arrival. Breathing: breath sounds CTA b/l, no accessory muscle use, no conversational dyspnea. Circulation: 2+ central & peripheral pulses b/l. Disability: pupils 2mm round and reactive to light b/l, GCS15. Exposure: fully exposed and covered w/ warm blankets. CXR & pelvis XR performed without obvious acute traumatic findings. PIV placed by RN and labs drawn. istat pH 7.45, base excess +5, Hgb 9.9. (Hgb 10.3 on blood work from [facility]).    Imaging studies from [facility] reviewed with Dr. Conley. CT head showed R intraparenchymal hemorrhage. CT cervical spine without acute findings, degenerative changes noted. Neurosurgical consult placed @ 1045. After physical exam and pt without neuro deficits / distracting injuries, cervical collar cleared by resident MD Taylor.  (25 May 2022 10:47)      PMH: Diabetes mellitus    Hyperlipidemia    Hypothyroidism         PSH: S/P cholecystectomy      FAMILY HISTORY:  No pertinent family history in first degree relatives    SOCIAL HISTORY:  No history of tobacco or alcohol use     Allergies    Celebrex (Unknown)  codeine (Unknown)  iodine (Unknown)    Intolerances          Vital Signs Last 24 Hrs  T(C): 36.4 (03 Jun 2022 15:26), Max: 36.8 (02 Jun 2022 22:47)  T(F): 97.6 (03 Jun 2022 15:26), Max: 98.2 (02 Jun 2022 22:47)  HR: 83 (03 Jun 2022 15:26) (78 - 88)  BP: 108/51 (03 Jun 2022 15:26) (107/50 - 128/58)  BP(mean): 70 (03 Jun 2022 15:26) (70 - 70)  RR: 18 (03 Jun 2022 15:26) (16 - 18)  SpO2: 94% (03 Jun 2022 15:26) (94% - 96%)    MEDICATIONS    acetaminophen     Tablet .. 650 milliGRAM(s) Oral every 6 hours PRN  ascorbic acid 500 milliGRAM(s) Oral daily  chlorhexidine 2% Cloths 1 Application(s) Topical daily  dextrose 5%. 1000 milliLiter(s) IV Continuous <Continuous>  dextrose 5%. 1000 milliLiter(s) IV Continuous <Continuous>  dextrose 50% Injectable 25 Gram(s) IV Push once  dextrose 50% Injectable 12.5 Gram(s) IV Push once  dextrose 50% Injectable 25 Gram(s) IV Push once  dextrose Oral Gel 15 Gram(s) Oral once PRN  enoxaparin Injectable 40 milliGRAM(s) SubCutaneous <User Schedule>  ferrous    sulfate 325 milliGRAM(s) Oral daily  glucagon  Injectable 1 milliGRAM(s) IntraMuscular once  hydrALAZINE Injectable 10 milliGRAM(s) IV Push every 2 hours PRN  insulin lispro (ADMELOG) corrective regimen sliding scale   SubCutaneous three times a day before meals  insulin lispro (ADMELOG) corrective regimen sliding scale   SubCutaneous at bedtime  labetalol Injectable 10 milliGRAM(s) IV Push every 2 hours PRN  lacosamide 100 milliGRAM(s) Oral two times a day  levothyroxine 50 MICROGram(s) Oral daily  multivitamin 1 Tablet(s) Oral daily  ondansetron Injectable 4 milliGRAM(s) IV Push every 6 hours PRN  pantoprazole    Tablet 40 milliGRAM(s) Oral before breakfast  polyethylene glycol 3350 17 Gram(s) Oral daily  senna 2 Tablet(s) Oral at bedtime         LABS:  CBC Full  -  ( 02 Jun 2022 05:39 )  WBC Count : 7.37 K/uL  RBC Count : 2.67 M/uL  Hemoglobin : 7.9 g/dL  Hematocrit : 24.7 %  Platelet Count - Automated : 216 K/uL  Mean Cell Volume : 92.5 fl  Mean Cell Hemoglobin : 29.6 pg  Mean Cell Hemoglobin Concentration : 32.0 gm/dL  Auto Neutrophil # : x  Auto Lymphocyte # : x  Auto Monocyte # : x  Auto Eosinophil # : x  Auto Basophil # : x  Auto Neutrophil % : x  Auto Lymphocyte % : x  Auto Monocyte % : x  Auto Eosinophil % : x  Auto Basophil % : x      06-02    138  |  102  |  18.4  ----------------------------<  124<H>  4.7   |  25.0  |  0.52    Ca    8.2<L>      02 Jun 2022 05:39      On Neurological Examination:    Mental Status - Patient is  awake, alert and oriented X3.  Speech is fluent. Patient can name, repeat and follow commands correctly  There is no dysarthria.    Cranial Nerves - PERRL, EOMI,  Visual fields are full to finger counting,   There is a left facial weakness ( history of Left Bell's Palsy)  Motor Exam -   Right upper ---5/5 No drift  Left upper ---5/5 No drift  Right lower ---5/5 No drift  Left lower  ---5/5 No drift     nml bulk/tone    Sensory    Intact to light touch and pinprick bilaterally    Coord: FTN intact bilaterally     Gait -  Not assessed.     RADIOLOGY ( All neurological imaging studies were independently reviewed and interpreted by me)  CTH   CTA  CTP   CT Head No Cont (05.28.22 @ 18:23) >    IMPRESSION:  1. Stable appearance of previously visualized right posterior   parietal/occipital intraparenchymal hematoma. Mild surrounding edema.  2. No evidence of an acute large arterial distribution infarct.    Findings were discussed between Dr. Behr-Ventura and DAVID Stephens   at approximately 6:20 PM 5/28/2022.    DAWN M BEHR-VENTURA MD; Attending Radiologist      MRI:      TTE   TTE Echo Complete w/ Contrast w/ Doppler (05.25.22 @ 17:51) >    Summary:   1. Left ventricular ejection fraction, by visual estimation, is 60 to   65%.   2. Normal global left ventricular systolic function.   3. Spectral Doppler shows impaired relaxation pattern of left   ventricular myocardial filling (Grade I diastolic dysfunction).   4. Mildly enlarged right ventricle with normal RV systolic function.   5. Estimated pulmonary artery systolic pressure is 39.7 mmHg assuming a   right atrial pressure of 3 mmHg, which is consistent with borderline   pulmonary hypertension.   6. Mildly enlarged left atrium.   7. Right atrial enlargement.   8. Mild mitral annular calcification.   9. Moderate mitral valve regurgitation.  10. Mild-moderate tricuspid regurgitation.  11. Mild aortic regurgitation.  12. Sclerotic aortic valve with normal opening.  13. There is mild aortic root calcification.  14. There is no evidence of pericardial effusion.    Ji CanYang, DO Electronically signed on 5/25/2022 at 8:10:04 PM        MR Head No Cont (06.02.22 @ 13:55) >  IMPRESSION:  Limited by motion and patient's inability to complete exam.  Small acute to subacute parenchymal hemorrhage right occipital lobe again   seen.  No gross abnormal flow related enhancement associated with the   parenchymal hemorrhage.    GAIL QUIÑONES MD; Attending Radiologist                                                                continuous EEG 5/29/22- 5/30/22   EEG IMPRESSION/CLINICAL CORRELATE    Abnormal EEG study.    - Presence of generalized periodic discharges (GPDs) with triphasic morphology is indicative of severe metabolic encephalopathy, but can be considered epileptiform in the right clinical context. Improving burden in the last quarter of recording.  - functional abnormality in the right parietal region.  - Moderate nonspecific diffuse or multifocal cerebral dysfunction.   - No seizures were recorded.    continuous EEG 5/30/22- 5/31/22  EEG SUMMARY/CLASSIFICATION  Abnormal EEG in an encephalopathic patient.  - Occasional intermittent GPDs up to 1Hz, at times with shifting asymmetries and triphasic morphology.  Less frequent vs prior day.  - Delta slightly more prominent right posteriorly  - Background slowing, generalized.    _____________________________________________________________  EEG IMPRESSION/CLINICAL CORRELATE  GPDs with triphasic morphology may be associated with an increased risk for seizure, but are typically seen in the context of a relatively moderate to severe metabolic encephalopathic process.    GPDs less prominent vs prior day.  Right sided cerebral dysfunction, subtle.  No seizures x 2 days.  In absence of additional clinical concerns, recommend consideration for discontinuation of current EEG study with reconnection in future if warranted.

## 2022-06-03 NOTE — PROGRESS NOTE ADULT - SUBJECTIVE AND OBJECTIVE BOX
Patient seen this morning. No events overnight. MRA was performed but further imaging was deferred as it would not . 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 She also reports intermittent posterior headache if she sleeps flat on the back of her head.    REVIEW OF SYSTEMS  Constitutional - No fever,  + fatigue  Neurological - + headaches, + loss of strength  Musculoskeletal - + joint pain    FUNCTIONAL PROGRESS  6/2 PT  Bed Mobility  Bed Mobility Training Sit-to-Supine: minimum assist (75% patient effort);  1 person assist;  nonverbal cues (demo/gestures);  verbal cues  Bed Mobility Training Supine-to-Sit: minimum assist (75% patient effort);  1 person assist;  verbal cues;  nonverbal cues (demo/gestures)  Bed Mobility Training Limitations: decreased strength;  impaired balance;  decreased flexibility;  impaired coordination    Sit-Stand Transfer Training  Transfer Training Sit-to-Stand Transfer: minimum assist (75% patient effort);  1 person assist;  nonverbal cues (demo/gestures);  verbal cues;  rolling walker  Transfer Training Stand-to-Sit Transfer: minimum assist (75% patient effort);  1 person assist;  nonverbal cues (demo/gestures);  verbal cues;  rolling walker  Sit-to-Stand Transfer Training Transfer Safety Analysis: impaired balance;  impaired coordination;  impaired motor control;  impaired postural control;  decreased strength;  rolling walker    Gait Training  Gait Training: minimum assist (75% patient effort);  1 person assist;  nonverbal cues (demo/gestures);  verbal cues;  rolling walker;  35ft, poor obstacle negotiation and increase in loss of balance during turns  Gait Analysis: 3-point gait   decreased rajiv;  decreased step length;  decreased stride length;  impaired balance;  impaired coordination;  impaired motor control;  impaired postural control;  40ft;  rolling walker    VITALS  T(C): 36.7 (06-03-22 @ 08:41), Max: 36.8 (06-02-22 @ 22:47)  HR: 80 (06-03-22 @ 08:41) (77 - 88)  BP: 107/50 (06-03-22 @ 08:41) (107/50 - 134/60)  RR: 18 (06-03-22 @ 08:41) (16 - 18)  SpO2: 95% (06-03-22 @ 08:41) (94% - 97%)  Wt(kg): --    MEDICATIONS   acetaminophen     Tablet .. 650 milliGRAM(s) every 6 hours PRN  ascorbic acid 500 milliGRAM(s) daily  chlorhexidine 2% Cloths 1 Application(s) daily  dextrose 5%. 1000 milliLiter(s) <Continuous>  dextrose 5%. 1000 milliLiter(s) <Continuous>  dextrose 50% Injectable 25 Gram(s) once  dextrose 50% Injectable 12.5 Gram(s) once  dextrose 50% Injectable 25 Gram(s) once  dextrose Oral Gel 15 Gram(s) once PRN  enoxaparin Injectable 40 milliGRAM(s) <User Schedule>  ferrous    sulfate 325 milliGRAM(s) daily  glucagon  Injectable 1 milliGRAM(s) once  hydrALAZINE Injectable 10 milliGRAM(s) every 2 hours PRN  insulin lispro (ADMELOG) corrective regimen sliding scale   three times a day before meals  insulin lispro (ADMELOG) corrective regimen sliding scale   at bedtime  labetalol Injectable 10 milliGRAM(s) every 2 hours PRN  lacosamide 100 milliGRAM(s) two times a day  levothyroxine 50 MICROGram(s) daily  multivitamin 1 Tablet(s) daily  ondansetron Injectable 4 milliGRAM(s) every 6 hours PRN  pantoprazole    Tablet 40 milliGRAM(s) before breakfast  polyethylene glycol 3350 17 Gram(s) daily  senna 2 Tablet(s) at bedtime      RECENT LABS/IMAGING                          7.9    7.37  )-----------( 216      ( 02 Jun 2022 05:39 )             24.7     06-02    138  |  102  |  18.4  ----------------------------<  124<H>  4.7   |  25.0  |  0.52    Ca    8.2<L>      02 Jun 2022 05:39        HEAD CT 5/25 - An approximately 2.7 x 2.2 x 3.2 cm right parietal intraparenchymal hemorrhage is slightly increased in size from 2.4 x 2 x 3.2 cm on 05/22/2022 at 3:13 PM and 2.2 x 1.2 x 2 cm and 05/25/2022 at 8:54 AM. Trace right parietal subarachnoid hemorrhage and trace subdural hemorrhage along the right cerebellar tentorium are stable.    HEAD CT 5/26 - No significant interval change from CT brain 5/25/2022 obtained at 10:19 PM    HEAD CT 5/28 - 1. Stable appearance of previously visualized right posterior parietal/occipital intraparenchymal hematoma. Mild surrounding edema. 2. No evidence of an acute large arterial distribution infarct.    CXR 5/28 - Unremarkable frontal chest x ray    EEG 5/30 - Abnormal EEG study.   - Presence of generalized periodic discharges (GPDs) with triphasic morphology is indicative of severe metabolic encephalopathy, but can be considered epileptiform in the right clinical context. Improving burden in the last quarter of recording.  - functional abnormality in the right parietal region.  - Moderate nonspecific diffuse or multifocal cerebral dysfunction.   - No seizures were recorded.    EEG 5/31 - GPDs with triphasic morphology may be associated with an increased risk for seizure, but are typically seen in the context of a relatively moderate to severe metabolic encephalopathic process.    GPDs less prominent vs prior day.  Right sided cerebral dysfunction, subtle.  No seizures x 2 days. In absence of additional clinical concerns, recommend consideration for discontinuation of current EEG study with reconnection in future if warranted.    MR Head 6/2: Limited by motion and patient's inability to complete exam. Small acute to subacute parenchymal hemorrhage right occipital lobe again seen. No gross abnormal flow related enhancement associated with the parenchymal hemorrhage.      ----------------------------------------------------------------------------------------  PHYSICAL EXAM  Constitutional - NAD, Comfortable  Neck - Supple, +limited ROM due to neck/head pain  Extremities - No C/C/E, No calf tenderness   Neurologic Exam -                    Cognitive - AAO to self, place, PART situation     Communication - Fluent, No dysarthria     Cranial Nerves - Left lower quadrant visual field cut, Left peripheral droop - mild     Motor - No focal deficits       Sensory - Intact to LT  Psychiatric - Mood stable, Calm   ----------------------------------------------------------------------------------------  ASSESSMENT/PLAN  89yFemale with functional deficits after a mechanical fall sustaining an IPH  Right parietal IPH - Vimpat   HTN - Hydralazine, Labetalol  Pain - Tylenol  DVT PPX - SCDs, Lovenox  Rehab - Patient is appropriate for acute inpatient rehabilitation, but family wishes to pursue ELA at this time.     Will continue to follow. Rehab recommendations are dependent on how functional progress changes as well as how patient continues to participate and tolerate therapeutic interventions, which may change. Recommend ongoing mobilization by staff to maintain cardiopulmonary function and prevention of secondary complications related to debility. Discussed with rehab team.

## 2022-06-04 LAB
ANION GAP SERPL CALC-SCNC: 15 MMOL/L — SIGNIFICANT CHANGE UP (ref 5–17)
BUN SERPL-MCNC: 30.7 MG/DL — HIGH (ref 8–20)
CALCIUM SERPL-MCNC: 7.9 MG/DL — LOW (ref 8.6–10.2)
CHLORIDE SERPL-SCNC: 106 MMOL/L — SIGNIFICANT CHANGE UP (ref 98–107)
CO2 SERPL-SCNC: 21 MMOL/L — LOW (ref 22–29)
CREAT SERPL-MCNC: 0.64 MG/DL — SIGNIFICANT CHANGE UP (ref 0.5–1.3)
EGFR: 81 ML/MIN/1.73M2 — SIGNIFICANT CHANGE UP
GLUCOSE BLDC GLUCOMTR-MCNC: 131 MG/DL — HIGH (ref 70–99)
GLUCOSE BLDC GLUCOMTR-MCNC: 146 MG/DL — HIGH (ref 70–99)
GLUCOSE BLDC GLUCOMTR-MCNC: 196 MG/DL — HIGH (ref 70–99)
GLUCOSE SERPL-MCNC: 126 MG/DL — HIGH (ref 70–99)
HCT VFR BLD CALC: 23.4 % — LOW (ref 34.5–45)
HGB BLD-MCNC: 7.3 G/DL — LOW (ref 11.5–15.5)
MCHC RBC-ENTMCNC: 29.7 PG — SIGNIFICANT CHANGE UP (ref 27–34)
MCHC RBC-ENTMCNC: 31.2 GM/DL — LOW (ref 32–36)
MCV RBC AUTO: 95.1 FL — SIGNIFICANT CHANGE UP (ref 80–100)
PLATELET # BLD AUTO: 250 K/UL — SIGNIFICANT CHANGE UP (ref 150–400)
POTASSIUM SERPL-MCNC: 4.3 MMOL/L — SIGNIFICANT CHANGE UP (ref 3.5–5.3)
POTASSIUM SERPL-SCNC: 4.3 MMOL/L — SIGNIFICANT CHANGE UP (ref 3.5–5.3)
RBC # BLD: 2.46 M/UL — LOW (ref 3.8–5.2)
RBC # FLD: 15.9 % — HIGH (ref 10.3–14.5)
SODIUM SERPL-SCNC: 142 MMOL/L — SIGNIFICANT CHANGE UP (ref 135–145)
WBC # BLD: 8.96 K/UL — SIGNIFICANT CHANGE UP (ref 3.8–10.5)
WBC # FLD AUTO: 8.96 K/UL — SIGNIFICANT CHANGE UP (ref 3.8–10.5)

## 2022-06-04 PROCEDURE — 99232 SBSQ HOSP IP/OBS MODERATE 35: CPT

## 2022-06-04 PROCEDURE — 70450 CT HEAD/BRAIN W/O DYE: CPT | Mod: 26

## 2022-06-04 RX ORDER — IRON SUCROSE 20 MG/ML
100 INJECTION, SOLUTION INTRAVENOUS EVERY 24 HOURS
Refills: 0 | Status: COMPLETED | OUTPATIENT
Start: 2022-06-04 | End: 2022-06-06

## 2022-06-04 RX ORDER — OXYCODONE AND ACETAMINOPHEN 5; 325 MG/1; MG/1
0.5 TABLET ORAL ONCE
Refills: 0 | Status: DISCONTINUED | OUTPATIENT
Start: 2022-06-04 | End: 2022-06-04

## 2022-06-04 RX ORDER — ACETAMINOPHEN 500 MG
750 TABLET ORAL ONCE
Refills: 0 | Status: COMPLETED | OUTPATIENT
Start: 2022-06-04 | End: 2022-06-04

## 2022-06-04 RX ADMIN — Medication 325 MILLIGRAM(S): at 17:14

## 2022-06-04 RX ADMIN — Medication 650 MILLIGRAM(S): at 14:35

## 2022-06-04 RX ADMIN — Medication 650 MILLIGRAM(S): at 05:40

## 2022-06-04 RX ADMIN — Medication 1 TABLET(S): at 17:14

## 2022-06-04 RX ADMIN — POLYETHYLENE GLYCOL 3350 17 GRAM(S): 17 POWDER, FOR SOLUTION ORAL at 17:15

## 2022-06-04 RX ADMIN — PANTOPRAZOLE SODIUM 40 MILLIGRAM(S): 20 TABLET, DELAYED RELEASE ORAL at 04:43

## 2022-06-04 RX ADMIN — Medication 650 MILLIGRAM(S): at 21:32

## 2022-06-04 RX ADMIN — Medication 50 MICROGRAM(S): at 04:43

## 2022-06-04 RX ADMIN — Medication 500 MILLIGRAM(S): at 17:14

## 2022-06-04 RX ADMIN — OXYCODONE AND ACETAMINOPHEN 0.5 TABLET(S): 5; 325 TABLET ORAL at 23:49

## 2022-06-04 RX ADMIN — LACOSAMIDE 100 MILLIGRAM(S): 50 TABLET ORAL at 04:41

## 2022-06-04 RX ADMIN — IRON SUCROSE 210 MILLIGRAM(S): 20 INJECTION, SOLUTION INTRAVENOUS at 14:39

## 2022-06-04 RX ADMIN — LACOSAMIDE 100 MILLIGRAM(S): 50 TABLET ORAL at 17:14

## 2022-06-04 RX ADMIN — Medication 650 MILLIGRAM(S): at 22:32

## 2022-06-04 RX ADMIN — Medication 650 MILLIGRAM(S): at 04:42

## 2022-06-04 RX ADMIN — Medication 300 MILLIGRAM(S): at 10:47

## 2022-06-04 RX ADMIN — ENOXAPARIN SODIUM 40 MILLIGRAM(S): 100 INJECTION SUBCUTANEOUS at 17:15

## 2022-06-04 RX ADMIN — CHLORHEXIDINE GLUCONATE 1 APPLICATION(S): 213 SOLUTION TOPICAL at 17:36

## 2022-06-04 RX ADMIN — ONDANSETRON 4 MILLIGRAM(S): 8 TABLET, FILM COATED ORAL at 10:47

## 2022-06-04 NOTE — PROGRESS NOTE ADULT - SUBJECTIVE AND OBJECTIVE BOX
Vascular Neurology      ELLISHAHLA FLEMING 89y Female     Patient is a 89y old  Female who presents with a chief complaint of transfer from [facility], s/p fall with [facility] (01 Jun 2022 15:26)      HPI:  Patient is a 89 y/o female transfer from [facility] s/p trip and fall. Imaging studies at [place] revealed right intraparenchymal hemorrhage prompting transfer to [facility] for neurosurgical evaluation & management. On arrival patient with no acute complaints. States she tripped and fell, no loss of consciousness, reports she takes a baby aspirin daily. No other reported anticoagulation / antiplatelet use. Airway: intact, c-collar in place on arrival. Breathing: breath sounds CTA b/l, no accessory muscle use, no conversational dyspnea. Circulation: 2+ central & peripheral pulses b/l. Disability: pupils 2mm round and reactive to light b/l, GCS15. Exposure: fully exposed and covered w/ warm blankets. CXR & pelvis XR performed without obvious acute traumatic findings. PIV placed by RN and labs drawn. istat pH 7.45, base excess +5, Hgb 9.9. (Hgb 10.3 on blood work from [facility]).    Imaging studies from [facility] reviewed with Dr. Conley. CT head showed R intraparenchymal hemorrhage. CT cervical spine without acute findings, degenerative changes noted. Neurosurgical consult placed @ 1045. After physical exam and pt without neuro deficits / distracting injuries, cervical collar cleared by resident MD Taylor.  (25 May 2022 10:47)      PMH: Diabetes mellitus    Hyperlipidemia    Hypothyroidism         PSH: S/P cholecystectomy      FAMILY HISTORY:  No pertinent family history in first degree relatives    SOCIAL HISTORY:  No history of tobacco or alcohol use     Allergies    Celebrex (Unknown)  codeine (Unknown)  iodine (Unknown)    Intolerances        Vital Signs Last 24 Hrs  T(C): 36.8 (04 Jun 2022 04:40), Max: 36.8 (04 Jun 2022 04:40)  T(F): 98.2 (04 Jun 2022 04:40), Max: 98.2 (04 Jun 2022 04:40)  HR: 89 (04 Jun 2022 09:50) (87 - 109)  BP: 135/79 (04 Jun 2022 09:50) (115/50 - 135/79)  BP(mean): --  RR: 18 (04 Jun 2022 04:40) (18 - 18)  SpO2: 97% (04 Jun 2022 04:40) (95% - 97%)    MEDICATIONS    acetaminophen     Tablet .. 650 milliGRAM(s) Oral every 6 hours PRN  ascorbic acid 500 milliGRAM(s) Oral daily  chlorhexidine 2% Cloths 1 Application(s) Topical daily  dextrose 5%. 1000 milliLiter(s) IV Continuous <Continuous>  dextrose 5%. 1000 milliLiter(s) IV Continuous <Continuous>  dextrose 50% Injectable 25 Gram(s) IV Push once  dextrose 50% Injectable 12.5 Gram(s) IV Push once  dextrose 50% Injectable 25 Gram(s) IV Push once  dextrose Oral Gel 15 Gram(s) Oral once PRN  enoxaparin Injectable 40 milliGRAM(s) SubCutaneous <User Schedule>  ferrous    sulfate 325 milliGRAM(s) Oral daily  glucagon  Injectable 1 milliGRAM(s) IntraMuscular once  hydrALAZINE Injectable 10 milliGRAM(s) IV Push every 2 hours PRN  insulin lispro (ADMELOG) corrective regimen sliding scale   SubCutaneous three times a day before meals  insulin lispro (ADMELOG) corrective regimen sliding scale   SubCutaneous at bedtime  iron sucrose IVPB 100 milliGRAM(s) IV Intermittent every 24 hours  labetalol Injectable 10 milliGRAM(s) IV Push every 2 hours PRN  lacosamide 100 milliGRAM(s) Oral two times a day  levothyroxine 50 MICROGram(s) Oral daily  multivitamin 1 Tablet(s) Oral daily  ondansetron Injectable 4 milliGRAM(s) IV Push every 6 hours PRN  pantoprazole    Tablet 40 milliGRAM(s) Oral before breakfast  polyethylene glycol 3350 17 Gram(s) Oral daily  senna 2 Tablet(s) Oral at bedtime         LABS:  CBC Full  -  ( 04 Jun 2022 06:25 )  WBC Count : 8.96 K/uL  RBC Count : 2.46 M/uL  Hemoglobin : 7.3 g/dL  Hematocrit : 23.4 %  Platelet Count - Automated : 250 K/uL  Mean Cell Volume : 95.1 fl  Mean Cell Hemoglobin : 29.7 pg  Mean Cell Hemoglobin Concentration : 31.2 gm/dL  Auto Neutrophil # : x  Auto Lymphocyte # : x  Auto Monocyte # : x  Auto Eosinophil # : x  Auto Basophil # : x  Auto Neutrophil % : x  Auto Lymphocyte % : x  Auto Monocyte % : x  Auto Eosinophil % : x  Auto Basophil % : x      06-04    142  |  106  |  30.7<H>  ----------------------------<  126<H>  4.3   |  21.0<L>  |  0.64    Ca    7.9<L>      04 Jun 2022 06:25        On Neurological Examination:    Mental Status - Patient is  awake, alert and oriented X3.  Speech is fluent. Patient can name, repeat and follow commands correctly  There is no dysarthria.    Cranial Nerves - PERRL, EOMI,  Visual fields are full to finger counting,   There is a left facial weakness ( history of Left Bell's Palsy)  Motor Exam -   Right upper ---5/5 No drift  Left upper ---5/5 No drift  Right lower ---5/5 No drift  Left lower  ---5/5 No drift     nml bulk/tone    Sensory    Intact to light touch and pinprick bilaterally    Coord: FTN intact bilaterally     Gait -  Not assessed.     RADIOLOGY ( All neurological imaging studies were independently reviewed and interpreted by me)  CTH   CTA  CTP   CT Head No Cont (05.28.22 @ 18:23) >    IMPRESSION:  1. Stable appearance of previously visualized right posterior   parietal/occipital intraparenchymal hematoma. Mild surrounding edema.  2. No evidence of an acute large arterial distribution infarct.    Findings were discussed between Dr. Behr-Ventura and DAVID Stephens   at approximately 6:20 PM 5/28/2022.    DAWN M BEHR-VENTURA MD; Attending Radiologist      MRI:      TTE   TTE Echo Complete w/ Contrast w/ Doppler (05.25.22 @ 17:51) >    Summary:   1. Left ventricular ejection fraction, by visual estimation, is 60 to   65%.   2. Normal global left ventricular systolic function.   3. Spectral Doppler shows impaired relaxation pattern of left   ventricular myocardial filling (Grade I diastolic dysfunction).   4. Mildly enlarged right ventricle with normal RV systolic function.   5. Estimated pulmonary artery systolic pressure is 39.7 mmHg assuming a   right atrial pressure of 3 mmHg, which is consistent with borderline   pulmonary hypertension.   6. Mildly enlarged left atrium.   7. Right atrial enlargement.   8. Mild mitral annular calcification.   9. Moderate mitral valve regurgitation.  10. Mild-moderate tricuspid regurgitation.  11. Mild aortic regurgitation.  12. Sclerotic aortic valve with normal opening.  13. There is mild aortic root calcification.  14. There is no evidence of pericardial effusion.    Collins Castaneda DO Electronically signed on 5/25/2022 at 8:10:04 PM        MR Head No Cont (06.02.22 @ 13:55) >  IMPRESSION:  Limited by motion and patient's inability to complete exam.  Small acute to subacute parenchymal hemorrhage right occipital lobe again   seen.  No gross abnormal flow related enhancement associated with the   parenchymal hemorrhage.    GAIL QUIÑONES MD; Attending Radiologist                                                                continuous EEG 5/29/22- 5/30/22   EEG IMPRESSION/CLINICAL CORRELATE    Abnormal EEG study.    - Presence of generalized periodic discharges (GPDs) with triphasic morphology is indicative of severe metabolic encephalopathy, but can be considered epileptiform in the right clinical context. Improving burden in the last quarter of recording.  - functional abnormality in the right parietal region.  - Moderate nonspecific diffuse or multifocal cerebral dysfunction.   - No seizures were recorded.    continuous EEG 5/30/22- 5/31/22  EEG SUMMARY/CLASSIFICATION  Abnormal EEG in an encephalopathic patient.  - Occasional intermittent GPDs up to 1Hz, at times with shifting asymmetries and triphasic morphology.  Less frequent vs prior day.  - Delta slightly more prominent right posteriorly  - Background slowing, generalized.    _____________________________________________________________  EEG IMPRESSION/CLINICAL CORRELATE  GPDs with triphasic morphology may be associated with an increased risk for seizure, but are typically seen in the context of a relatively moderate to severe metabolic encephalopathic process.    GPDs less prominent vs prior day.  Right sided cerebral dysfunction, subtle.  No seizures x 2 days.  In absence of additional clinical concerns, recommend consideration for discontinuation of current EEG study with reconnection in future if warranted.

## 2022-06-04 NOTE — PROGRESS NOTE ADULT - ASSESSMENT
Patient is a 89 y/o female with history of DM, HLD, Hypothyroidism, Arthritis, independent at baseline and still drives who [place] [facility] [place] after sustaining a mechanical fall and hitting the back of her head. Hospital course complicated by an acute episode of altered mentation and aphasia on 5/28 for which VEEG and MRI were ordered. Patient is now AAO x 4 and stable for downgrade to the hospitalist service for further management.    1. Traumatic Right Posterior Parietal/ Occipital IPH  -repeat CT head on 5/28 with stable bleed  -MRI/ MRA- no change.  -cleared to start DVT ppx  -Vimpat for seizure prophylaxis (switched from Keppra)  -PT evaluation - Acute rehab   PMR accepted.  -Neurosurgery follow up noted    2. Episode of AMS,  + aphasia - resolved  -code stroke on 5/28 but not a candidate for tPA given IPH  -VEEG - negative for Seizures  -continue Vimpat for prophylaxis  -seizure precautions  -Neuro and Epilepsy following    3. DM2 controlled  -on sliding scale  ISS  -hold Natiglinide while inpatient      4. HLD  -continue lipitor    5. Hypothyroidism  -TSH WNL  -continue synthroid    6. Constipation- resolved  -continue senna/ miralax    7. Anemia likely due to iron deficiency   -Hb 7.7   will add iv vneofer   hb in am if < 8.5 will tx to optimize   -iron studies reviewed       8. Mild Hyponatremia  -sodium improved from 130 --> 134 s/p NS   -liberalize sodium in diet  -repeat BMP in am     10. Arthritis  -X rays- OA  -continue Tylenol as needed; avoid narcotics  -PT evaluation; OOB to chair    [facility]     Dispo- [facility]e [facility] rehab. D/W son Kirk. he prefers [facility] because of location.   D/W    remove staples tomorrow or prior to discharge. order placed

## 2022-06-04 NOTE — PROGRESS NOTE ADULT - SUBJECTIVE AND OBJECTIVE BOX
Patient is a 89y old  Female who presents with a chief complaint of transfer from [facility] [place], s/p fall with [facility] (03 Jun 2022 18:04)      Patient seen and examined at bedside in am , c/o severe frontal and back headache   given tylenol in am po no relieves   pt is now feeling better during my second visit around 2 pm , given dose of iv tylenol mid morning     sitting in the bed having lunch      ALLERGIES:  Celebrex (Unknown)  codeine (Unknown)  iodine (Unknown)    MEDICATIONS  (STANDING):  ascorbic acid 500 milliGRAM(s) Oral daily  chlorhexidine 2% Cloths 1 Application(s) Topical daily  dextrose 5%. 1000 milliLiter(s) (50 mL/Hr) IV Continuous <Continuous>  dextrose 5%. 1000 milliLiter(s) (100 mL/Hr) IV Continuous <Continuous>  dextrose 50% Injectable 25 Gram(s) IV Push once  dextrose 50% Injectable 12.5 Gram(s) IV Push once  dextrose 50% Injectable 25 Gram(s) IV Push once  enoxaparin Injectable 40 milliGRAM(s) SubCutaneous <User Schedule>  ferrous    sulfate 325 milliGRAM(s) Oral daily  glucagon  Injectable 1 milliGRAM(s) IntraMuscular once  insulin lispro (ADMELOG) corrective regimen sliding scale   SubCutaneous three times a day before meals  insulin lispro (ADMELOG) corrective regimen sliding scale   SubCutaneous at bedtime  iron sucrose IVPB 100 milliGRAM(s) IV Intermittent every 24 hours  lacosamide 100 milliGRAM(s) Oral two times a day  levothyroxine 50 MICROGram(s) Oral daily  multivitamin 1 Tablet(s) Oral daily  pantoprazole    Tablet 40 milliGRAM(s) Oral before breakfast  polyethylene glycol 3350 17 Gram(s) Oral daily  senna 2 Tablet(s) Oral at bedtime    MEDICATIONS  (PRN):  acetaminophen     Tablet .. 650 milliGRAM(s) Oral every 6 hours PRN Temp greater or equal to 38C (100.4F), Mild Pain (1 - 3)  dextrose Oral Gel 15 Gram(s) Oral once PRN Blood Glucose LESS THAN 70 milliGRAM(s)/deciliter  hydrALAZINE Injectable 10 milliGRAM(s) IV Push every 2 hours PRN SBP > 140  labetalol Injectable 10 milliGRAM(s) IV Push every 2 hours PRN Systolic blood pressure > 140  ondansetron Injectable 4 milliGRAM(s) IV Push every 6 hours PRN Nausea and/or Vomiting    Vital Signs Last 24 Hrs  T(F): 98.2 (04 Jun 2022 04:40), Max: 98.2 (04 Jun 2022 04:40)  HR: 89 (04 Jun 2022 09:50) (83 - 109)  BP: 135/79 (04 Jun 2022 09:50) (108/51 - 135/79)  RR: 18 (04 Jun 2022 04:40) (18 - 18)  SpO2: 97% (04 Jun 2022 04:40) (94% - 97%)  I&O's Summary    03 Jun 2022 07:01  -  04 Jun 2022 07:00  --------------------------------------------------------  IN: 480 mL / OUT: 500 mL / NET: -20 mL        PHYSICAL EXAM:  General: awake alert , mild distress due to pain   ENT: mosit   Neck: supple, no JVD   Lungs: CTA bilateral   Cardio: RRR, S1/S2, No murmur  Abdomen: Soft, Nontender, Nondistended; Bowel sounds present  Extremities: No calf tenderness, No pitting edema    LABS:                        7.3    8.96  )-----------( 250      ( 04 Jun 2022 06:25 )             23.4     06-04    142  |  106  |  30.7  ----------------------------<  126  4.3   |  21.0  |  0.64    Ca    7.9      04 Jun 2022 06:25                      05-26 Chol 109 mg/dL LDL -- HDL 49 mg/dL Trig 86 mg/dL              POCT Blood Glucose.: 131 mg/dL (04 Jun 2022 08:00)  POCT Blood Glucose.: 151 mg/dL (03 Jun 2022 21:26)  POCT Blood Glucose.: 155 mg/dL (03 Jun 2022 17:10)          COVID-19 PCR: NotDetec (06-01-22 @ 07:44)  COVID-19 PCR: NotDetec (05-25-22 @ 12:29)    RADIOLOGY & ADDITIONAL TESTS:

## 2022-06-04 NOTE — PROGRESS NOTE ADULT - ASSESSMENT
IMPRESSION:    Right Occipital IPH        ASSESSMENT/ PLAN:     - Neuro checks and vital signs Q 2 hours.  - SBP goal less than 140 mm Hg.  -  MRI Brain  and MRA -images and reports were reviewed.    - On Vimpat for seizure prophylaxis   - PT OT following  - SCD/ SQ Lovenox for DVT prophylaxis.

## 2022-06-05 LAB
BASOPHILS # BLD AUTO: 0 K/UL — SIGNIFICANT CHANGE UP (ref 0–0.2)
BASOPHILS NFR BLD AUTO: 0 % — SIGNIFICANT CHANGE UP (ref 0–2)
BLD GP AB SCN SERPL QL: SIGNIFICANT CHANGE UP
DIR ANTIGLOB POLYSPECIFIC INTERPRETATION: ABNORMAL
EOSINOPHIL # BLD AUTO: 0.22 K/UL — SIGNIFICANT CHANGE UP (ref 0–0.5)
EOSINOPHIL NFR BLD AUTO: 3 % — SIGNIFICANT CHANGE UP (ref 0–6)
GLUCOSE BLDC GLUCOMTR-MCNC: 117 MG/DL — HIGH (ref 70–99)
GLUCOSE BLDC GLUCOMTR-MCNC: 139 MG/DL — HIGH (ref 70–99)
GLUCOSE BLDC GLUCOMTR-MCNC: 156 MG/DL — HIGH (ref 70–99)
GLUCOSE BLDC GLUCOMTR-MCNC: 161 MG/DL — HIGH (ref 70–99)
HCT VFR BLD CALC: 26.7 % — LOW (ref 34.5–45)
HGB BLD-MCNC: 8.2 G/DL — LOW (ref 11.5–15.5)
LYMPHOCYTES # BLD AUTO: 0.79 K/UL — LOW (ref 1–3.3)
LYMPHOCYTES # BLD AUTO: 11 % — LOW (ref 13–44)
MANUAL SMEAR VERIFICATION: SIGNIFICANT CHANGE UP
MCHC RBC-ENTMCNC: 29.4 PG — SIGNIFICANT CHANGE UP (ref 27–34)
MCHC RBC-ENTMCNC: 30.7 GM/DL — LOW (ref 32–36)
MCV RBC AUTO: 95.7 FL — SIGNIFICANT CHANGE UP (ref 80–100)
METAMYELOCYTES # FLD: 1 % — HIGH (ref 0–0)
MONOCYTES # BLD AUTO: 0.86 K/UL — SIGNIFICANT CHANGE UP (ref 0–0.9)
MONOCYTES NFR BLD AUTO: 12 % — SIGNIFICANT CHANGE UP (ref 2–14)
MYELOCYTES NFR BLD: 1 % — HIGH (ref 0–0)
NEUTROPHILS # BLD AUTO: 5.09 K/UL — SIGNIFICANT CHANGE UP (ref 1.8–7.4)
NEUTROPHILS NFR BLD AUTO: 70 % — SIGNIFICANT CHANGE UP (ref 43–77)
NEUTS BAND # BLD: 1 % — SIGNIFICANT CHANGE UP (ref 0–8)
NRBC # BLD: 0 /100 — SIGNIFICANT CHANGE UP (ref 0–0)
PLAT MORPH BLD: NORMAL — SIGNIFICANT CHANGE UP
PLATELET # BLD AUTO: 255 K/UL — SIGNIFICANT CHANGE UP (ref 150–400)
PROMYELOCYTES # FLD: 1 % — HIGH (ref 0–0)
RBC # BLD: 2.79 M/UL — LOW (ref 3.8–5.2)
RBC # FLD: 16.1 % — HIGH (ref 10.3–14.5)
RBC BLD AUTO: NORMAL — SIGNIFICANT CHANGE UP
WBC # BLD: 7.17 K/UL — SIGNIFICANT CHANGE UP (ref 3.8–10.5)
WBC # FLD AUTO: 7.17 K/UL — SIGNIFICANT CHANGE UP (ref 3.8–10.5)

## 2022-06-05 PROCEDURE — 99233 SBSQ HOSP IP/OBS HIGH 50: CPT

## 2022-06-05 RX ORDER — DEXAMETHASONE 0.5 MG/5ML
4 ELIXIR ORAL EVERY 12 HOURS
Refills: 0 | Status: DISCONTINUED | OUTPATIENT
Start: 2022-06-05 | End: 2022-06-06

## 2022-06-05 RX ORDER — TRAMADOL HYDROCHLORIDE 50 MG/1
25 TABLET ORAL ONCE
Refills: 0 | Status: DISCONTINUED | OUTPATIENT
Start: 2022-06-05 | End: 2022-06-05

## 2022-06-05 RX ORDER — SODIUM CHLORIDE 9 MG/ML
500 INJECTION INTRAMUSCULAR; INTRAVENOUS; SUBCUTANEOUS ONCE
Refills: 0 | Status: DISCONTINUED | OUTPATIENT
Start: 2022-06-05 | End: 2022-06-05

## 2022-06-05 RX ORDER — OXYCODONE HYDROCHLORIDE 5 MG/1
2.5 TABLET ORAL EVERY 6 HOURS
Refills: 0 | Status: DISCONTINUED | OUTPATIENT
Start: 2022-06-05 | End: 2022-06-07

## 2022-06-05 RX ORDER — ACETAMINOPHEN 500 MG
750 TABLET ORAL ONCE
Refills: 0 | Status: COMPLETED | OUTPATIENT
Start: 2022-06-05 | End: 2022-06-05

## 2022-06-05 RX ORDER — SODIUM CHLORIDE 9 MG/ML
250 INJECTION INTRAMUSCULAR; INTRAVENOUS; SUBCUTANEOUS ONCE
Refills: 0 | Status: DISCONTINUED | OUTPATIENT
Start: 2022-06-05 | End: 2022-06-06

## 2022-06-05 RX ORDER — SODIUM CHLORIDE 9 MG/ML
500 INJECTION INTRAMUSCULAR; INTRAVENOUS; SUBCUTANEOUS
Refills: 0 | Status: COMPLETED | OUTPATIENT
Start: 2022-06-05 | End: 2022-06-05

## 2022-06-05 RX ORDER — DEXAMETHASONE 0.5 MG/5ML
2 ELIXIR ORAL ONCE
Refills: 0 | Status: COMPLETED | OUTPATIENT
Start: 2022-06-05 | End: 2022-06-05

## 2022-06-05 RX ADMIN — IRON SUCROSE 210 MILLIGRAM(S): 20 INJECTION, SOLUTION INTRAVENOUS at 17:25

## 2022-06-05 RX ADMIN — PANTOPRAZOLE SODIUM 40 MILLIGRAM(S): 20 TABLET, DELAYED RELEASE ORAL at 05:58

## 2022-06-05 RX ADMIN — Medication 1: at 17:34

## 2022-06-05 RX ADMIN — Medication 1 TABLET(S): at 14:15

## 2022-06-05 RX ADMIN — TRAMADOL HYDROCHLORIDE 25 MILLIGRAM(S): 50 TABLET ORAL at 09:12

## 2022-06-05 RX ADMIN — SODIUM CHLORIDE 70 MILLILITER(S): 9 INJECTION INTRAMUSCULAR; INTRAVENOUS; SUBCUTANEOUS at 20:56

## 2022-06-05 RX ADMIN — Medication 50 MICROGRAM(S): at 05:58

## 2022-06-05 RX ADMIN — Medication 650 MILLIGRAM(S): at 05:56

## 2022-06-05 RX ADMIN — OXYCODONE HYDROCHLORIDE 2.5 MILLIGRAM(S): 5 TABLET ORAL at 21:06

## 2022-06-05 RX ADMIN — CHLORHEXIDINE GLUCONATE 1 APPLICATION(S): 213 SOLUTION TOPICAL at 16:55

## 2022-06-05 RX ADMIN — ENOXAPARIN SODIUM 40 MILLIGRAM(S): 100 INJECTION SUBCUTANEOUS at 17:26

## 2022-06-05 RX ADMIN — Medication 300 MILLIGRAM(S): at 14:16

## 2022-06-05 RX ADMIN — OXYCODONE HYDROCHLORIDE 2.5 MILLIGRAM(S): 5 TABLET ORAL at 20:06

## 2022-06-05 RX ADMIN — Medication 325 MILLIGRAM(S): at 14:15

## 2022-06-05 RX ADMIN — Medication 2 MILLIGRAM(S): at 14:16

## 2022-06-05 RX ADMIN — SENNA PLUS 2 TABLET(S): 8.6 TABLET ORAL at 21:01

## 2022-06-05 RX ADMIN — Medication 500 MILLIGRAM(S): at 14:15

## 2022-06-05 RX ADMIN — OXYCODONE AND ACETAMINOPHEN 0.5 TABLET(S): 5; 325 TABLET ORAL at 00:49

## 2022-06-05 RX ADMIN — Medication 650 MILLIGRAM(S): at 05:58

## 2022-06-05 NOTE — PROGRESS NOTE ADULT - ASSESSMENT
Patient is a 89 y/o female with history of DM, HLD, Hypothyroidism, Arthritis, independent at baseline and still drives [place] [facility] [place] after sustaining a mechanical fall and hitting the back of her head. Hospital course complicated by an acute episode of altered mentation and aphasia on 5/28 for which VEEG and MRI were ordered. Patient is now AAO x 4 and stable for downgrade to the hospitalist service for further management.    1. Traumatic Right Posterior Parietal/ Occipital IPH with vasogenic edema on CT scan   -repeat CT head on 5/28 with stable bleed  -Vimpat for seizure prophylaxis (switched from Keppra)  -PT evaluation - Acute rehab   PMR accepted.  -Neurosurgery signed off     2- Headache moderate   intermittent   Ct of brain stable   cont pain meds as needed     3- Episode of AMS,  + aphasia - resolved  -code stroke on 5/28 but not a candidate for tPA given IPH  -VEEG - negative for Seizures  -continue Vimpat for prophylaxis  -seizure precautions  -Neuro and Epilepsy follow up appreciated     4- DM2 controlled  -on sliding scale  ISS  -hold Natiglinide while inpatient    5. Hypothyroidism  -TSH WNL  -continue synthroid    6. Constipation- resolved  -continue senna/ miralax    7. Anemia likely due to iron deficiency   improved hb   cont iv venofer   -iron studies reviewed     8. Mild Hyponatremia  -sodium improved     9- Moderate protein hakeem malnutrition   cont ensure diet     10. Arthritis  -X rays- OA  -continue Tylenol as needed   11  -PT evaluation; OOB to chair    Lovenox     Dispo- to acute rehab   son Kirk. he prefers [facility] because of location.     remove staples prior discharge

## 2022-06-05 NOTE — PROGRESS NOTE ADULT - SUBJECTIVE AND OBJECTIVE BOX
Patient is with c/o headache on off   better today requesting tylenol before it gets worse   4/10 at present     taling tylenol every few hours last few days     d/w nurse and pt   will add tramadol one dose   avoid use of tylenol         ALLERGIES:  Celebrex (Unknown)  codeine (Unknown)  iodine (Unknown)      Vital Signs Last 24 Hrs  T(F): 97.7 (05 Jun 2022 06:00), Max: 98.2 (04 Jun 2022 16:00)  HR: 76 (05 Jun 2022 06:00) (74 - 89)  BP: 115/66 (05 Jun 2022 06:00) (115/66 - 135/79)  RR: 18 (05 Jun 2022 06:00) (17 - 18)  SpO2: 95% (05 Jun 2022 06:00) (95% - 97%)  I&O's Summary    04 Jun 2022 07:01  -  05 Jun 2022 07:00  --------------------------------------------------------  IN: 120 mL / OUT: 475 mL / NET: -355 mL        PHYSICAL EXAM:  General: awake alert , no distress   Neck: supple, no JVD   Lungs: CTA bilateral   Cardio: RRR, S1/S2, No murmur  Abdomen: Soft, Nontender, Nondistended; Bowel sounds present  Extremities: No calf tenderness, No pitting edema    LABS:                        8.2    7.17  )-----------( 255      ( 05 Jun 2022 06:16 )             26.7     06-04    142  |  106  |  30.7  ----------------------------<  126  4.3   |  21.0  |  0.64    Ca    7.9      04 Jun 2022 06:25                      05-26 Chol 109 mg/dL LDL -- HDL 49 mg/dL Trig 86 mg/dL              POCT Blood Glucose.: 196 mg/dL (04 Jun 2022 21:07)  POCT Blood Glucose.: 146 mg/dL (04 Jun 2022 17:27)          COVID-19 PCR: Hank (06-01-22 @ 07:44)  COVID-19 PCR: NotDetec (05-25-22 @ 12:29)    RADIOLOGY & ADDITIONAL TESTS:

## 2022-06-06 LAB
GLUCOSE BLDC GLUCOMTR-MCNC: 149 MG/DL — HIGH (ref 70–99)
GLUCOSE BLDC GLUCOMTR-MCNC: 158 MG/DL — HIGH (ref 70–99)
GLUCOSE BLDC GLUCOMTR-MCNC: 172 MG/DL — HIGH (ref 70–99)
GLUCOSE BLDC GLUCOMTR-MCNC: 175 MG/DL — HIGH (ref 70–99)
GLUCOSE BLDC GLUCOMTR-MCNC: 192 MG/DL — HIGH (ref 70–99)
SARS-COV-2 RNA SPEC QL NAA+PROBE: SIGNIFICANT CHANGE UP

## 2022-06-06 PROCEDURE — 99232 SBSQ HOSP IP/OBS MODERATE 35: CPT

## 2022-06-06 PROCEDURE — 99233 SBSQ HOSP IP/OBS HIGH 50: CPT | Mod: GC

## 2022-06-06 RX ORDER — DEXAMETHASONE 0.5 MG/5ML
4 ELIXIR ORAL ONCE
Refills: 0 | Status: COMPLETED | OUTPATIENT
Start: 2022-06-06 | End: 2022-06-06

## 2022-06-06 RX ORDER — SODIUM CHLORIDE 9 MG/ML
1000 INJECTION, SOLUTION INTRAVENOUS
Refills: 0 | Status: DISCONTINUED | OUTPATIENT
Start: 2022-06-06 | End: 2022-06-06

## 2022-06-06 RX ORDER — OXYCODONE AND ACETAMINOPHEN 5; 325 MG/1; MG/1
0.5 TABLET ORAL ONCE
Refills: 0 | Status: DISCONTINUED | OUTPATIENT
Start: 2022-06-06 | End: 2022-06-06

## 2022-06-06 RX ORDER — ALPRAZOLAM 0.25 MG
0.25 TABLET ORAL
Refills: 0 | Status: DISCONTINUED | OUTPATIENT
Start: 2022-06-06 | End: 2022-06-07

## 2022-06-06 RX ADMIN — Medication 1: at 14:04

## 2022-06-06 RX ADMIN — Medication 1 TABLET(S): at 10:36

## 2022-06-06 RX ADMIN — OXYCODONE HYDROCHLORIDE 2.5 MILLIGRAM(S): 5 TABLET ORAL at 19:43

## 2022-06-06 RX ADMIN — Medication 650 MILLIGRAM(S): at 06:04

## 2022-06-06 RX ADMIN — Medication 0.25 MILLIGRAM(S): at 13:52

## 2022-06-06 RX ADMIN — Medication 0.25 MILLIGRAM(S): at 20:54

## 2022-06-06 RX ADMIN — IRON SUCROSE 210 MILLIGRAM(S): 20 INJECTION, SOLUTION INTRAVENOUS at 14:15

## 2022-06-06 RX ADMIN — Medication 650 MILLIGRAM(S): at 11:35

## 2022-06-06 RX ADMIN — Medication 4 MILLIGRAM(S): at 15:04

## 2022-06-06 RX ADMIN — SENNA PLUS 2 TABLET(S): 8.6 TABLET ORAL at 21:06

## 2022-06-06 RX ADMIN — ENOXAPARIN SODIUM 40 MILLIGRAM(S): 100 INJECTION SUBCUTANEOUS at 17:35

## 2022-06-06 RX ADMIN — Medication 650 MILLIGRAM(S): at 05:04

## 2022-06-06 RX ADMIN — Medication 1: at 17:44

## 2022-06-06 RX ADMIN — Medication 1: at 09:02

## 2022-06-06 RX ADMIN — OXYCODONE AND ACETAMINOPHEN 0.5 TABLET(S): 5; 325 TABLET ORAL at 23:14

## 2022-06-06 RX ADMIN — PANTOPRAZOLE SODIUM 40 MILLIGRAM(S): 20 TABLET, DELAYED RELEASE ORAL at 05:05

## 2022-06-06 RX ADMIN — Medication 4 MILLIGRAM(S): at 05:05

## 2022-06-06 RX ADMIN — OXYCODONE HYDROCHLORIDE 2.5 MILLIGRAM(S): 5 TABLET ORAL at 20:17

## 2022-06-06 RX ADMIN — Medication 50 MICROGRAM(S): at 05:05

## 2022-06-06 RX ADMIN — Medication 650 MILLIGRAM(S): at 12:00

## 2022-06-06 RX ADMIN — OXYCODONE HYDROCHLORIDE 2.5 MILLIGRAM(S): 5 TABLET ORAL at 03:19

## 2022-06-06 RX ADMIN — OXYCODONE HYDROCHLORIDE 2.5 MILLIGRAM(S): 5 TABLET ORAL at 02:19

## 2022-06-06 RX ADMIN — CHLORHEXIDINE GLUCONATE 1 APPLICATION(S): 213 SOLUTION TOPICAL at 12:33

## 2022-06-06 RX ADMIN — Medication 500 MILLIGRAM(S): at 10:37

## 2022-06-06 RX ADMIN — Medication 325 MILLIGRAM(S): at 10:37

## 2022-06-06 NOTE — PROGRESS NOTE ADULT - SUBJECTIVE AND OBJECTIVE BOX
Patient seen this morning. No events overnight. She is hoping to get her head staples removed. Complaining of sacral soreness after sitting in the chair for a prolonged period of time.    REVIEW OF SYSTEMS  Constitutional - No fever,  + fatigue  Neurological - No headaches, No memory loss, + loss of strength  Musculoskeletal - + joint pain, No joint swelling, + muscle pain    FUNCTIONAL PROGRESS  6/2 PT  Bed Mobility  Bed Mobility Training Sit-to-Supine: minimum assist (75% patient effort);  1 person assist;  nonverbal cues (demo/gestures);  verbal cues  Bed Mobility Training Supine-to-Sit: minimum assist (75% patient effort);  1 person assist;  verbal cues;  nonverbal cues (demo/gestures)  Bed Mobility Training Limitations: decreased strength;  impaired balance;  decreased flexibility;  impaired coordination    Sit-Stand Transfer Training  Transfer Training Sit-to-Stand Transfer: minimum assist (75% patient effort);  1 person assist;  nonverbal cues (demo/gestures);  verbal cues;  rolling walker  Transfer Training Stand-to-Sit Transfer: minimum assist (75% patient effort);  1 person assist;  nonverbal cues (demo/gestures);  verbal cues;  rolling walker  Sit-to-Stand Transfer Training Transfer Safety Analysis: impaired balance;  impaired coordination;  impaired motor control;  impaired postural control;  decreased strength;  rolling walker    Gait Training  Gait Training: minimum assist (75% patient effort);  1 person assist;  nonverbal cues (demo/gestures);  verbal cues;  rolling walker;  35ft, poor obstacle negotiation and increase in loss of balance during turns  Gait Analysis: 3-point gait   decreased rajiv;  decreased step length;  decreased stride length;  impaired balance;  impaired coordination;  impaired motor control;  impaired postural control;  40ft;  rolling walker    VITALS  T(C): 36.8 (06-06-22 @ 09:44), Max: 37 (06-06-22 @ 02:14)  HR: 72 (06-06-22 @ 09:44) (71 - 104)  BP: 131/447 (06-06-22 @ 09:44) (98/52 - 150/99)  RR: 16 (06-06-22 @ 09:44) (16 - 19)  SpO2: 98% (06-06-22 @ 09:44) (95% - 98%)  Wt(kg): --    MEDICATIONS   acetaminophen     Tablet .. 650 milliGRAM(s) every 6 hours PRN  ascorbic acid 500 milliGRAM(s) daily  chlorhexidine 2% Cloths 1 Application(s) daily  dexAMETHasone     Tablet 4 milliGRAM(s) every 12 hours  dextrose 5%. 1000 milliLiter(s) <Continuous>  dextrose 5%. 1000 milliLiter(s) <Continuous>  dextrose 50% Injectable 25 Gram(s) once  dextrose 50% Injectable 12.5 Gram(s) once  dextrose 50% Injectable 25 Gram(s) once  dextrose Oral Gel 15 Gram(s) once PRN  enoxaparin Injectable 40 milliGRAM(s) <User Schedule>  ferrous    sulfate 325 milliGRAM(s) daily  glucagon  Injectable 1 milliGRAM(s) once  insulin lispro (ADMELOG) corrective regimen sliding scale   three times a day before meals  insulin lispro (ADMELOG) corrective regimen sliding scale   at bedtime  iron sucrose IVPB 100 milliGRAM(s) every 24 hours  levothyroxine 50 MICROGram(s) daily  multivitamin 1 Tablet(s) daily  ondansetron Injectable 4 milliGRAM(s) every 6 hours PRN  oxyCODONE    IR 2.5 milliGRAM(s) every 6 hours PRN  pantoprazole    Tablet 40 milliGRAM(s) before breakfast  polyethylene glycol 3350 17 Gram(s) daily  senna 2 Tablet(s) at bedtime  sodium chloride 0.9% Bolus 250 milliLiter(s) once      RECENT LABS/IMAGING                          8.2    7.17  )-----------( 255      ( 05 Jun 2022 06:16 )             26.7       HEAD CT 5/25 - An approximately 2.7 x 2.2 x 3.2 cm right parietal intraparenchymal hemorrhage is slightly increased in size from 2.4 x 2 x 3.2 cm on 05/22/2022 at 3:13 PM and 2.2 x 1.2 x 2 cm and 05/25/2022 at 8:54 AM. Trace right parietal subarachnoid hemorrhage and trace subdural hemorrhage along the right cerebellar tentorium are stable.    HEAD CT 5/26 - No significant interval change from CT brain 5/25/2022 obtained at 10:19 PM    HEAD CT 5/28 - 1. Stable appearance of previously visualized right posterior parietal/occipital intraparenchymal hematoma. Mild surrounding edema. 2. No evidence of an acute large arterial distribution infarct.    CXR 5/28 - Unremarkable frontal chest x ray    EEG 5/30 - Abnormal EEG study.   - Presence of generalized periodic discharges (GPDs) with triphasic morphology is indicative of severe metabolic encephalopathy, but can be considered epileptiform in the right clinical context. Improving burden in the last quarter of recording.  - functional abnormality in the right parietal region.  - Moderate nonspecific diffuse or multifocal cerebral dysfunction.   - No seizures were recorded.    EEG 5/31 - GPDs with triphasic morphology may be associated with an increased risk for seizure, but are typically seen in the context of a relatively moderate to severe metabolic encephalopathic process.    GPDs less prominent vs prior day.  Right sided cerebral dysfunction, subtle.  No seizures x 2 days. In absence of additional clinical concerns, recommend consideration for discontinuation of current EEG study with reconnection in future if warranted.    MR Head 6/2: Limited by motion and patient's inability to complete exam. Small acute to subacute parenchymal hemorrhage right occipital lobe again seen. No gross abnormal flow related enhancement associated with the parenchymal hemorrhage.    CT Head 6/4: Right parietal intracranial hemorrhage with mild surrounding vasogenic edema and mass affect appears stable since head CT scan of 05/20/2022.      ----------------------------------------------------------------------------------------  PHYSICAL EXAM  Constitutional - NAD, Comfortable  Neck - Supple, +limited ROM due to neck/head pain  Extremities - No C/C/E, No calf tenderness   Neurologic Exam -                    Cognitive - AAO to self, place, PART situation     Communication - Fluent, No dysarthria     Cranial Nerves - Left lower quadrant visual field cut, Left peripheral droop - mild     Motor - No focal deficits       Sensory - Intact to LT  Psychiatric - Mood stable, Calm   ----------------------------------------------------------------------------------------  ASSESSMENT/PLAN  89yFemale with functional deficits after a mechanical fall sustaining an IPH  Right parietal IPH - Stable  HTN - Hydralazine, Labetalol  Hypothyroidism - Synthroid  Pain - Tylenol, Decadron  DVT PPX - SCDs, Lovenox  Rehab - Continue to recommend acute inpatient rehab.    Will continue to follow. Rehab recommendations are dependent on how functional progress changes as well as how patient continues to participate and tolerate therapeutic interventions, which may change. Recommend ongoing mobilization by staff to maintain cardiopulmonary function and prevention of secondary complications related to debility. Discussed with rehab team.

## 2022-06-06 NOTE — PROGRESS NOTE ADULT - ASSESSMENT
Patient is a 89 y/o female with history of DM, HLD, Hypothyroidism, Arthritis, independent at baseline and still drives who was transferred from [facility] [place] for a right IP hemorrhage after sustaining a mechanical fall and hitting the back of her head. Hospital course complicated by an acute episode of altered mentation and aphasia on 5/28 for which VEEG and MRI were ordered. Patient is now AAO x 4 and stable for downgrade to the hospitalist service for further management.    1. Traumatic Right Posterior Parietal/ Occipital IPH with vasogenic edema on CT scan   -repeat CT head on 5/28 with stable bleed  -Vimpat for seizure prophylaxis (switched from Keppra)  -PT evaluation - Acute rehab   PMR accepted.  -Neurosurgery signed off     2- Headache moderate , intractable   intermittent   Ct of brain stable   cont pain meds as needed   added decadron taper d/w NS team   cont oxy and tylenol prn       3- Episode of AMS,  aphasia   -code stroke on 5/28 but not a candidate for tPA given IPH  -VEEG - negative for Seizures  -continue Vimpat for prophylaxis  -seizure precautions  -Neuro and Epilepsy follow up appreciated     4- DM2 controlled  -on sliding scale  ISS  -hold Natiglinide while inpatient    5. Hypothyroidism  -continue synthroid    6. Constipation- resolved  -continue senna/ miralax    7. Anemia likely due to iron deficiency   improved hb   iv venofer x3 days   then po iron   -iron studies reviewed     8. Mild Hyponatremia  improved     9- Moderate protein hakeem malnutrition   cont ensure and oral diet     10. Arthritis  -continue Tylenol as needed       -PT evaluation; OOB to chair    Lovenox     Dispo- to acute rehab   son Kirk. he prefers [facility] because of location.     remove staples prior discharge

## 2022-06-06 NOTE — PROGRESS NOTE ADULT - PROVIDER SPECIALTY LIST ADULT
Brain Injury Medicine
Internal Medicine
Neurosurgery
Brain Injury Medicine
Brain Injury Medicine
Internal Medicine
Internal Medicine
NSICU
Neurosurgery
Brain Injury Medicine
Brain Injury Medicine
Hospitalist
Hospitalist
NSICU
Neurology
Neurosurgery
Neurosurgery
Hospitalist
Internal Medicine
Neurology
NSICU
Neurology
Neurosurgery

## 2022-06-06 NOTE — PROGRESS NOTE ADULT - REASON FOR ADMISSION
transfer from [facility], s/p fall with IPH

## 2022-06-06 NOTE — PROGRESS NOTE ADULT - ATTENDING COMMENTS
Demonstrating functional progress.   Rehab - Recommend ACUTE inpatient rehabilitation for the functional deficits consisting of 3 hours of therapy/day & 24 hour RN/daily PMR physician for comorbid medical management. Will continue to follow for ongoing rehab needs and recommendations. Patient will be able to tolerate 3 hours a day.    Continue bedside therapy as well as OOB throughout the day with mobilization throughout the day with staff to maintain cardiopulmonary function and prevention of secondary complications related to debility.     Discussed with rehab clinical team.        Patient seen and examined, discussed patient with Dr. Aden and agree with recommendations.

## 2022-06-06 NOTE — PROGRESS NOTE ADULT - NUTRITIONAL ASSESSMENT
This patient has been assessed with a concern for Malnutrition and has been determined to have a diagnosis/diagnoses of Moderate protein-calorie malnutrition.    This patient is being managed with:   Diet Consistent Carbohydrate w/Evening Snack-  Supplement Feeding Modality:  Oral  Ensure Enlive Cans or Servings Per Day:  1       Frequency:  Three Times a day  Entered: May 26 2022 11:48AM    
This patient has been assessed with a concern for Malnutrition and has been determined to have a diagnosis/diagnoses of Moderate protein-calorie malnutrition.    This patient is being managed with:   Diet Consistent Carbohydrate w/Evening Snack-  Supplement Feeding Modality:  Oral  Ensure Enlive Cans or Servings Per Day:  1       Frequency:  Three Times a day  Entered: May 26 2022 11:48AM      This patient has been assessed with a concern for Malnutrition and has been determined to have a diagnosis/diagnoses of Moderate protein-calorie malnutrition.    This patient is being managed with:   Diet Consistent Carbohydrate w/Evening Snack-  Supplement Feeding Modality:  Oral  Ensure Enlive Cans or Servings Per Day:  1       Frequency:  Three Times a day  Entered: May 26 2022 11:48AM    
This patient has been assessed with a concern for Malnutrition and has been determined to have a diagnosis/diagnoses of Moderate protein-calorie malnutrition.    This patient is being managed with:   Diet Consistent Carbohydrate w/Evening Snack-  Supplement Feeding Modality:  Oral  Ensure Enlive Cans or Servings Per Day:  1       Frequency:  Three Times a day  Entered: May 26 2022 11:48AM    
This patient has been assessed with a concern for Malnutrition and has been determined to have a diagnosis/diagnoses of Moderate protein-calorie malnutrition.    This patient is being managed with:   Diet Consistent Carbohydrate w/Evening Snack-  Supplement Feeding Modality:  Oral  Ensure Enlive Cans or Servings Per Day:  1       Frequency:  Three Times a day  Entered: May 26 2022 11:48AM      This patient has been assessed with a concern for Malnutrition and has been determined to have a diagnosis/diagnoses of Moderate protein-calorie malnutrition.    This patient is being managed with:   Diet Consistent Carbohydrate w/Evening Snack-  Supplement Feeding Modality:  Oral  Ensure Enlive Cans or Servings Per Day:  1       Frequency:  Three Times a day  Entered: May 26 2022 11:48AM    
This patient has been assessed with a concern for Malnutrition and has been determined to have a diagnosis/diagnoses of Moderate protein-calorie malnutrition.    This patient is being managed with:   Diet Consistent Carbohydrate w/Evening Snack-  Supplement Feeding Modality:  Oral  Ensure Enlive Cans or Servings Per Day:  1       Frequency:  Three Times a day  Entered: May 26 2022 11:48AM    
This patient has been assessed with a concern for Malnutrition and has been determined to have a diagnosis/diagnoses of Moderate protein-calorie malnutrition.    This patient is being managed with:   Diet Consistent Carbohydrate w/Evening Snack-  Supplement Feeding Modality:  Oral  Ensure Enlive Cans or Servings Per Day:  1       Frequency:  Three Times a day  Entered: May 26 2022 11:48AM      This patient has been assessed with a concern for Malnutrition and has been determined to have a diagnosis/diagnoses of Moderate protein-calorie malnutrition.    This patient is being managed with:   Diet Consistent Carbohydrate w/Evening Snack-  Supplement Feeding Modality:  Oral  Ensure Enlive Cans or Servings Per Day:  1       Frequency:  Three Times a day  Entered: May 26 2022 11:48AM

## 2022-06-06 NOTE — PROGRESS NOTE ADULT - SUBJECTIVE AND OBJECTIVE BOX
Patient is with c/o headache on off last 2 days     pain better this am then yesterday   sitting in the chair  , no distress looks comfortable       ALLERGIES:  Celebrex (Unknown)  codeine (Unknown)  iodine (Unknown)  meds reviewed     Vital Signs Last 24 Hrs  T(C): 36.8 (06 Jun 2022 09:44), Max: 37 (06 Jun 2022 02:14)  T(F): 98.3 (06 Jun 2022 09:44), Max: 98.6 (06 Jun 2022 02:14)  HR: 72 (06 Jun 2022 09:44) (71 - 104)  BP: 131/447 (06 Jun 2022 09:44) (98/52 - 150/99)  BP(mean): --  RR: 16 (06 Jun 2022 09:44) (16 - 19)  SpO2: 98% (06 Jun 2022 09:44) (95% - 98%)          PHYSICAL EXAM:  General: awake alert , no distress   Neck: supple, no JVD   Lungs: CTA bilateral   Cardio: RRR, S1/S2, No murmur  Abdomen: Soft, Nontender, Nondistended; Bowel sounds present  Extremities: No calf tenderness, No pitting edema                          8.2    7.17  )-----------( 255      ( 05 Jun 2022 06:16 )             26.7             05-26 Chol 109 mg/dL LDL -- HDL 49 mg/dL Trig 86 mg/dL              POCT Blood Glucose.: 196 mg/dL (04 Jun 2022 21:07)  POCT Blood Glucose.: 146 mg/dL (04 Jun 2022 17:27)          COVID-19 PCR: NotDetec (06-01-22 @ 07:44)  COVID-19 PCR: NotDetec (05-25-22 @ 12:29)    RADIOLOGY & ADDITIONAL TESTS:

## 2022-06-07 ENCOUNTER — TRANSCRIPTION ENCOUNTER (OUTPATIENT)
Age: 87
End: 2022-06-07

## 2022-06-07 VITALS
OXYGEN SATURATION: 96 % | DIASTOLIC BLOOD PRESSURE: 62 MMHG | SYSTOLIC BLOOD PRESSURE: 120 MMHG | TEMPERATURE: 98 F | RESPIRATION RATE: 17 BRPM | HEART RATE: 81 BPM

## 2022-06-07 LAB
ANION GAP SERPL CALC-SCNC: 14 MMOL/L — SIGNIFICANT CHANGE UP (ref 5–17)
BUN SERPL-MCNC: 21.3 MG/DL — HIGH (ref 8–20)
CALCIUM SERPL-MCNC: 8.6 MG/DL — SIGNIFICANT CHANGE UP (ref 8.6–10.2)
CHLORIDE SERPL-SCNC: 104 MMOL/L — SIGNIFICANT CHANGE UP (ref 98–107)
CO2 SERPL-SCNC: 22 MMOL/L — SIGNIFICANT CHANGE UP (ref 22–29)
CREAT SERPL-MCNC: 0.49 MG/DL — LOW (ref 0.5–1.3)
EGFR: 86 ML/MIN/1.73M2 — SIGNIFICANT CHANGE UP
GLUCOSE BLDC GLUCOMTR-MCNC: 191 MG/DL — HIGH (ref 70–99)
GLUCOSE BLDC GLUCOMTR-MCNC: 99 MG/DL — SIGNIFICANT CHANGE UP (ref 70–99)
GLUCOSE SERPL-MCNC: 103 MG/DL — HIGH (ref 70–99)
HCT VFR BLD CALC: 29 % — LOW (ref 34.5–45)
HGB BLD-MCNC: 8.9 G/DL — LOW (ref 11.5–15.5)
MCHC RBC-ENTMCNC: 29.6 PG — SIGNIFICANT CHANGE UP (ref 27–34)
MCHC RBC-ENTMCNC: 30.7 GM/DL — LOW (ref 32–36)
MCV RBC AUTO: 96.3 FL — SIGNIFICANT CHANGE UP (ref 80–100)
PLATELET # BLD AUTO: 306 K/UL — SIGNIFICANT CHANGE UP (ref 150–400)
POTASSIUM SERPL-MCNC: 4.2 MMOL/L — SIGNIFICANT CHANGE UP (ref 3.5–5.3)
POTASSIUM SERPL-SCNC: 4.2 MMOL/L — SIGNIFICANT CHANGE UP (ref 3.5–5.3)
RBC # BLD: 3.01 M/UL — LOW (ref 3.8–5.2)
RBC # FLD: 16.8 % — HIGH (ref 10.3–14.5)
SARS-COV-2 RNA SPEC QL NAA+PROBE: SIGNIFICANT CHANGE UP
SODIUM SERPL-SCNC: 140 MMOL/L — SIGNIFICANT CHANGE UP (ref 135–145)
WBC # BLD: 7.97 K/UL — SIGNIFICANT CHANGE UP (ref 3.8–10.5)
WBC # FLD AUTO: 7.97 K/UL — SIGNIFICANT CHANGE UP (ref 3.8–10.5)

## 2022-06-07 PROCEDURE — 83605 ASSAY OF LACTIC ACID: CPT

## 2022-06-07 PROCEDURE — 84134 ASSAY OF PREALBUMIN: CPT

## 2022-06-07 PROCEDURE — 86880 COOMBS TEST DIRECT: CPT

## 2022-06-07 PROCEDURE — 83935 ASSAY OF URINE OSMOLALITY: CPT

## 2022-06-07 PROCEDURE — 82570 ASSAY OF URINE CREATININE: CPT

## 2022-06-07 PROCEDURE — 83036 HEMOGLOBIN GLYCOSYLATED A1C: CPT

## 2022-06-07 PROCEDURE — 71045 X-RAY EXAM CHEST 1 VIEW: CPT

## 2022-06-07 PROCEDURE — 95700 EEG CONT REC W/VID EEG TECH: CPT

## 2022-06-07 PROCEDURE — 85025 COMPLETE CBC W/AUTO DIFF WBC: CPT

## 2022-06-07 PROCEDURE — 72170 X-RAY EXAM OF PELVIS: CPT

## 2022-06-07 PROCEDURE — 80061 LIPID PANEL: CPT

## 2022-06-07 PROCEDURE — G1004: CPT

## 2022-06-07 PROCEDURE — 86905 BLOOD TYPING RBC ANTIGENS: CPT

## 2022-06-07 PROCEDURE — 80053 COMPREHEN METABOLIC PANEL: CPT

## 2022-06-07 PROCEDURE — U0003: CPT

## 2022-06-07 PROCEDURE — U0005: CPT

## 2022-06-07 PROCEDURE — 85027 COMPLETE CBC AUTOMATED: CPT

## 2022-06-07 PROCEDURE — 82746 ASSAY OF FOLIC ACID SERUM: CPT

## 2022-06-07 PROCEDURE — 83550 IRON BINDING TEST: CPT

## 2022-06-07 PROCEDURE — 84466 ASSAY OF TRANSFERRIN: CPT

## 2022-06-07 PROCEDURE — 86850 RBC ANTIBODY SCREEN: CPT

## 2022-06-07 PROCEDURE — 87640 STAPH A DNA AMP PROBE: CPT

## 2022-06-07 PROCEDURE — 84145 PROCALCITONIN (PCT): CPT

## 2022-06-07 PROCEDURE — 97116 GAIT TRAINING THERAPY: CPT

## 2022-06-07 PROCEDURE — 95714 VEEG EA 12-26 HR UNMNTR: CPT

## 2022-06-07 PROCEDURE — 97167 OT EVAL HIGH COMPLEX 60 MIN: CPT

## 2022-06-07 PROCEDURE — 70551 MRI BRAIN STEM W/O DYE: CPT

## 2022-06-07 PROCEDURE — 87641 MR-STAPH DNA AMP PROBE: CPT

## 2022-06-07 PROCEDURE — 82728 ASSAY OF FERRITIN: CPT

## 2022-06-07 PROCEDURE — 86901 BLOOD TYPING SEROLOGIC RH(D): CPT

## 2022-06-07 PROCEDURE — 83540 ASSAY OF IRON: CPT

## 2022-06-07 PROCEDURE — 80048 BASIC METABOLIC PNL TOTAL CA: CPT

## 2022-06-07 PROCEDURE — 93005 ELECTROCARDIOGRAM TRACING: CPT

## 2022-06-07 PROCEDURE — 84100 ASSAY OF PHOSPHORUS: CPT

## 2022-06-07 PROCEDURE — 70450 CT HEAD/BRAIN W/O DYE: CPT

## 2022-06-07 PROCEDURE — 99239 HOSP IP/OBS DSCHRG MGMT >30: CPT

## 2022-06-07 PROCEDURE — 82607 VITAMIN B-12: CPT

## 2022-06-07 PROCEDURE — 85730 THROMBOPLASTIN TIME PARTIAL: CPT

## 2022-06-07 PROCEDURE — 73620 X-RAY EXAM OF FOOT: CPT

## 2022-06-07 PROCEDURE — 86922 COMPATIBILITY TEST ANTIGLOB: CPT

## 2022-06-07 PROCEDURE — 80307 DRUG TEST PRSMV CHEM ANLYZR: CPT

## 2022-06-07 PROCEDURE — 84300 ASSAY OF URINE SODIUM: CPT

## 2022-06-07 PROCEDURE — 86870 RBC ANTIBODY IDENTIFICATION: CPT

## 2022-06-07 PROCEDURE — 36415 COLL VENOUS BLD VENIPUNCTURE: CPT

## 2022-06-07 PROCEDURE — 85610 PROTHROMBIN TIME: CPT

## 2022-06-07 PROCEDURE — 95705 EEG W/O VID 2-12 HR UNMNTR: CPT

## 2022-06-07 PROCEDURE — 97110 THERAPEUTIC EXERCISES: CPT

## 2022-06-07 PROCEDURE — 86900 BLOOD TYPING SEROLOGIC ABO: CPT

## 2022-06-07 PROCEDURE — 81001 URINALYSIS AUTO W/SCOPE: CPT

## 2022-06-07 PROCEDURE — 70544 MR ANGIOGRAPHY HEAD W/O DYE: CPT

## 2022-06-07 PROCEDURE — C8929: CPT

## 2022-06-07 PROCEDURE — 82962 GLUCOSE BLOOD TEST: CPT

## 2022-06-07 PROCEDURE — 83735 ASSAY OF MAGNESIUM: CPT

## 2022-06-07 PROCEDURE — 84443 ASSAY THYROID STIM HORMONE: CPT

## 2022-06-07 PROCEDURE — 86860 RBC ANTIBODY ELUTION: CPT

## 2022-06-07 PROCEDURE — C9254: CPT

## 2022-06-07 RX ORDER — ALPRAZOLAM 0.25 MG
1 TABLET ORAL
Qty: 0 | Refills: 0 | DISCHARGE
Start: 2022-06-07

## 2022-06-07 RX ORDER — LEVOTHYROXINE SODIUM 125 MCG
1 TABLET ORAL
Qty: 0 | Refills: 0 | DISCHARGE

## 2022-06-07 RX ORDER — ASPIRIN/CALCIUM CARB/MAGNESIUM 324 MG
1 TABLET ORAL
Qty: 0 | Refills: 0 | DISCHARGE

## 2022-06-07 RX ORDER — PANTOPRAZOLE SODIUM 20 MG/1
1 TABLET, DELAYED RELEASE ORAL
Qty: 0 | Refills: 0 | DISCHARGE
Start: 2022-06-07

## 2022-06-07 RX ORDER — SENNA PLUS 8.6 MG/1
2 TABLET ORAL
Qty: 0 | Refills: 0 | DISCHARGE
Start: 2022-06-07

## 2022-06-07 RX ORDER — FERROUS SULFATE 325(65) MG
1 TABLET ORAL
Qty: 0 | Refills: 0 | DISCHARGE
Start: 2022-06-07

## 2022-06-07 RX ORDER — POLYETHYLENE GLYCOL 3350 17 G/17G
17 POWDER, FOR SOLUTION ORAL
Qty: 0 | Refills: 0 | DISCHARGE
Start: 2022-06-07

## 2022-06-07 RX ORDER — LEVOTHYROXINE SODIUM 125 MCG
1 TABLET ORAL
Qty: 0 | Refills: 0 | DISCHARGE
Start: 2022-06-07

## 2022-06-07 RX ORDER — ASCORBIC ACID 60 MG
1 TABLET,CHEWABLE ORAL
Qty: 0 | Refills: 0 | DISCHARGE
Start: 2022-06-07

## 2022-06-07 RX ADMIN — OXYCODONE HYDROCHLORIDE 2.5 MILLIGRAM(S): 5 TABLET ORAL at 08:41

## 2022-06-07 RX ADMIN — PANTOPRAZOLE SODIUM 40 MILLIGRAM(S): 20 TABLET, DELAYED RELEASE ORAL at 05:03

## 2022-06-07 RX ADMIN — Medication 0.25 MILLIGRAM(S): at 05:00

## 2022-06-07 RX ADMIN — Medication 500 MILLIGRAM(S): at 08:38

## 2022-06-07 RX ADMIN — OXYCODONE HYDROCHLORIDE 2.5 MILLIGRAM(S): 5 TABLET ORAL at 03:45

## 2022-06-07 RX ADMIN — Medication 50 MICROGRAM(S): at 05:02

## 2022-06-07 RX ADMIN — Medication 325 MILLIGRAM(S): at 08:38

## 2022-06-07 RX ADMIN — Medication 1: at 12:31

## 2022-06-07 RX ADMIN — OXYCODONE HYDROCHLORIDE 2.5 MILLIGRAM(S): 5 TABLET ORAL at 02:49

## 2022-06-07 RX ADMIN — Medication 0.25 MILLIGRAM(S): at 14:29

## 2022-06-07 RX ADMIN — POLYETHYLENE GLYCOL 3350 17 GRAM(S): 17 POWDER, FOR SOLUTION ORAL at 08:38

## 2022-06-07 RX ADMIN — OXYCODONE AND ACETAMINOPHEN 0.5 TABLET(S): 5; 325 TABLET ORAL at 00:07

## 2022-06-07 RX ADMIN — Medication 1 TABLET(S): at 08:38

## 2022-06-07 NOTE — DISCHARGE NOTE PROVIDER - NSDCCPCAREPLAN_GEN_ALL_CORE_FT
PRINCIPAL DISCHARGE DIAGNOSIS  Diagnosis: ICH (intracerebral hemorrhage)  Assessment and Plan of Treatment: Acute rehab on discharge      SECONDARY DISCHARGE DIAGNOSES  Diagnosis: DM (diabetes mellitus)  Assessment and Plan of Treatment: Continue home meds    Diagnosis: Hypothyroidism  Assessment and Plan of Treatment: Continue home meds    Diagnosis: Anemia  Assessment and Plan of Treatment: Continue oral iron supplmentation     PRINCIPAL DISCHARGE DIAGNOSIS  Diagnosis: ICH (intracerebral hemorrhage)  Assessment and Plan of Treatment: Acute rehab on discharge  no aspirin until followed with neurosurgery team , cleared      SECONDARY DISCHARGE DIAGNOSES  Diagnosis: DM (diabetes mellitus)  Assessment and Plan of Treatment: Continue home meds    Diagnosis: Hypothyroidism  Assessment and Plan of Treatment: Continue home meds    Diagnosis: Anemia  Assessment and Plan of Treatment: Continue oral iron supplmentation    Diagnosis: Other headache syndrome  Assessment and Plan of Treatment: tylenol, or percocet as needed

## 2022-06-07 NOTE — DISCHARGE NOTE PROVIDER - HOSPITAL COURSE
Patient is a 89 y/o female with history of DM, HLD, Hypothyroidism, Arthritis, independent at baseline and still drives who was transferred from [facility] for a right IP hemorrhage after sustaining a mechanical fall and hitting the back of her head. Patient was seen in consult by neurosurgical team upon admission, observed in NCCU, repeat CT head with stable bleed and was started on vimpat for seizure prophylaxis. Hospital course complicated by an acute episode of altered mentation and aphasia on 5/28 for which VEEG and MRI were ordered and negative for acute new findings. Patient is now AAO x 4 and stable. Patients complaints of intermittent headaches were managed with oxy/tylenol and decadron taper per Nsx. During hospital stay, noted with anemia, treated with 3 days of IV venofer and hb improved. Seen in consult by PT and PM&R, accepted to acute rehab.

## 2022-06-07 NOTE — DISCHARGE NOTE PROVIDER - ATTENDING DISCHARGE PHYSICAL EXAMINATION:
pt is feeling better today , no HA   c/o joint pain   no overnight events     Vital Signs Last 24 Hrs  T(C): 36.7 (07 Jun 2022 08:00), Max: 36.7 (06 Jun 2022 15:00)  T(F): 98 (07 Jun 2022 08:00), Max: 98 (06 Jun 2022 15:00)  HR: 78 (07 Jun 2022 08:00) (74 - 91)  BP: 110/62 (07 Jun 2022 08:00) (110/62 - 134/60)  BP(mean): --  RR: 17 (07 Jun 2022 08:00) (15 - 18)  SpO2: 99% (07 Jun 2022 08:00) (93% - 99%)    Lungs : CTA bilateral   Heart : regular s1 /s2   Abd soft no tenderness , BS positive   Ext : no pretibial edema   skin : pale

## 2022-06-07 NOTE — DISCHARGE NOTE PROVIDER - CARE PROVIDER_API CALL
Gene Ang)  Neurosurgery  [address], [facility] [address]  [place] [address]  Phone: [phone]  Fax: [phone]  Follow Up Time: 1 week

## 2022-06-07 NOTE — DISCHARGE NOTE PROVIDER - NSDCQMSTROKERISK_NEU_ALL_CORE
Diabetes/High cholesterol/History of a stroke or TIA Diabetes/High blood pressure/High cholesterol/History of a stroke or TIA

## 2022-06-07 NOTE — DISCHARGE NOTE PROVIDER - NSDCMRMEDTOKEN_GEN_ALL_CORE_FT
ALPRAZolam 0.25 mg oral tablet: 1 tab(s) orally 2 times a day, As needed, anxiety  ascorbic acid 500 mg oral tablet: 1 tab(s) orally once a day  Aspir 81 oral delayed release tablet: 1 tab(s) orally once a day  atorvastatin 10 mg oral tablet: 1 tab(s) orally once a day  ferrous sulfate 325 mg (65 mg elemental iron) oral tablet: 1 tab(s) orally once a day  levothyroxine 50 mcg (0.05 mg) oral tablet: 1 tab(s) orally once a day  Multiple Vitamins oral tablet: 1 tab(s) orally once a day  nateglinide 60 mg oral tablet: 1 tab(s) orally 2 times a day  pantoprazole 40 mg oral delayed release tablet: 1 tab(s) orally once a day (before a meal)  polyethylene glycol 3350 oral powder for reconstitution: 17 gram(s) orally once a day  senna oral tablet: 2 tab(s) orally once a day (at bedtime)   ALPRAZolam 0.25 mg oral tablet: 1 tab(s) orally 2 times a day, As needed, anxiety  ascorbic acid 500 mg oral tablet: 1 tab(s) orally once a day  atorvastatin 10 mg oral tablet: 1 tab(s) orally once a day  ferrous sulfate 325 mg (65 mg elemental iron) oral tablet: 1 tab(s) orally once a day  levothyroxine 50 mcg (0.05 mg) oral tablet: 1 tab(s) orally once a day  Multiple Vitamins oral tablet: 1 tab(s) orally once a day  nateglinide 60 mg oral tablet: 1 tab(s) orally 2 times a day  pantoprazole 40 mg oral delayed release tablet: 1 tab(s) orally once a day (before a meal)  polyethylene glycol 3350 oral powder for reconstitution: 17 gram(s) orally once a day  senna oral tablet: 2 tab(s) orally once a day (at bedtime)

## 2022-06-07 NOTE — DISCHARGE NOTE PROVIDER - DETAILS OF MALNUTRITION DIAGNOSIS/DIAGNOSES
This patient has been assessed with a concern for Malnutrition and was treated during this hospitalization for the following Nutrition diagnosis/diagnoses:     -  05/27/2022: Moderate protein-calorie malnutrition

## 2022-06-13 NOTE — CHART NOTE - NSCHARTNOTEFT_GEN_A_CORE
Antibody Interpretation: Warm Autoantibody (WAA)    Patient is a 89 y/o female transfer from [facility] s/p trip and fall. Imaging studies at [place] revealed right intraparenchymal hemorrhage prompting transfer to [facility] for neurosurgical evaluation & management.  Blood sample received on 05/25/22 has a positive antibody screen and autocontrol, and the patient's plasma reacts with all cells tested. The direct antiglobulin test is positive with anti-IgG AHG, and the eluate reacts with all cells tested. 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 Warm autoantibodies are generally not clinically significant, but occasionally may be associated with WAIHA. These antibodies can also interfere with blood typing and compatibility testing, causing delays in and/or prevent finding compatible units for transfusion. Please allow sufficient time for pre-transfusion blood bank workup.

## 2022-09-15 PROBLEM — E78.5 HYPERLIPIDEMIA, UNSPECIFIED: Chronic | Status: ACTIVE | Noted: 2022-05-25

## 2022-09-15 PROBLEM — E11.9 TYPE 2 DIABETES MELLITUS WITHOUT COMPLICATIONS: Chronic | Status: ACTIVE | Noted: 2022-05-25

## 2022-09-15 PROBLEM — E03.9 HYPOTHYROIDISM, UNSPECIFIED: Chronic | Status: ACTIVE | Noted: 2022-05-25

## 2022-10-27 ENCOUNTER — NON-APPOINTMENT (OUTPATIENT)
Age: 87
End: 2022-10-27

## 2022-10-27 DIAGNOSIS — Z86.2 PERSONAL HISTORY OF DISEASES OF THE BLOOD AND BLOOD-FORMING ORGANS AND CERTAIN DISORDERS INVOLVING THE IMMUNE MECHANISM: ICD-10-CM

## 2022-10-27 DIAGNOSIS — K21.9 GASTRO-ESOPHAGEAL REFLUX DISEASE W/OUT ESOPHAGITIS: ICD-10-CM

## 2022-11-03 ENCOUNTER — NON-APPOINTMENT (OUTPATIENT)
Age: 87
End: 2022-11-03

## 2022-11-03 ENCOUNTER — APPOINTMENT (OUTPATIENT)
Dept: OPHTHALMOLOGY | Facility: CLINIC | Age: 87
End: 2022-11-03

## 2022-11-03 PROCEDURE — 92134 CPTRZ OPH DX IMG PST SGM RTA: CPT

## 2022-11-03 PROCEDURE — 92014 COMPRE OPH EXAM EST PT 1/>: CPT

## 2022-12-02 ENCOUNTER — APPOINTMENT (OUTPATIENT)
Dept: ENDOCRINOLOGY | Facility: CLINIC | Age: 87
End: 2022-12-02

## 2022-12-05 ENCOUNTER — APPOINTMENT (OUTPATIENT)
Dept: ENDOCRINOLOGY | Facility: CLINIC | Age: 87
End: 2022-12-05

## 2022-12-05 PROCEDURE — 99213 OFFICE O/P EST LOW 20 MIN: CPT

## 2022-12-05 NOTE — ASSESSMENT
[FreeTextEntry1] : Elderly white female with a past medical history of her type 2 diabetes well controlled on nateglinide 60 mg tablets twice a day.  Her last blood test from 11/25/2022 shows hemoglobin A1c of 5.1% and a normal basic metabolic panel.  Patient is well compliant with her diet and is minimally active.  Denies any symptoms of hypoglycemia.  Also the patient is clinically euthyroid and continues on levothyroxine 50 mcg tablet daily and also she is taking vitamin D3 plus calcium supplements for her osteopenia.  Patient is clinically stable.  Recommendation\par 1.  Patient is to continue all her current medications.\par 2.  The importance of diet and exercise was discussed with the patient.  Also hypoglycemic symptoms and its management were also explained to the patient.\par 3.  Fall precautions were also explained to the patient.\par Patient will return to the office in approximately 4 months time and will have a repeat blood test prior to her visit.  Also she requires a repeat sonogram of the thyroid and a bone density in June 2023.  The plan was discussed in detail with the patient and her son [Diabetes Foot Care] : diabetes foot care [Long Term Vascular Complications] : long term vascular complications of diabetes [Carbohydrate Consistent Diet] : carbohydrate consistent diet [Importance of Diet and Exercise] : importance of diet and exercise to improve glycemic control, achieve weight loss and improve cardiovascular health [Exercise/Effect on Glucose] : exercise/effect on glucose [Hypoglycemia Management] : hypoglycemia management [Self Monitoring of Blood Glucose] : self monitoring of blood glucose [Retinopathy Screening] : Patient was referred to ophthalmology for retinopathy screening

## 2022-12-05 NOTE — HISTORY OF PRESENT ILLNESS
[FreeTextEntry1] : 89-year-old white male with a past medical history of type 2 diabetes mild multinodular goiter last sonogram was in June 2021 and osteopenia.  Patient i presents for follow-up evaluation..  Patient is currently taking nateglinide 60 mg tablets twice a day before meals and levothyroxine 50 mcg tablets daily plus vitamin D3 and Lipitor.  Patient denies any significant symptoms.  Her finger sticks at home have been stable averaging 120 to 130 mg per DL prior to meals.  Physically she is active and is still able to take care of herself at home.  She lives alone.  There is no history of any recent falls or trauma.  She she denies any polyuria polydipsia or nocturia.  She has not noticed any change in her eyesight or numbness of extremities or any hypoglycemic episodes.

## 2022-12-05 NOTE — PHYSICAL EXAM
[No Acute Distress] : no acute distress [Normal Sclera/Conjunctiva] : normal sclera/conjunctiva [Normal Outer Ear/Nose] : the ears and nose were normal in appearance [Thyroid Not Enlarged] : the thyroid was not enlarged [Clear to Auscultation] : lungs were clear to auscultation bilaterally [Normal S1, S2] : normal S1 and S2 [Normal Rate] : heart rate was normal [Regular Rhythm] : with a regular rhythm [Carotids Normal] : carotid pulses were normal with no bruits [No Edema] : no peripheral edema [Pedal Pulses Normal] : the pedal pulses are present [No Varicosities] : there were no varicosital changes [Not Tender] : non-tender [No Masses] : no abdominal mass palpated [Soft] : abdomen soft [Normal Anterior Cervical Nodes] : no anterior cervical lymphadenopathy [No CVA Tenderness] : no ~M costovertebral angle tenderness [No Stigmata of Cushings Syndrome] : no stigmata of Cushings Syndrome [Normal Gait] : normal gait [No Joint Swelling] : no joint swelling seen [Normal Strength/Tone] : muscle strength and tone were normal [No Motor Deficits] : the motor exam was normal [No Sensory Deficits] : the sensory exam was normal to light touch and pinprick [Normal Reflexes] : deep tendon reflexes were 2+ and symmetric [No Tremors] : no tremors [Oriented x3] : oriented to person, place, and time [de-identified] : Small bilateral palpable nodules no lymph nodes [de-identified] : Deferred [FreeTextEntry1] : Deferred [de-identified] : Deferred

## 2023-04-18 ENCOUNTER — APPOINTMENT (OUTPATIENT)
Dept: ENDOCRINOLOGY | Facility: CLINIC | Age: 88
End: 2023-04-18
Payer: MEDICARE

## 2023-04-18 VITALS
OXYGEN SATURATION: 99 % | TEMPERATURE: 98 F | RESPIRATION RATE: 14 BRPM | HEIGHT: 58 IN | HEART RATE: 71 BPM | SYSTOLIC BLOOD PRESSURE: 128 MMHG | DIASTOLIC BLOOD PRESSURE: 64 MMHG

## 2023-04-18 PROCEDURE — 99213 OFFICE O/P EST LOW 20 MIN: CPT

## 2023-04-18 NOTE — REVIEW OF SYSTEMS
[Headaches] : headaches [Confusion] : confusion [Difficulty Walking] : difficulty walking [Poor Balance] : poor balance [As Noted in HPI] : as noted in HPI [Negative] : Heme/Lymph

## 2023-04-18 NOTE — PHYSICAL EXAM
[No Acute Distress] : no acute distress [Normal Sclera/Conjunctiva] : normal sclera/conjunctiva [EOMI] : extra ocular movement intact [PERRL] : pupils equal, round and reactive to light [Normal Outer Ear/Nose] : the ears and nose were normal in appearance [Normal Hearing] : hearing was normal [No Neck Mass] : no neck mass was observed [Thyroid Not Enlarged] : the thyroid was not enlarged [No Respiratory Distress] : no respiratory distress [Normal S1, S2] : normal S1 and S2 [No Murmurs] : no murmurs [Normal Rate] : heart rate was normal [Regular Rhythm] : with a regular rhythm [Carotids Normal] : carotid pulses were normal with no bruits [No Edema] : no peripheral edema [Pedal Pulses Normal] : the pedal pulses are present [Not Tender] : non-tender [Not Distended] : not distended [Soft] : abdomen soft [No HSM] : no hepato-splenomegaly [No Spinal Tenderness] : no spinal tenderness [Kyphosis] : kyphosis present [Normal Gait] : normal gait [No Clubbing, Cyanosis] : no clubbing  or cyanosis of the fingernails [No Joint Swelling] : no joint swelling seen [No Rash] : no rash [de-identified] : Mild palpable bilateral thyroid nodules smooth and nontender no lymphadenopathy  [de-identified] : Deferred [FreeTextEntry1] : Deferred [de-identified] : Deferred [de-identified] : Denies hypoglycemia or any recent fractures or falls

## 2023-04-18 NOTE — HISTORY OF PRESENT ILLNESS
[FreeTextEntry1] : Is a 89-year-old female with a past medical history of a type 2 diabetes currently maintained on nateglinide 60 mg tablets twice a day.  Patient is accompanied by her grandson.  According to the patient her sugar levels have been ranging between 120 to 130 minutes grams per DL.  She is well compliant with the diet and usually eats 3 meals a day.  She recently has been complaining of dizzy episodes mostly occurring at night or in the afternoon.  She does not accompanied by nausea vomiting or spinning of the head or ringing of the ears.  Her vision has been stable.  She denies any numbness of extremities.  Review of systems otherwise negative for chest pain shortness of breath blurry vision nausea vomiting abdominal pain.  Patient also has a history of osteopenia, multinodular goiter and gastroesophageal reflux disease.  She is currently also taking levothyroxine 50 mcg tablet daily vitamin D3 supplements, Lipitor 10 mg daily, multivitamin tablets and calcium supplements.

## 2023-04-18 NOTE — ASSESSMENT
[Diabetes Foot Care] : diabetes foot care [Long Term Vascular Complications] : long term vascular complications of diabetes [Carbohydrate Consistent Diet] : carbohydrate consistent diet [Importance of Diet and Exercise] : importance of diet and exercise to improve glycemic control, achieve weight loss and improve cardiovascular health [Exercise/Effect on Glucose] : exercise/effect on glucose [Hypoglycemia Management] : hypoglycemia management [Self Monitoring of Blood Glucose] : self monitoring of blood glucose [Retinopathy Screening] : Patient was referred to ophthalmology for retinopathy screening [FreeTextEntry1] : Elderly white female who has a past medical history of a primary hypothyroidism multinodular goiter and type 2 diabetes.  She also has a history of osteopenia dillon.  Patient currently is on nateglinide 60 mg tablets twice a day levothyroxine 50 mcg daily and also white vitamin D and calcium supplements.  She recently had a blood test on 3/30/2023 her complete metabolic panel was normal except for a BUN of 27 creatinine 1.59 alkaline phosphatase is elevated at 222 ALT is of 51.  Hemoglobin A1c is TSH is 0.94.  Above findings were son.  Recommendation\par I have advised the patient to continue the nateglinide 1 tablet 60 mg twice a day and the levothyroxine 50 mcg tablets every day.\par 2.  I have also advised the patient to seek a neurological evaluation for constant headaches with with occasional dizzy episodes.  I gave her the number of [facility].\par 3.  Fall precautions were discussed with the patient\par 4.  Her eye evaluation is still pending\par 5.  We will repeat the patient's bone density and also a sonogram of the thyroid in June.  Patient will also have a repeat blood test drawn prior to her next office visit.  The plan was discussed with the patient thank you

## 2023-05-03 RX ORDER — LEVOTHYROXINE SODIUM 0.05 MG/1
50 TABLET ORAL
Qty: 90 | Refills: 3 | Status: ACTIVE | COMMUNITY
Start: 1900-01-01 | End: 1900-01-01

## 2023-05-04 ENCOUNTER — NON-APPOINTMENT (OUTPATIENT)
Age: 88
End: 2023-05-04

## 2023-05-04 ENCOUNTER — APPOINTMENT (OUTPATIENT)
Dept: OPHTHALMOLOGY | Facility: CLINIC | Age: 88
End: 2023-05-04
Payer: MEDICARE

## 2023-05-04 PROCEDURE — 92014 COMPRE OPH EXAM EST PT 1/>: CPT

## 2023-05-04 PROCEDURE — 92134 CPTRZ OPH DX IMG PST SGM RTA: CPT

## 2023-05-24 NOTE — DIETITIAN INITIAL EVALUATION ADULT - PERTINENT LABORATORY DATA
PO med refill request 05-27    131<L>  |  98  |  13.9  ----------------------------<  108<H>  4.3   |  23.0  |  0.53    Ca    8.6      27 May 2022 07:07  Phos  2.6     05-27  Mg     1.9     05-27    TPro  6.2<L>  /  Alb  3.4  /  TBili  0.6  /  DBili  x   /  AST  202<H>  /  ALT  128<H>  /  AlkPhos  159<H>  05-26  A1C with Estimated Average Glucose Result: 5.3 % (05-26-22 @ 02:39)

## 2023-07-18 ENCOUNTER — RESULT REVIEW (OUTPATIENT)
Age: 88
End: 2023-07-18

## 2023-07-18 ENCOUNTER — APPOINTMENT (OUTPATIENT)
Dept: ULTRASOUND IMAGING | Facility: CLINIC | Age: 88
End: 2023-07-18
Payer: MEDICARE

## 2023-07-18 ENCOUNTER — APPOINTMENT (OUTPATIENT)
Dept: MRI IMAGING | Facility: CLINIC | Age: 88
End: 2023-07-18
Payer: MEDICARE

## 2023-07-18 ENCOUNTER — APPOINTMENT (OUTPATIENT)
Dept: RADIOLOGY | Facility: CLINIC | Age: 88
End: 2023-07-18
Payer: MEDICARE

## 2023-07-18 PROCEDURE — 77080 DXA BONE DENSITY AXIAL: CPT

## 2023-07-18 PROCEDURE — 72141 MRI NECK SPINE W/O DYE: CPT | Mod: MH

## 2023-07-18 PROCEDURE — 70551 MRI BRAIN STEM W/O DYE: CPT | Mod: MH

## 2023-07-18 PROCEDURE — 76536 US EXAM OF HEAD AND NECK: CPT

## 2023-08-03 ENCOUNTER — APPOINTMENT (OUTPATIENT)
Dept: ENDOCRINOLOGY | Facility: CLINIC | Age: 88
End: 2023-08-03
Payer: MEDICARE

## 2023-08-03 VITALS
SYSTOLIC BLOOD PRESSURE: 124 MMHG | WEIGHT: 105 LBS | OXYGEN SATURATION: 98 % | HEIGHT: 58 IN | BODY MASS INDEX: 22.04 KG/M2 | TEMPERATURE: 97.8 F | HEART RATE: 86 BPM | RESPIRATION RATE: 14 BRPM | DIASTOLIC BLOOD PRESSURE: 54 MMHG

## 2023-08-03 DIAGNOSIS — M85.80 OTHER SPECIFIED DISORDERS OF BONE DENSITY AND STRUCTURE, UNSPECIFIED SITE: ICD-10-CM

## 2023-08-03 DIAGNOSIS — E11.9 TYPE 2 DIABETES MELLITUS W/OUT COMPLICATIONS: ICD-10-CM

## 2023-08-03 DIAGNOSIS — E04.2 NONTOXIC MULTINODULAR GOITER: ICD-10-CM

## 2023-08-03 DIAGNOSIS — E78.5 HYPERLIPIDEMIA, UNSPECIFIED: ICD-10-CM

## 2023-08-03 PROCEDURE — 99213 OFFICE O/P EST LOW 20 MIN: CPT

## 2023-08-03 NOTE — ASSESSMENT
[Diabetes Foot Care] : diabetes foot care [Long Term Vascular Complications] : long term vascular complications of diabetes [Carbohydrate Consistent Diet] : carbohydrate consistent diet [Importance of Diet and Exercise] : importance of diet and exercise to improve glycemic control, achieve weight loss and improve cardiovascular health [Exercise/Effect on Glucose] : exercise/effect on glucose [Hypoglycemia Management] : hypoglycemia management [Self Monitoring of Blood Glucose] : self monitoring of blood glucose [Retinopathy Screening] : Patient was referred to ophthalmology for retinopathy screening [FreeTextEntry1] : Elderly white female with a past medical history of mild type 2 diabetes currently well controlled on nateglinide 60 mg tablets twice a day.  He recently had a blood test done and results of which are still pending.  She also underwent a bone density test which showed that the T score at the spine is -1.8 at the femoral neck is -1.3 and the total hip is -1.5.  Sonogram of the thyroid did not show any significant changes in the size of the thyroid nodules.  The right lobe measured 5.9 x 2.1 x 1.5 cm the gland is mildly enlarged and heterogeneous with multiple thyroid nodules all consistent with a multinodular goiter.  The size of the nodule is between 1.1 and 1.2 cm in size.  The left lobe measures 4.8 x 1.8 x 1.5 cm it is also enlarged and heterogeneous without any dominant or suspicious lesions. Recommendation 1.  I have advised the patient to continue her current dose of nateglinide 60 mg twice a day before meals and also the levothyroxine 50 mcg tablets every day. 2.  Regarding her multinodular goiter with mild dominant nodules patient is declining any intervention especially fine-needle aspiration biopsy. 3.  The importance of diet exercise was also discussed with the patient. 4.  If the patient's condition remained stable she will follow-up in approximately 4 to 5 months.  The plan was discussed in detail with the patient thank you

## 2023-08-03 NOTE — HISTORY OF PRESENT ILLNESS
[FreeTextEntry1] : 89-year-old white female with a past medical history of type 2 diabetes multinodular goiter and osteopenia presents for routine follow-up.  Patient is currently on nateglinide 60 mg tablets twice a day and levothyroxine 50 mcg tablet daily.  She also takes atorvastatin 10 mg daily and vitamin D3 and a baby aspirin.  Patient currently feels fine she denies any significant symptoms.  Her sugar levels after the meals range between 130 to 140 mg per DL and in the morning are between 90 to 120 mg per DL.  She denies having symptoms of hypoglycemia.  Her appetite is good her weight has remained stable.  Patient still complains of dizziness especially when she is walking.  She was evaluated by the neurologist and is scheduled to start physical therapy.  She denies chest pain shortness of breath nausea vomiting abdominal pain numbness of extremities or any visual changes.

## 2023-08-03 NOTE — PHYSICAL EXAM
[Alert] : alert [Normal Sclera/Conjunctiva] : normal sclera/conjunctiva [PERRL] : pupils equal, round and reactive to light [Normal Outer Ear/Nose] : the ears and nose were normal in appearance [Normal Hearing] : hearing was normal [No Neck Mass] : no neck mass was observed [Thyroid Not Enlarged] : the thyroid was not enlarged [No Respiratory Distress] : no respiratory distress [Normal S1, S2] : normal S1 and S2 [Normal Rate] : heart rate was normal [Regular Rhythm] : with a regular rhythm [Carotids Normal] : carotid pulses were normal with no bruits [No Edema] : no peripheral edema [Pedal Pulses Normal] : the pedal pulses are present [Not Tender] : non-tender [Soft] : abdomen soft [No HSM] : no hepato-splenomegaly [Normal Supraclavicular Nodes] : no supraclavicular lymphadenopathy [No CVA Tenderness] : no ~M costovertebral angle tenderness [No Spinal Tenderness] : no spinal tenderness [No Stigmata of Cushings Syndrome] : no stigmata of Cushings Syndrome [No Clubbing, Cyanosis] : no clubbing  or cyanosis of the fingernails [No Joint Swelling] : no joint swelling seen [No Rash] : no rash [No Skin Lesions] : no skin lesions [No Motor Deficits] : the motor exam was normal [Normal Reflexes] : deep tendon reflexes were 2+ and symmetric [No Tremors] : no tremors [Oriented x3] : oriented to person, place, and time [Normal Insight/Judgement] : insight and judgment were intact [de-identified] : Elderly female quite frail ambulating with the help of a cane [FreeTextEntry1] : Deferred  [de-identified] :  deferred [de-identified] : Ambulating with the help of a cane

## 2023-08-10 ENCOUNTER — RX RENEWAL (OUTPATIENT)
Age: 88
End: 2023-08-10

## 2023-11-02 ENCOUNTER — NON-APPOINTMENT (OUTPATIENT)
Age: 88
End: 2023-11-02

## 2023-11-02 ENCOUNTER — APPOINTMENT (OUTPATIENT)
Dept: OPHTHALMOLOGY | Facility: CLINIC | Age: 88
End: 2023-11-02
Payer: MEDICARE

## 2023-11-02 PROCEDURE — 92014 COMPRE OPH EXAM EST PT 1/>: CPT

## 2023-11-02 PROCEDURE — 92250 FUNDUS PHOTOGRAPHY W/I&R: CPT

## 2023-11-17 LAB — HBA1C MFR BLD HPLC: 5.2

## 2023-12-12 RX ORDER — NATEGLINIDE 60 MG/1
60 TABLET, COATED ORAL
Qty: 180 | Refills: 3 | Status: ACTIVE | COMMUNITY
Start: 1900-01-01 | End: 1900-01-01

## 2024-01-16 RX ORDER — LANCETS 33 GAUGE
EACH MISCELLANEOUS
Qty: 1 | Refills: 3 | Status: ACTIVE | COMMUNITY
Start: 2023-10-03 | End: 1900-01-01

## 2024-02-04 ENCOUNTER — RX RENEWAL (OUTPATIENT)
Age: 89
End: 2024-02-04

## 2024-02-19 ENCOUNTER — APPOINTMENT (OUTPATIENT)
Dept: ENDOCRINOLOGY | Facility: CLINIC | Age: 89
End: 2024-02-19

## 2024-02-19 ENCOUNTER — TRANSCRIPTION ENCOUNTER (OUTPATIENT)
Age: 89
End: 2024-02-19

## 2024-03-01 ENCOUNTER — TRANSCRIPTION ENCOUNTER (OUTPATIENT)
Age: 89
End: 2024-03-01

## 2024-05-13 RX ORDER — ATORVASTATIN CALCIUM 10 MG/1
10 TABLET, FILM COATED ORAL
Qty: 90 | Refills: 0 | Status: ACTIVE | COMMUNITY
Start: 2024-02-04 | End: 1900-01-01

## 2024-05-14 ENCOUNTER — APPOINTMENT (OUTPATIENT)
Dept: ENDOCRINOLOGY | Facility: CLINIC | Age: 89
End: 2024-05-14

## 2024-05-27 RX ORDER — BLOOD SUGAR DIAGNOSTIC
STRIP MISCELLANEOUS
Qty: 3 | Refills: 0 | Status: ACTIVE | COMMUNITY
Start: 2022-10-07 | End: 1900-01-01

## 2024-08-15 ENCOUNTER — APPOINTMENT (OUTPATIENT)
Dept: ENDOCRINOLOGY | Facility: CLINIC | Age: 89
End: 2024-08-15

## 2024-08-21 ENCOUNTER — APPOINTMENT (OUTPATIENT)
Dept: NEUROSURGERY | Facility: CLINIC | Age: 89
End: 2024-08-21
Payer: MEDICARE

## 2024-08-21 DIAGNOSIS — S32.000S WEDGE COMPRESSION FRACTURE OF UNSPECIFIED LUMBAR VERTEBRA, SEQUELA: ICD-10-CM

## 2024-08-21 PROCEDURE — 99214 OFFICE O/P EST MOD 30 MIN: CPT

## 2024-08-21 PROCEDURE — 99204 OFFICE O/P NEW MOD 45 MIN: CPT

## 2024-08-21 RX ORDER — LEVOTHYROXINE SODIUM 0.17 MG/1
TABLET ORAL
Refills: 0 | Status: ACTIVE | COMMUNITY

## 2024-08-21 RX ORDER — TIZANIDINE HYDROCHLORIDE 2 MG/1
2 CAPSULE ORAL
Refills: 0 | Status: ACTIVE | COMMUNITY

## 2024-08-21 RX ORDER — ATORVASTATIN CALCIUM 10 MG/1
10 TABLET, FILM COATED ORAL
Refills: 0 | Status: ACTIVE | COMMUNITY

## 2024-08-21 RX ORDER — LIDOCAINE 50 MG/G
PATCH CUTANEOUS
Refills: 0 | Status: ACTIVE | COMMUNITY

## 2024-08-21 RX ORDER — NATEGLINIDE 60 MG/1
60 TABLET, COATED ORAL
Refills: 0 | Status: ACTIVE | COMMUNITY

## 2024-08-21 NOTE — HISTORY OF PRESENT ILLNESS
[de-identified] : 89 year old female sp fall at [facility] home and went to [facility] was diagnosed with l1 compression fractrue 7/22/2024 denies back pain no new nt, weakness, balance issues, or bladder issues  see with aid today

## 2024-08-21 NOTE — PLAN
[FreeTextEntry1] : 89 year old female with l1 fracture offered LSO, patient denied  no surgical intervention needed can follow up as needed

## 2024-10-09 ENCOUNTER — APPOINTMENT (OUTPATIENT)
Dept: NEUROLOGY | Facility: CLINIC | Age: 89
End: 2024-10-09

## 2024-10-10 ENCOUNTER — RESULT REVIEW (OUTPATIENT)
Age: 89
End: 2024-10-10

## 2024-10-16 ENCOUNTER — OUTPATIENT (OUTPATIENT)
Dept: OUTPATIENT SERVICES | Facility: HOSPITAL | Age: 88
LOS: 1 days | End: 2024-10-16

## 2024-10-16 DIAGNOSIS — C25.9 MALIGNANT NEOPLASM OF PANCREAS, UNSPECIFIED: ICD-10-CM

## 2024-10-16 DIAGNOSIS — Z90.49 ACQUIRED ABSENCE OF OTHER SPECIFIED PARTS OF DIGESTIVE TRACT: Chronic | ICD-10-CM

## 2024-10-16 DIAGNOSIS — K86.8 OTHER SPECIFIED DISEASES OF PANCREAS: ICD-10-CM

## 2024-10-21 ENCOUNTER — APPOINTMENT (OUTPATIENT)
Dept: HEMATOLOGY ONCOLOGY | Facility: CLINIC | Age: 89
End: 2024-10-21
Payer: MEDICARE

## 2024-10-21 ENCOUNTER — NON-APPOINTMENT (OUTPATIENT)
Age: 89
End: 2024-10-21

## 2024-10-21 VITALS
TEMPERATURE: 97.8 F | WEIGHT: 95 LBS | SYSTOLIC BLOOD PRESSURE: 111 MMHG | BODY MASS INDEX: 19.86 KG/M2 | HEART RATE: 74 BPM | OXYGEN SATURATION: 97 % | DIASTOLIC BLOOD PRESSURE: 71 MMHG

## 2024-10-21 DIAGNOSIS — K86.89 OTHER SPECIFIED DISEASES OF PANCREAS: ICD-10-CM

## 2024-10-21 PROCEDURE — 99205 OFFICE O/P NEW HI 60 MIN: CPT

## 2024-10-24 ENCOUNTER — APPOINTMENT (OUTPATIENT)
Dept: OPHTHALMOLOGY | Facility: CLINIC | Age: 89
End: 2024-10-24

## 2025-01-04 ENCOUNTER — RX RENEWAL (OUTPATIENT)
Age: 89
End: 2025-01-04

## 2025-01-14 ENCOUNTER — APPOINTMENT (OUTPATIENT)
Dept: NEUROLOGY | Facility: CLINIC | Age: 89
End: 2025-01-14
Payer: MEDICARE

## 2025-01-14 VITALS
BODY MASS INDEX: 19.94 KG/M2 | OXYGEN SATURATION: 99 % | HEART RATE: 86 BPM | DIASTOLIC BLOOD PRESSURE: 58 MMHG | HEIGHT: 58 IN | RESPIRATION RATE: 14 BRPM | WEIGHT: 95 LBS | SYSTOLIC BLOOD PRESSURE: 100 MMHG

## 2025-01-14 DIAGNOSIS — R42 DIZZINESS AND GIDDINESS: ICD-10-CM

## 2025-01-14 DIAGNOSIS — F05 DELIRIUM DUE TO KNOWN PHYSIOLOGICAL CONDITION: ICD-10-CM

## 2025-01-14 DIAGNOSIS — S06.5XAA TRAUMATIC SUBDURAL HEMORRHAGE WITH LOSS OF CONSCIOUSNESS STATUS UNKNOWN, INITIAL ENCOUNTER: ICD-10-CM

## 2025-01-14 DIAGNOSIS — R56.9 UNSPECIFIED CONVULSIONS: ICD-10-CM

## 2025-01-14 PROCEDURE — 99214 OFFICE O/P EST MOD 30 MIN: CPT

## 2025-01-14 PROCEDURE — G2211 COMPLEX E/M VISIT ADD ON: CPT

## 2025-01-14 RX ORDER — ASPIRIN 325 MG/1
325 TABLET, FILM COATED ORAL
Refills: 0 | Status: ACTIVE | COMMUNITY

## 2025-01-14 RX ORDER — ALBUTEROL SULFATE 2.5 MG/3ML
(2.5 MG/3ML) SOLUTION RESPIRATORY (INHALATION)
Refills: 0 | Status: ACTIVE | COMMUNITY

## 2025-01-14 RX ORDER — IPRATROPIUM BROMIDE AND ALBUTEROL SULFATE 2.5; .5 MG/3ML; MG/3ML
0.5-2.5 (3) SOLUTION RESPIRATORY (INHALATION)
Refills: 0 | Status: ACTIVE | COMMUNITY

## 2025-01-14 RX ORDER — IBUPROFEN 600 MG/1
600 TABLET, FILM COATED ORAL
Refills: 0 | Status: ACTIVE | COMMUNITY

## 2025-01-14 RX ORDER — BISACODYL 10 MG/1
10 SUPPOSITORY RECTAL
Refills: 0 | Status: ACTIVE | COMMUNITY

## 2025-01-14 RX ORDER — FAMOTIDINE 20 MG/1
20 TABLET, FILM COATED ORAL
Refills: 0 | Status: ACTIVE | COMMUNITY

## 2025-01-14 RX ORDER — DOCUSATE SODIUM 100 MG/1
100 CAPSULE, LIQUID FILLED ORAL
Refills: 0 | Status: ACTIVE | COMMUNITY

## 2025-01-14 RX ORDER — MIDODRINE HYDROCHLORIDE 5 MG/1
5 TABLET ORAL 3 TIMES DAILY
Refills: 0 | Status: ACTIVE | COMMUNITY

## 2025-01-14 RX ORDER — ACETAMINOPHEN 325 MG/1
325 TABLET ORAL
Refills: 0 | Status: ACTIVE | COMMUNITY

## 2025-01-14 RX ORDER — INSULIN LISPRO 100 U/ML
100 INJECTION, SOLUTION INTRAVENOUS; SUBCUTANEOUS
Refills: 0 | Status: ACTIVE | COMMUNITY

## 2025-01-16 ENCOUNTER — TRANSCRIPTION ENCOUNTER (OUTPATIENT)
Age: 89
End: 2025-01-16

## 2025-01-21 ENCOUNTER — APPOINTMENT (OUTPATIENT)
Dept: CT IMAGING | Facility: CLINIC | Age: 89
End: 2025-01-21
Payer: MEDICARE

## 2025-01-21 PROCEDURE — 70450 CT HEAD/BRAIN W/O DYE: CPT

## 2025-02-05 ENCOUNTER — APPOINTMENT (OUTPATIENT)
Dept: NEUROLOGY | Facility: CLINIC | Age: 89
End: 2025-02-05
Payer: MEDICARE

## 2025-02-05 PROCEDURE — 95816 EEG AWAKE AND DROWSY: CPT

## 2025-02-05 PROCEDURE — 93040 RHYTHM ECG WITH REPORT: CPT

## 2025-04-07 ENCOUNTER — APPOINTMENT (OUTPATIENT)
Dept: NEUROLOGY | Facility: CLINIC | Age: 89
End: 2025-04-07